# Patient Record
Sex: FEMALE | Race: BLACK OR AFRICAN AMERICAN | NOT HISPANIC OR LATINO | URBAN - METROPOLITAN AREA
[De-identification: names, ages, dates, MRNs, and addresses within clinical notes are randomized per-mention and may not be internally consistent; named-entity substitution may affect disease eponyms.]

---

## 2017-02-19 ENCOUNTER — INPATIENT (INPATIENT)
Facility: HOSPITAL | Age: 77
LOS: 2 days | Discharge: HOME | End: 2017-02-22
Admitting: INTERNAL MEDICINE
Payer: MEDICARE

## 2017-02-19 VITALS
RESPIRATION RATE: 16 BRPM | TEMPERATURE: 99 F | HEART RATE: 62 BPM | SYSTOLIC BLOOD PRESSURE: 133 MMHG | DIASTOLIC BLOOD PRESSURE: 106 MMHG | OXYGEN SATURATION: 98 %

## 2017-02-19 DIAGNOSIS — I15.9 SECONDARY HYPERTENSION, UNSPECIFIED: ICD-10-CM

## 2017-02-19 DIAGNOSIS — I16.9 HYPERTENSIVE CRISIS, UNSPECIFIED: ICD-10-CM

## 2017-02-19 DIAGNOSIS — J18.9 PNEUMONIA, UNSPECIFIED ORGANISM: ICD-10-CM

## 2017-02-19 DIAGNOSIS — Z98.89 OTHER SPECIFIED POSTPROCEDURAL STATES: Chronic | ICD-10-CM

## 2017-02-19 LAB
ALBUMIN SERPL ELPH-MCNC: 3.5 G/DL — SIGNIFICANT CHANGE UP (ref 3.3–5)
ALP SERPL-CCNC: 99 U/L — SIGNIFICANT CHANGE UP (ref 40–120)
ALT FLD-CCNC: 17 U/L — SIGNIFICANT CHANGE UP (ref 12–78)
ANION GAP SERPL CALC-SCNC: 5 MMOL/L — SIGNIFICANT CHANGE UP (ref 5–17)
APPEARANCE UR: CLEAR — SIGNIFICANT CHANGE UP
APTT BLD: 29.8 SEC — SIGNIFICANT CHANGE UP (ref 27.5–37.4)
AST SERPL-CCNC: 28 U/L — SIGNIFICANT CHANGE UP (ref 15–37)
BACTERIA # UR AUTO: (no result)
BASOPHILS # BLD AUTO: 0.1 K/UL — SIGNIFICANT CHANGE UP (ref 0–0.2)
BASOPHILS NFR BLD AUTO: 1.2 % — SIGNIFICANT CHANGE UP (ref 0–2)
BILIRUB SERPL-MCNC: 0.6 MG/DL — SIGNIFICANT CHANGE UP (ref 0.2–1.2)
BILIRUB UR-MCNC: NEGATIVE — SIGNIFICANT CHANGE UP
BUN SERPL-MCNC: 12 MG/DL — SIGNIFICANT CHANGE UP (ref 7–23)
CALCIUM SERPL-MCNC: 8.7 MG/DL — SIGNIFICANT CHANGE UP (ref 8.5–10.1)
CHLORIDE SERPL-SCNC: 108 MMOL/L — SIGNIFICANT CHANGE UP (ref 96–108)
CO2 SERPL-SCNC: 29 MMOL/L — SIGNIFICANT CHANGE UP (ref 22–31)
COLOR SPEC: YELLOW — SIGNIFICANT CHANGE UP
CREAT SERPL-MCNC: 1 MG/DL — SIGNIFICANT CHANGE UP (ref 0.5–1.3)
D DIMER BLD IA.RAPID-MCNC: 458 NG/ML DDU — HIGH
DIFF PNL FLD: (no result)
EOSINOPHIL # BLD AUTO: 0.1 K/UL — SIGNIFICANT CHANGE UP (ref 0–0.5)
EOSINOPHIL NFR BLD AUTO: 1.1 % — SIGNIFICANT CHANGE UP (ref 0–6)
EPI CELLS # UR: SIGNIFICANT CHANGE UP
GLUCOSE SERPL-MCNC: 94 MG/DL — SIGNIFICANT CHANGE UP (ref 70–99)
GLUCOSE UR QL: NEGATIVE MG/DL — SIGNIFICANT CHANGE UP
HCT VFR BLD CALC: 39.4 % — SIGNIFICANT CHANGE UP (ref 34.5–45)
HGB BLD-MCNC: 13.4 G/DL — SIGNIFICANT CHANGE UP (ref 11.5–15.5)
INR BLD: 1.02 RATIO — SIGNIFICANT CHANGE UP (ref 0.88–1.16)
KETONES UR-MCNC: NEGATIVE — SIGNIFICANT CHANGE UP
LACTATE SERPL-SCNC: 0.9 MMOL/L — SIGNIFICANT CHANGE UP (ref 0.7–2)
LEUKOCYTE ESTERASE UR-ACNC: NEGATIVE — SIGNIFICANT CHANGE UP
LYMPHOCYTES # BLD AUTO: 2 K/UL — SIGNIFICANT CHANGE UP (ref 1–3.3)
LYMPHOCYTES # BLD AUTO: 28.4 % — SIGNIFICANT CHANGE UP (ref 13–44)
MCHC RBC-ENTMCNC: 31.6 PG — SIGNIFICANT CHANGE UP (ref 27–34)
MCHC RBC-ENTMCNC: 34.1 GM/DL — SIGNIFICANT CHANGE UP (ref 32–36)
MCV RBC AUTO: 92.7 FL — SIGNIFICANT CHANGE UP (ref 80–100)
MONOCYTES # BLD AUTO: 0.5 K/UL — SIGNIFICANT CHANGE UP (ref 0–0.9)
MONOCYTES NFR BLD AUTO: 6.7 % — SIGNIFICANT CHANGE UP (ref 2–14)
NEUTROPHILS # BLD AUTO: 4.4 K/UL — SIGNIFICANT CHANGE UP (ref 1.8–7.4)
NEUTROPHILS NFR BLD AUTO: 62.5 % — SIGNIFICANT CHANGE UP (ref 43–77)
NITRITE UR-MCNC: NEGATIVE — SIGNIFICANT CHANGE UP
NT-PROBNP SERPL-SCNC: 338 PG/ML — SIGNIFICANT CHANGE UP (ref 0–450)
NT-PROBNP SERPL-SCNC: 360 PG/ML — SIGNIFICANT CHANGE UP (ref 0–450)
PH UR: 7 — SIGNIFICANT CHANGE UP (ref 4.8–8)
PLATELET # BLD AUTO: 207 K/UL — SIGNIFICANT CHANGE UP (ref 150–400)
POTASSIUM SERPL-MCNC: 5.4 MMOL/L — HIGH (ref 3.5–5.3)
POTASSIUM SERPL-SCNC: 5.4 MMOL/L — HIGH (ref 3.5–5.3)
PROT SERPL-MCNC: 7.7 GM/DL — SIGNIFICANT CHANGE UP (ref 6–8.3)
PROT UR-MCNC: 30 MG/DL
PROTHROM AB SERPL-ACNC: 11.2 SEC — SIGNIFICANT CHANGE UP (ref 10–13.1)
RAPID RVP RESULT: SIGNIFICANT CHANGE UP
RBC # BLD: 4.25 M/UL — SIGNIFICANT CHANGE UP (ref 3.8–5.2)
RBC # FLD: 12.2 % — SIGNIFICANT CHANGE UP (ref 10.3–14.5)
RBC CASTS # UR COMP ASSIST: SIGNIFICANT CHANGE UP /HPF (ref 0–4)
SODIUM SERPL-SCNC: 142 MMOL/L — SIGNIFICANT CHANGE UP (ref 135–145)
SP GR SPEC: 1 — LOW (ref 1.01–1.02)
TROPONIN I SERPL-MCNC: 0.01 NG/ML — SIGNIFICANT CHANGE UP (ref 0.01–0.04)
TROPONIN I SERPL-MCNC: <0.015 NG/ML — SIGNIFICANT CHANGE UP (ref 0.01–0.04)
TROPONIN I SERPL-MCNC: <0.015 NG/ML — SIGNIFICANT CHANGE UP (ref 0.01–0.04)
UROBILINOGEN FLD QL: NEGATIVE MG/DL — SIGNIFICANT CHANGE UP
WBC # BLD: 7.1 K/UL — SIGNIFICANT CHANGE UP (ref 3.8–10.5)
WBC # FLD AUTO: 7.1 K/UL — SIGNIFICANT CHANGE UP (ref 3.8–10.5)
WBC UR QL: SIGNIFICANT CHANGE UP

## 2017-02-19 PROCEDURE — 71010: CPT | Mod: 26

## 2017-02-19 PROCEDURE — 93010 ELECTROCARDIOGRAM REPORT: CPT

## 2017-02-19 PROCEDURE — 99285 EMERGENCY DEPT VISIT HI MDM: CPT

## 2017-02-19 PROCEDURE — 71275 CT ANGIOGRAPHY CHEST: CPT | Mod: 26

## 2017-02-19 RX ORDER — FUROSEMIDE 40 MG
20 TABLET ORAL ONCE
Qty: 0 | Refills: 0 | Status: COMPLETED | OUTPATIENT
Start: 2017-02-19 | End: 2017-02-19

## 2017-02-19 RX ORDER — ACETAMINOPHEN 500 MG
650 TABLET ORAL EVERY 6 HOURS
Qty: 0 | Refills: 0 | Status: DISCONTINUED | OUTPATIENT
Start: 2017-02-19 | End: 2017-02-22

## 2017-02-19 RX ORDER — ASPIRIN/CALCIUM CARB/MAGNESIUM 324 MG
325 TABLET ORAL ONCE
Qty: 0 | Refills: 0 | Status: COMPLETED | OUTPATIENT
Start: 2017-02-19 | End: 2017-02-19

## 2017-02-19 RX ORDER — NITROGLYCERIN 6.5 MG
1 CAPSULE, EXTENDED RELEASE ORAL ONCE
Qty: 0 | Refills: 0 | Status: COMPLETED | OUTPATIENT
Start: 2017-02-19 | End: 2017-02-19

## 2017-02-19 RX ORDER — ONDANSETRON 8 MG/1
4 TABLET, FILM COATED ORAL EVERY 6 HOURS
Qty: 0 | Refills: 0 | Status: DISCONTINUED | OUTPATIENT
Start: 2017-02-19 | End: 2017-02-22

## 2017-02-19 RX ORDER — SODIUM CHLORIDE 9 MG/ML
3 INJECTION INTRAMUSCULAR; INTRAVENOUS; SUBCUTANEOUS ONCE
Qty: 0 | Refills: 0 | Status: COMPLETED | OUTPATIENT
Start: 2017-02-19 | End: 2017-02-19

## 2017-02-19 RX ORDER — ENOXAPARIN SODIUM 100 MG/ML
40 INJECTION SUBCUTANEOUS EVERY 24 HOURS
Qty: 0 | Refills: 0 | Status: DISCONTINUED | OUTPATIENT
Start: 2017-02-19 | End: 2017-02-22

## 2017-02-19 RX ADMIN — Medication 325 MILLIGRAM(S): at 16:34

## 2017-02-19 RX ADMIN — Medication 20 MILLIGRAM(S): at 16:34

## 2017-02-19 RX ADMIN — SODIUM CHLORIDE 3 MILLILITER(S): 9 INJECTION INTRAMUSCULAR; INTRAVENOUS; SUBCUTANEOUS at 16:35

## 2017-02-19 RX ADMIN — Medication 1.25 MILLIGRAM(S): at 23:52

## 2017-02-19 RX ADMIN — Medication 1.25 MILLIGRAM(S): at 21:40

## 2017-02-19 RX ADMIN — Medication 0.1 MILLIGRAM(S): at 21:40

## 2017-02-19 RX ADMIN — Medication 1.25 MILLIGRAM(S): at 19:15

## 2017-02-19 RX ADMIN — Medication 1 INCH(S): at 16:35

## 2017-02-19 RX ADMIN — ENOXAPARIN SODIUM 40 MILLIGRAM(S): 100 INJECTION SUBCUTANEOUS at 23:52

## 2017-02-19 NOTE — H&P ADULT - PROBLEM SELECTOR PLAN 1
Admit to medical surgery unit  No evidence for sepsis  Blood cultures x 2 already collected in ED  Send urine legionella antigen and sputum GS/culture   Continue with levaquin  Follow clinically Admit to medical surgery unit  No evidence for sepsis  RVP negative  Blood cultures x 2 already collected in ED  Send urine legionella antigen and sputum GS/culture   Continue with levaquin  Follow clinically

## 2017-02-19 NOTE — ED PROVIDER NOTE - MEDICAL DECISION MAKING DETAILS
sob new, with cough and htn- r/o pe, possible infectious vs chf vs cad. labs, cxr, meds for htn . observe

## 2017-02-19 NOTE — ED PROVIDER NOTE - OBJECTIVE STATEMENT
77 y/o F with hx of HTN presents to ED for evaluation of shortness of breath, coughing and wheezing which began earlier today. Reports mild chest heaviness. Denies fevers or chills. No tobacco or alcohol use. No past surgical history. PMD- Co.

## 2017-02-19 NOTE — H&P ADULT - HISTORY OF PRESENT ILLNESS
76 year old female with a PMHx notable for HTN, who presents to ED for evaluation of shortness , cough, and wheezing.  Her symptoms included cough with wheezing which began earlier today.  Reports mild chest heavinees.  Otherwise denies or fevers or chills.  No alcohol or tobacco use.   No past surgical history.     ED course: CT chest angio with no evidence for PE   Scattered groundglass opacities in the bilateral LL which may reflect infiltrates or infectious or inflammatory etiology.    PT was given levaquin IV.      I 76 year old female with a PMHx notable for HTN, who presents to ED for evaluation of shortness , cough, and wheezing.  Her symptoms included cough with wheezing which began earlier today.  Reports mild chest heavinees.  Otherwise denies or fevers or chills.  No alcohol or tobacco use.   No past surgical history.     ED course: BP = 220/100 clonidine 0.1mg x 2 given followed by enalaprilat 1.25mg  --> BP improved to 144/63  CT chest angio with no evidence for PE   Scattered groundglass opacities in the bilateral LL which may reflect infiltrates or infectious or inflammatory etiology.  Patient was given levaquin IV.

## 2017-02-19 NOTE — H&P ADULT - NSHPPHYSICALEXAM_GEN_ALL_CORE
Physical Exam:   GENERAL APPEARANCE:  NAD, hemodynamically stable  T(C): 37, Max: 37 (02-19 @ 13:04)  HR: 54 (54 - 62)  BP: 222/97 (133/106 - 222/97)  RR: 16 (16 - 16)  SpO2: 98% (98% - 98%)  Wt(kg): --  HEENT:  Head is normocephalic    Skin:  Warm and dry without any rash   NECK:  Supple without lymphadenopathy.   HEART:  Regular rate and rhythm. normal S1 and S2, No M/R/G  LUNGS:  Good ins/exp effort, no W/R/R/C  ABDOMEN:  Soft, nontender, nondistended with good bowel sounds heard  EXTREMITIES:  Without cyanosis, clubbing or edema.   NEUROLOGICAL:  Gross nonfocal

## 2017-02-19 NOTE — H&P ADULT - NSHPLABSRESULTS_GEN_ALL_CORE
LABS:                        13.4   7.1   )-----------( 207      ( 2017 14:26 )             39.4     2017 14:26    142    |  108    |  12     ----------------------------<  94     5.4     |  29     |  1.00     Ca    8.7        2017 14:26    TPro  7.7    /  Alb  3.5    /  TBili  0.6    /  DBili  x      /  AST  28     /  ALT  17     /  AlkPhos  99     2017 14:26    PT/INR - ( 2017 14:26 )   PT: 11.2 sec;   INR: 1.02 ratio         PTT - ( 2017 14:26 )  PTT:29.8 sec  Urinalysis Basic - ( 2017 14:26 )    Color: Yellow / Appearance: Clear / S.005 / pH: x  Gluc: x / Ketone: Negative  / Bili: Negative / Urobili: Negative mg/dL   Blood: x / Protein: 30 mg/dL / Nitrite: Negative   Leuk Esterase: Negative / RBC: 0-2 /HPF / WBC 0-2   Sq Epi: x / Non Sq Epi: Occasional / Bacteria: Occasional        CARDIAC MARKERS ( 2017 17:36 )  <0.015 ng/mL / x     / x     / x     / x      CARDIAC MARKERS ( 2017 14:52 )  0.015 ng/mL / x     / x     / x     / x      CARDIAC MARKERS ( 2017 14:26 )  <0.015 ng/mL / x     / x     / x     / x

## 2017-02-19 NOTE — ED PROVIDER NOTE - NS ED MD SCRIBE ATTENDING SCRIBE SECTIONS
PAST MEDICAL/SURGICAL/SOCIAL HISTORY/PHYSICAL EXAM/DISPOSITION/REVIEW OF SYSTEMS/HISTORY OF PRESENT ILLNESS

## 2017-02-19 NOTE — ED ADULT NURSE REASSESSMENT NOTE - NS ED NURSE REASSESS COMMENT FT1
Received pt at 19:30 - resting in bed, BP elevated - MD Arciniega aware. No complaints at this time - no apparent signs of distress, safety maintained, will continue to monitor.

## 2017-02-19 NOTE — ED PROVIDER NOTE - PROGRESS NOTE DETAILS
meds: clonidine 0.1, 3x a day, simvastatin 10 once a day, losartan 50 once a day, metoprolol bid unknown dose

## 2017-02-19 NOTE — H&P ADULT - ASSESSMENT
76 year old female with a PMHx notable for HTN, who presents to ED for evaluation of shortness , cough, and wheezing.  Her symptoms included cough with wheezing which began earlier today.  Reports mild chest heaviness.  Otherwise denies or fevers or chills.  No alcohol or tobacco use.   No past surgical history.     ED course:  BP = 220/100 clonidine 0.1mg x 2 given followed by enalaprilat 1.25mg  --> BP improved to 144/63  CT chest angio with no evidence for PE   Scattered groundglass opacities in the bilateral LL which may reflect infiltrates or infectious or inflammatory etiology.    Patient was given levaquin IV.

## 2017-02-20 DIAGNOSIS — E78.5 HYPERLIPIDEMIA, UNSPECIFIED: ICD-10-CM

## 2017-02-20 DIAGNOSIS — I10 ESSENTIAL (PRIMARY) HYPERTENSION: ICD-10-CM

## 2017-02-20 LAB
CULTURE RESULTS: SIGNIFICANT CHANGE UP
SPECIMEN SOURCE: SIGNIFICANT CHANGE UP

## 2017-02-20 RX ORDER — CEFTRIAXONE 500 MG/1
1 INJECTION, POWDER, FOR SOLUTION INTRAMUSCULAR; INTRAVENOUS EVERY 24 HOURS
Qty: 0 | Refills: 0 | Status: DISCONTINUED | OUTPATIENT
Start: 2017-02-21 | End: 2017-02-22

## 2017-02-20 RX ORDER — AZITHROMYCIN 500 MG/1
500 TABLET, FILM COATED ORAL DAILY
Qty: 0 | Refills: 0 | Status: COMPLETED | OUTPATIENT
Start: 2017-02-20 | End: 2017-02-22

## 2017-02-20 RX ORDER — CEFTRIAXONE 500 MG/1
1 INJECTION, POWDER, FOR SOLUTION INTRAMUSCULAR; INTRAVENOUS ONCE
Qty: 0 | Refills: 0 | Status: COMPLETED | OUTPATIENT
Start: 2017-02-20 | End: 2017-02-20

## 2017-02-20 RX ORDER — CEFTRIAXONE 500 MG/1
INJECTION, POWDER, FOR SOLUTION INTRAMUSCULAR; INTRAVENOUS
Qty: 0 | Refills: 0 | Status: DISCONTINUED | OUTPATIENT
Start: 2017-02-20 | End: 2017-02-22

## 2017-02-20 RX ORDER — AMLODIPINE BESYLATE 2.5 MG/1
10 TABLET ORAL ONCE
Qty: 0 | Refills: 0 | Status: COMPLETED | OUTPATIENT
Start: 2017-02-20 | End: 2017-02-20

## 2017-02-20 RX ADMIN — AZITHROMYCIN 500 MILLIGRAM(S): 500 TABLET, FILM COATED ORAL at 12:39

## 2017-02-20 RX ADMIN — ENOXAPARIN SODIUM 40 MILLIGRAM(S): 100 INJECTION SUBCUTANEOUS at 23:13

## 2017-02-20 RX ADMIN — Medication 1.25 MILLIGRAM(S): at 05:46

## 2017-02-20 RX ADMIN — Medication 1.25 MILLIGRAM(S): at 23:13

## 2017-02-20 RX ADMIN — CEFTRIAXONE 100 GRAM(S): 500 INJECTION, POWDER, FOR SOLUTION INTRAMUSCULAR; INTRAVENOUS at 12:24

## 2017-02-20 RX ADMIN — Medication 1 INCH(S): at 05:49

## 2017-02-20 RX ADMIN — Medication 1.25 MILLIGRAM(S): at 16:30

## 2017-02-20 RX ADMIN — Medication 650 MILLIGRAM(S): at 05:50

## 2017-02-20 RX ADMIN — Medication 1.25 MILLIGRAM(S): at 11:18

## 2017-02-20 NOTE — PATIENT PROFILE ADULT. - PMH
Arthritis    Cataract  bilateral  DJD (degenerative joint disease)    HTN (hypertension)    Legally blind

## 2017-02-20 NOTE — CONSULT NOTE ADULT - SUBJECTIVE AND OBJECTIVE BOX
Patient is a 76y old  Female who presents with a chief complaint of Shortness of breath, cough, wheezing (2017 02:56)      HPI:  76 year old female with a PMHx notable for HTN, arthritis, admitted  with shortness of breath, cough, and wheezing which began day of admission, no recent travel, no sick contacts, no tobacco use, no nasal congestion or sore throat, here afebrile, no wbc ct, BP was noted to be high around 220/100, CTA was performed revealing no PE but scattered ground glass opacities in b/l lower lobes concerning for PNA, was given IV levaquin for PNA coverage.    Last hospitalization was several years ago.      PMH: as above    PSH: as above    Meds: per reconciliation sheet, noted below    MEDICATIONS  (STANDING):  enalaprilat Injectable 1.25milliGRAM(s) IV Push every 6 hours  enoxaparin Injectable 40milliGRAM(s) SubCutaneous every 24 hours  cefTRIAXone   IVPB  IV Intermittent   azithromycin   Tablet 500milliGRAM(s) Oral daily  cefTRIAXone   IVPB 1Gram(s) IV Intermittent once      Allergies    Aggrenox (Unknown)    Intolerances        Social: no smoking, no alcohol, no illegal drugs; no recent travel, no exposure to TB    Family history:  No pertinent family history in first degree relatives      ROS: the patient denies fever, no chills, no HA, no dizziness, no sore throat, no blurry vision, no CP, no palpitations, no abdominal pain, no diarrhea, no N/V, no dysuria, no leg pain, no claudication, no rash, no joint aches, no rectal pain or bleeding, no night sweats    Vital Signs Last 24 Hrs  T(C): 36.8, Max: 37 (- @ 13:04)  T(F): 98.2, Max: 98.6 ( @ 13:04)  HR: 51 (45 - 62)  BP: 150/56 (133/106 - 222/97)  BP(mean): --  RR: 18 (16 - 21)  SpO2: 100% (96% - 100%)      PE:  Constitutional: laying in bed, NAD  HEENT: NC/AT, EOMI, PERRLA  Neck: supple  Back: no tenderness  Respiratory: decreased breath sounds, no rhonchi  Cardiovascular: S1S2 regular, no murmurs  Abdomen: soft, not tender, not distended, positive BS  Genitourinary: deferred  Rectal: deferred  Musculoskeletal: no muscle tenderness, no joint swelling or tenderness  Extremities: no pedal edema  Neurological: AxOx3, moving all extremities, no focal deficits  Skin: no rashes    Labs:                        13.4   7.1   )-----------( 207      ( 2017 14:26 )             39.4     2017 14:26    142    |  108    |  12     ----------------------------<  94     5.4     |  29     |  1.00     Ca    8.7        2017 14:26    TPro  7.7    /  Alb  3.5    /  TBili  0.6    /  DBili  x      /  AST  28     /  ALT  17     /  AlkPhos  99     2017 14:26     LIVER FUNCTIONS - ( 2017 14:26 )  Alb: 3.5 g/dL / Pro: 7.7 gm/dL / ALK PHOS: 99 U/L / ALT: 17 U/L / AST: 28 U/L / GGT: x           Urinalysis Basic - ( 2017 14:26 )    Color: Yellow / Appearance: Clear / S.005 / pH: x  Gluc: x / Ketone: Negative  / Bili: Negative / Urobili: Negative mg/dL   Blood: x / Protein: 30 mg/dL / Nitrite: Negative   Leuk Esterase: Negative / RBC: 0-2 /HPF / WBC 0-2   Sq Epi: x / Non Sq Epi: Occasional / Bacteria: Occasional            Radiology:  EXAM:  CT ANGIO CHEST PE PROTOCOL IC                            PROCEDURE DATE:  2017        INTERPRETATION:      CT Angiography of the chest for the evaluation of pulmonary emboli            CLINICAL INFORMATION:  Cough Pulmonary emboli, short of breath.    TECHNIQUE: Contiguous axial .5 mm sections were obtained through the   chest using single helical acquisition acquired during the rapid bolus   administration of 95 cc of Omnipaque 350/ 5 cc discarded.   This data set   was reconstructed as 3 mm and 1 mm axial sections through the chest and   as 3 mm sagittal  and coronal sections.  Post processing angiographic   reconstruction of images was performed.   This scan was performed using   automatic exposure control (radiation dose reduction software) to obtain   a diagnostic image quality scan with patient dose as low as reasonably   achievable.        FINDINGS:   No previous examinations are available for review.    No convincing pulmonary emboli are identified.  This evaluationincludes   the main pulmonary artery, its right and left branches, and their   segmental branches. Subsegmental branches are incompletely evaluated but   where seen appear patent.    The thyroid gland appears intact.    The lungs are significant for scattered faint groundglass opacities in   the BILATERAL lower lobes which may reflect infiltrates of infectious or   inflammatory etiology.  The trachea and major bronchi appear patent.    No enlarged axillary, hilar or mediastinal lymph nodes are found.   The   heart size is normal.   The mediastinum, chest wall and thoracic spine   appear otherwise unremarkable.    Limited evaluation of the upper abdomen is unremarkable.      IMPRESSION:        1.   No evidence of pulmonary emboli        2.   scattered faint groundglass opacities in the BILATERAL lower   lobes which may reflect infiltrates of infectious or inflammatory   etiology        Advanced directives addressed: full resuscitation

## 2017-02-20 NOTE — PROGRESS NOTE ADULT - PROBLEM SELECTOR PLAN 1
No evidence for sepsis  RVP negative  Appreciate ID eval  Optimize abx --> to ceftriaxone/azithromycin  Follow-up urine legionella antigen and sputum GS/culture   Follow clinically

## 2017-02-20 NOTE — CONSULT NOTE ADULT - ASSESSMENT
76 year old female with a PMHx notable for HTN, arthritis, admitted 2/19 with shortness of breath, cough, and wheezing which began day of admission, no recent travel, no sick contacts, no tobacco use, no nasal congestion or sore throat, here afebrile, no wbc ct, BP was noted to be high around 220/100, CTA was performed revealing no PE but scattered ground glass opacities in b/l lower lobes concerning for PNA, was given IV levaquin for PNA coverage.    1. CAP/b/l lower lobe pneumonia/malignant hypertension    - given 1 dose of levaquin    - hold further levaquin and start CAP coverage with IV rocephin [id number] plus azithromycin 500mg daily    - if improves in next 24-48 hours, switch to oral ceftin 500mg q12h for total 7 days and aztihromycin 500mg X 3 days    - f/u CBC    - monitor temps    - -tolerating abx well so far; no side effects noted    -reason for abx use and side effects reviewed with patient    - supportive care    - BP improved

## 2017-02-21 LAB
ANION GAP SERPL CALC-SCNC: 10 MMOL/L — SIGNIFICANT CHANGE UP (ref 5–17)
BUN SERPL-MCNC: 11 MG/DL — SIGNIFICANT CHANGE UP (ref 7–23)
CALCIUM SERPL-MCNC: 8.8 MG/DL — SIGNIFICANT CHANGE UP (ref 8.5–10.1)
CHLORIDE SERPL-SCNC: 109 MMOL/L — HIGH (ref 96–108)
CO2 SERPL-SCNC: 26 MMOL/L — SIGNIFICANT CHANGE UP (ref 22–31)
CREAT SERPL-MCNC: 0.76 MG/DL — SIGNIFICANT CHANGE UP (ref 0.5–1.3)
GLUCOSE SERPL-MCNC: 100 MG/DL — HIGH (ref 70–99)
HCT VFR BLD CALC: 38.6 % — SIGNIFICANT CHANGE UP (ref 34.5–45)
HGB BLD-MCNC: 13.1 G/DL — SIGNIFICANT CHANGE UP (ref 11.5–15.5)
LEGIONELLA AG UR QL: NEGATIVE — SIGNIFICANT CHANGE UP
MCHC RBC-ENTMCNC: 31.5 PG — SIGNIFICANT CHANGE UP (ref 27–34)
MCHC RBC-ENTMCNC: 34 GM/DL — SIGNIFICANT CHANGE UP (ref 32–36)
MCV RBC AUTO: 92.7 FL — SIGNIFICANT CHANGE UP (ref 80–100)
PLATELET # BLD AUTO: 186 K/UL — SIGNIFICANT CHANGE UP (ref 150–400)
POTASSIUM SERPL-MCNC: 3.7 MMOL/L — SIGNIFICANT CHANGE UP (ref 3.5–5.3)
POTASSIUM SERPL-SCNC: 3.7 MMOL/L — SIGNIFICANT CHANGE UP (ref 3.5–5.3)
RBC # BLD: 4.16 M/UL — SIGNIFICANT CHANGE UP (ref 3.8–5.2)
RBC # FLD: 12.4 % — SIGNIFICANT CHANGE UP (ref 10.3–14.5)
SODIUM SERPL-SCNC: 145 MMOL/L — SIGNIFICANT CHANGE UP (ref 135–145)
WBC # BLD: 7 K/UL — SIGNIFICANT CHANGE UP (ref 3.8–10.5)
WBC # FLD AUTO: 7 K/UL — SIGNIFICANT CHANGE UP (ref 3.8–10.5)

## 2017-02-21 RX ORDER — METOPROLOL TARTRATE 50 MG
25 TABLET ORAL
Qty: 0 | Refills: 0 | Status: DISCONTINUED | OUTPATIENT
Start: 2017-02-21 | End: 2017-02-21

## 2017-02-21 RX ORDER — PANTOPRAZOLE SODIUM 20 MG/1
40 TABLET, DELAYED RELEASE ORAL
Qty: 0 | Refills: 0 | Status: DISCONTINUED | OUTPATIENT
Start: 2017-02-21 | End: 2017-02-22

## 2017-02-21 RX ORDER — LOSARTAN POTASSIUM 100 MG/1
25 TABLET, FILM COATED ORAL DAILY
Qty: 0 | Refills: 0 | Status: DISCONTINUED | OUTPATIENT
Start: 2017-02-21 | End: 2017-02-21

## 2017-02-21 RX ORDER — LOSARTAN POTASSIUM 100 MG/1
25 TABLET, FILM COATED ORAL DAILY
Qty: 0 | Refills: 0 | Status: DISCONTINUED | OUTPATIENT
Start: 2017-02-22 | End: 2017-02-22

## 2017-02-21 RX ORDER — CEFOXITIN 1 G/1
1 INJECTION, POWDER, FOR SOLUTION INTRAVENOUS
Qty: 10 | Refills: 0 | OUTPATIENT
Start: 2017-02-21 | End: 2017-02-26

## 2017-02-21 RX ORDER — METOPROLOL TARTRATE 50 MG
50 TABLET ORAL
Qty: 0 | Refills: 0 | Status: DISCONTINUED | OUTPATIENT
Start: 2017-02-22 | End: 2017-02-22

## 2017-02-21 RX ORDER — METOPROLOL TARTRATE 50 MG
25 TABLET ORAL ONCE
Qty: 0 | Refills: 0 | Status: COMPLETED | OUTPATIENT
Start: 2017-02-21 | End: 2017-02-21

## 2017-02-21 RX ORDER — ATORVASTATIN CALCIUM 80 MG/1
10 TABLET, FILM COATED ORAL AT BEDTIME
Qty: 0 | Refills: 0 | Status: DISCONTINUED | OUTPATIENT
Start: 2017-02-21 | End: 2017-02-22

## 2017-02-21 RX ADMIN — Medication 1.25 MILLIGRAM(S): at 17:07

## 2017-02-21 RX ADMIN — Medication 1.25 MILLIGRAM(S): at 10:43

## 2017-02-21 RX ADMIN — Medication 0.1 MILLIGRAM(S): at 16:12

## 2017-02-21 RX ADMIN — Medication 30 MILLILITER(S): at 03:23

## 2017-02-21 RX ADMIN — LOSARTAN POTASSIUM 25 MILLIGRAM(S): 100 TABLET, FILM COATED ORAL at 12:28

## 2017-02-21 RX ADMIN — CEFTRIAXONE 100 GRAM(S): 500 INJECTION, POWDER, FOR SOLUTION INTRAMUSCULAR; INTRAVENOUS at 10:43

## 2017-02-21 RX ADMIN — Medication 25 MILLIGRAM(S): at 16:59

## 2017-02-21 RX ADMIN — PANTOPRAZOLE SODIUM 40 MILLIGRAM(S): 20 TABLET, DELAYED RELEASE ORAL at 10:44

## 2017-02-21 RX ADMIN — AMLODIPINE BESYLATE 10 MILLIGRAM(S): 2.5 TABLET ORAL at 00:31

## 2017-02-21 RX ADMIN — Medication 1.25 MILLIGRAM(S): at 05:02

## 2017-02-21 RX ADMIN — ENOXAPARIN SODIUM 40 MILLIGRAM(S): 100 INJECTION SUBCUTANEOUS at 23:22

## 2017-02-21 RX ADMIN — Medication 25 MILLIGRAM(S): at 11:37

## 2017-02-21 RX ADMIN — ATORVASTATIN CALCIUM 10 MILLIGRAM(S): 80 TABLET, FILM COATED ORAL at 22:21

## 2017-02-21 RX ADMIN — Medication 0.1 MILLIGRAM(S): at 22:21

## 2017-02-21 RX ADMIN — AZITHROMYCIN 500 MILLIGRAM(S): 500 TABLET, FILM COATED ORAL at 12:28

## 2017-02-21 NOTE — DISCHARGE NOTE ADULT - CARE PROVIDER_API CALL
Co, August NESS), Internal Medicine; Pulmonary Disease  [address] [place] [address]  Phone: [phone]  Fax: [phone]

## 2017-02-21 NOTE — PROGRESS NOTE ADULT - ASSESSMENT
76 year old female with a PMHx notable for HTN, who presents to ED for evaluation of shortness , cough, and wheezing.  Her symptoms included cough with wheezing which began earlier today.  Reports mild chest heaviness.  Otherwise denies or fevers or chills.  No alcohol or tobacco use.   No past surgical history.     ED course:  BP = 220/100 clonidine 0.1mg x 2 given followed by enalaprilat 1.25mg  --> BP improved to 144/63  CT chest angio with no evidence for PE   Scattered groundglass opacities in the bilateral LL which may reflect infiltrates or infectious or inflammatory etiology.    Patient was given levaquin IV.
76 year old female with a PMHx notable for HTN, arthritis, admitted 2/19 with shortness of breath, cough, and wheezing which began day of admission, no recent travel, no sick contacts, no tobacco use, no nasal congestion or sore throat, here afebrile, no wbc ct, BP was noted to be high around 220/100, CTA was performed revealing no PE but scattered ground glass opacities in b/l lower lobes concerning for PNA, was given IV levaquin for PNA coverage.    1. CAP/b/l lower lobe pneumonia/malignant hypertension    - improving    - given 1 dose of levaquin    - continue with CAP coverage with IV rocephin [id number] plus azithromycin 500mg daily    -switch to oral ceftin 500mg q12h for total 7 days and aztihromycin 500mg X 3 days total    - f/u CBC    - monitor temps    - -tolerating abx well so far; no side effects noted    -reason for abx use and side effects reviewed with patient    - supportive care    - BP improved
76 year old female with a PMHx notable for HTN, who presents to ED for evaluation of shortness , cough, and wheezing.  Her symptoms included cough with wheezing which began earlier today.  Reports mild chest heaviness.  Otherwise denies or fevers or chills.  No alcohol or tobacco use.   No past surgical history.     ED course:  BP = 220/100 clonidine 0.1mg x 2 given followed by enalaprilat 1.25mg  --> BP improved to 144/63  CT chest angio with no evidence for PE   Scattered groundglass opacities in the bilateral LL which may reflect infiltrates or infectious or inflammatory etiology.    Patient was given levaquin IV.

## 2017-02-21 NOTE — DISCHARGE NOTE ADULT - PLAN OF CARE
abx f/u pcp in 1-2 weeks med management as above. please follow up closely with your pcp for ongoing care and very close blood pressure control.

## 2017-02-21 NOTE — DISCHARGE NOTE ADULT - MEDICATION SUMMARY - MEDICATIONS TO CHANGE
I will SWITCH the dose or number of times a day I take the medications listed below when I get home from the hospital:  None I will SWITCH the dose or number of times a day I take the medications listed below when I get home from the hospital:    metoprolol tartrate 25 mg oral tablet  -- 1 tab(s) by mouth 2 times a day

## 2017-02-21 NOTE — PROGRESS NOTE ADULT - PROBLEM SELECTOR PLAN 1
No evidence for sepsis  RVP negative  D/W dR newberry, R/Z day #3-> can switch to ceftin 500 BID for 5 more days upon d/c, presumably today if BP controlled

## 2017-02-21 NOTE — DISCHARGE NOTE ADULT - MEDICATION SUMMARY - MEDICATIONS TO STOP TAKING
I will STOP taking the medications listed below when I get home from the hospital:    BP med, pt unsure of name  --

## 2017-02-21 NOTE — PROGRESS NOTE ADULT - PROBLEM SELECTOR PLAN 2
Need to clarify BP med taken at home  Start norvasc while in the hospital
start lopressor 25bid  losartan 25 qd  recheck BP this afternoon, if uncontrolled add clonindine .1 po TID and monitor for 24 hours

## 2017-02-21 NOTE — PHYSICAL THERAPY INITIAL EVALUATION ADULT - LIVES WITH, PROFILE
resides [facility]; rosalie Horton resides  [address]st floor [address] [place]; dtr Sol lives nearby and stops in frequently/alone

## 2017-02-21 NOTE — CHART NOTE - NSCHARTNOTEFT_GEN_A_CORE
d/c postponed, BP elevated. Will need further w/u to include pheo rule out. Ordered Urine VMA and urine metanephrines.   Plan:  Increase BB  Add HCTZ  Continue clonidine/ARB

## 2017-02-21 NOTE — DISCHARGE NOTE ADULT - PATIENT PORTAL LINK FT
“You can access the [facility] Patient Portal, offered by [facility], by registering with the following website: [email or web address]”

## 2017-02-21 NOTE — DISCHARGE NOTE ADULT - MEDICATION SUMMARY - MEDICATIONS TO TAKE
I will START or STAY ON the medications listed below when I get home from the hospital:    losartan 25 mg oral tablet  -- 1 tab(s) by mouth once a day  -- Indication: For Htn    cloNIDine 0.1 mg oral tablet  -- 1 milligram(s) by mouth 3 times a day  -- Indication: For Htn    simvastatin 10 mg oral tablet  -- 1 tab(s) by mouth once a day (at bedtime)  -- Indication: For dld    metoprolol tartrate 25 mg oral tablet  -- 1 tab(s) by mouth 2 times a day  -- Indication: For Htn    Ceftin 500 mg oral tablet  -- 1 tab(s) by mouth 2 times a day  -- Finish all this medication unless otherwise directed by prescriber.  Medication should be taken with plenty of water.  Take with food or milk.    -- Indication: For Abx I will START or STAY ON the medications listed below when I get home from the hospital:    losartan 25 mg oral tablet  -- 1 tab(s) by mouth once a day  -- Indication: For Htn    cloNIDine 0.1 mg oral tablet  -- 1 milligram(s) by mouth 3 times a day  -- Indication: For Htn    simvastatin 10 mg oral tablet  -- 1 tab(s) by mouth once a day (at bedtime)  -- Indication: For dld    metoprolol tartrate 50 mg oral tablet  -- 1 tab(s) by mouth 2 times a day  -- Indication: For HYPERTENSION    Ceftin 500 mg oral tablet  -- 1 tab(s) by mouth 2 times a day  -- Finish all this medication unless otherwise directed by prescriber.  Medication should be taken with plenty of water.  Take with food or milk.    -- Indication: For Abx

## 2017-02-21 NOTE — DISCHARGE NOTE ADULT - CARE PLAN
Principal Discharge DX:	CAP (community acquired pneumonia)  Goal:	abx  Instructions for follow-up, activity and diet:	f/u pcp in 1-2 weeks  Secondary Diagnosis:	Essential hypertension Principal Discharge DX:	CAP (community acquired pneumonia)  Goal:	abx  Instructions for follow-up, activity and diet:	f/u pcp in 1-2 weeks  Secondary Diagnosis:	Essential hypertension  Goal:	med management as above.  Instructions for follow-up, activity and diet:	please follow up closely with your pcp for ongoing care and very close blood pressure control.

## 2017-02-21 NOTE — DISCHARGE NOTE ADULT - HOSPITAL COURSE
pt admitted for CAP and malignant HTN, s/p 3 days Rocephin and zithro to complate full oral ceftin course for 5 more days then f/u with PCP. Low Na diet. pt admitted for CAP and malignant HTN, s/p 3 days Rocephin and zithro to complate full oral ceftin course for 5 more days then f/u with PCP. Low Na diet.  Pt needs very close follow up for her blood pressure control as outpatient.

## 2017-02-22 VITALS
DIASTOLIC BLOOD PRESSURE: 73 MMHG | SYSTOLIC BLOOD PRESSURE: 144 MMHG | HEART RATE: 60 BPM | RESPIRATION RATE: 17 BRPM | TEMPERATURE: 98 F

## 2017-02-22 RX ORDER — METOPROLOL TARTRATE 50 MG
1 TABLET ORAL
Qty: 0 | Refills: 0 | DISCHARGE
Start: 2017-02-22

## 2017-02-22 RX ORDER — METOPROLOL TARTRATE 50 MG
1 TABLET ORAL
Qty: 0 | Refills: 0 | COMMUNITY

## 2017-02-22 RX ADMIN — Medication 0.1 MILLIGRAM(S): at 14:20

## 2017-02-22 RX ADMIN — PANTOPRAZOLE SODIUM 40 MILLIGRAM(S): 20 TABLET, DELAYED RELEASE ORAL at 08:33

## 2017-02-22 RX ADMIN — Medication 50 MILLIGRAM(S): at 05:04

## 2017-02-22 RX ADMIN — CEFTRIAXONE 100 GRAM(S): 500 INJECTION, POWDER, FOR SOLUTION INTRAMUSCULAR; INTRAVENOUS at 11:33

## 2017-02-22 RX ADMIN — LOSARTAN POTASSIUM 25 MILLIGRAM(S): 100 TABLET, FILM COATED ORAL at 05:04

## 2017-02-22 RX ADMIN — AZITHROMYCIN 500 MILLIGRAM(S): 500 TABLET, FILM COATED ORAL at 11:34

## 2017-02-22 RX ADMIN — Medication 0.1 MILLIGRAM(S): at 05:06

## 2017-02-22 NOTE — PROGRESS NOTE ADULT - SUBJECTIVE AND OBJECTIVE BOX
76 year old female with a PMHx notable for HTN, arthritis, admitted 2/19 with shortness of breath, cough, and wheezing which began day of admission, no recent travel, no sick contacts, no tobacco use, no nasal congestion or sore throat, here afebrile, no wbc ct, BP was noted to be high around 220/100, CTA was performed revealing no PE but scattered ground glass opacities in b/l lower lobes concerning for PNA, was given IV levaquin for PNA coverage.    feels better  has some reflux  no fevers    MEDICATIONS  (STANDING):  enalaprilat Injectable 1.25milliGRAM(s) IV Push every 6 hours  enoxaparin Injectable 40milliGRAM(s) SubCutaneous every 24 hours  cefTRIAXone   IVPB  IV Intermittent   azithromycin   Tablet 500milliGRAM(s) Oral daily  cefTRIAXone   IVPB 1Gram(s) IV Intermittent every 24 hours  metoprolol 25milliGRAM(s) Oral two times a day  losartan 25milliGRAM(s) Oral daily  atorvastatin 10milliGRAM(s) Oral at bedtime  pantoprazole    Tablet 40milliGRAM(s) Oral before breakfast      Vital Signs Last 24 Hrs  T(C): 36.8, Max: 36.8 (02-21 @ 09:58)  T(F): 98.2, Max: 98.2 (02-21 @ 09:58)  HR: 79 (65 - 79)  BP: 157/62 (155/62 - 198/70)  BP(mean): --  RR: 16 (16 - 16)  SpO2: 99% (96% - 100%)        PE:  Constitutional: laying in bed, NAD  HEENT: NC/AT, EOMI, PERRLA  Neck: supple  Back: no tenderness  Respiratory: decreased breath sounds, no rhonchi  Cardiovascular: S1S2 regular, no murmurs  Abdomen: soft, not tender, not distended, positive BS  Genitourinary: deferred  Rectal: deferred  Musculoskeletal: no muscle tenderness, no joint swelling or tenderness  Extremities: no pedal edema  Neurological: AxOx3, moving all extremities, no focal deficits  Skin: no rashes    Labs:                        13.1   7.0   )-----------( 186      ( 21 Feb 2017 07:28 )             38.6     21 Feb 2017 07:28    145    |  109    |  11     ----------------------------<  100    3.7     |  26     |  0.76     Ca    8.8        21 Feb 2017 07:28    TPro  7.7    /  Alb  3.5    /  TBili  0.6    /  DBili  x      /  AST  28     /  ALT  17     /  AlkPhos  99     19 Feb 2017 14:26           Cultures: urine cx, blood cx no growth        Radiology:  EXAM:  CT ANGIO CHEST PE PROTOCOL IC                            PROCEDURE DATE:  02/19/2017        INTERPRETATION:      CT Angiography of the chest for the evaluation of pulmonary emboli            CLINICAL INFORMATION:  Cough Pulmonary emboli, short of breath.    TECHNIQUE: Contiguous axial .5 mm sections were obtained through the   chest using single helical acquisition acquired during the rapid bolus   administration of 95 cc of Omnipaque 350/ 5 cc discarded.   This data set   was reconstructed as 3 mm and 1 mm axial sections through the chest and   as 3 mm sagittal  and coronal sections.  Post processing angiographic   reconstruction of images was performed.   This scan was performed using   automatic exposure control (radiation dose reduction software) to obtain   a diagnostic image quality scan with patient dose as low as reasonably   achievable.        FINDINGS:   No previous examinations are available for review.    No convincing pulmonary emboli are identified.  This evaluationincludes   the main pulmonary artery, its right and left branches, and their   segmental branches. Subsegmental branches are incompletely evaluated but   where seen appear patent.    The thyroid gland appears intact.    The lungs are significant for scattered faint groundglass opacities in   the BILATERAL lower lobes which may reflect infiltrates of infectious or   inflammatory etiology.  The trachea and major bronchi appear patent.    No enlarged axillary, hilar or mediastinal lymph nodes are found.   The   heart size is normal.   The mediastinum, chest wall and thoracic spine   appear otherwise unremarkable.    Limited evaluation of the upper abdomen is unremarkable.      IMPRESSION:        1.   No evidence of pulmonary emboli        2.   scattered faint groundglass opacities in the BILATERAL lower   lobes which may reflect infiltrates of infectious or inflammatory   etiology        Advanced directives addressed: full resuscitation
76 year old female with a PMHx notable for HTN, who presents to ED for evaluation of shortness , cough, and wheezing.  Her symptoms included cough with wheezing which began earlier today.  Reports mild chest heavinees.  Otherwise denies or fevers or chills.  No alcohol or tobacco use.   No past surgical history.     ED course: BP = 220/100 clonidine 0.1mg x 2 given followed by enalaprilat 1.25mg  --> BP improved to 144/63  CT chest angio with no evidence for PE   Scattered groundglass opacities in the bilateral LL which may reflect infiltrates or infectious or inflammatory etiology.  Patient was given levaquin IV.      2/21: afebrile. BP remains uncontrolled. 155/61 after dose of vascotec IV.  Home BP meds have yet to be resumed. Denies cp/sob, dizziness or diplopia.     2/22: Pt had episode of hypertension yesterday with systolic 200.  Asymptomatic however.  Currently BP greatly improved but can have elevations/fluctations.  Spoke to her about this and she will need very close outpatient monitoring of her BP.  Goal is not too dramatically reduce her bp but to slowly bring it down over time.  She may need further work up for secondary causes as outpatient.    Vital Signs Last 24 Hrs  T(C): 36.6, Max: 36.6 (02-21 @ 22:00)  T(F): 97.9, Max: 97.9 (02-21 @ 22:00)  HR: 63 (62 - 76)  BP: 150/60 (142/70 - 200/75)  BP(mean): --  RR: 16 (16 - 17)  SpO2: 100% (96% - 100%)      GENERAL: NAD, well-groomed, well-developed  HEAD:  Atraumatic, Normocephalic  EYES: EOMI, PERRLA, conjunctiva and sclera clear  ENMT: Moist mucous membranes  NECK: Supple, No JVD  NERVOUS SYSTEM:  Alert & Oriented X3, Motor Strength 5/5 B/L upper and lower extremities; DTRs 2+ intact and symmetric  CHEST/LUNG: Clear to auscultation bilaterally; No rales, rhonchi, wheezing, or rubs  HEART: Regular rate and rhythm; No murmurs, rubs, or gallops  ABDOMEN: Soft, Nontender, Nondistended; Bowel sounds present  EXTREMITIES:  2+ Peripheral Pulses, No clubbing, cyanosis, or edema    MEDICATIONS  (STANDING):  enoxaparin Injectable 40milliGRAM(s) SubCutaneous every 24 hours  cefTRIAXone   IVPB  IV Intermittent   cefTRIAXone   IVPB 1Gram(s) IV Intermittent every 24 hours  atorvastatin 10milliGRAM(s) Oral at bedtime  pantoprazole    Tablet 40milliGRAM(s) Oral before breakfast  cloNIDine 0.1milliGRAM(s) Oral three times a day  metoprolol 50milliGRAM(s) Oral two times a day  losartan 25milliGRAM(s) Oral daily    MEDICATIONS  (PRN):  acetaminophen   Tablet 650milliGRAM(s) Oral every 6 hours PRN For Temp greater than 38 C (100.4 F)  ondansetron Injectable 4milliGRAM(s) IV Push every 6 hours PRN Nausea                              13.1   7.0   )-----------( 186      ( 21 Feb 2017 07:28 )             38.6     21 Feb 2017 07:28    145    |  109    |  11     ----------------------------<  100    3.7     |  26     |  0.76     Ca    8.8        21 Feb 2017 07:28      CAPILLARY BLOOD GLUCOSE        Assessment and Plan:   · Assessment		  76 year old female with a PMHx notable for HTN, who presents to ED for evaluation of shortness , cough, and wheezing secondary to pneumonia      Problem/Plan - 1:  ·  Problem: CAP (community acquired pneumonia).  Plan: No evidence for sepsis  RVP negative  D/W dR newberry, R/Z day #3-> can switch to ceftin 500 BID for 5 more days upon d/c.    Problem/Plan - 2:  ·  Problem: Malignant hypertension.    lopressor 50mg BID  losartan 25 qd  clonindine .1 po TID   close outpatiet follow up    Problem/Plan - 3:  ·  Problem: Hyperlipidemia, unspecified hyperlipidemia type.  Plan: Low fat diet  lipitor 10 qhs.       dispo:  Today with outpatient follow up for her blood pressure.    Attending Attestation:   55 minutes spent on total encounter; more than 50% of the visit was spent counseling and/or coordinating care by the attending physician.     Plan discussed with Patient and nursing.
76 year old female with a PMHx notable for HTN, who presents to ED for evaluation of shortness , cough, and wheezing.  Her symptoms included cough with wheezing which began earlier today.  Reports mild chest heavinees.  Otherwise denies or fevers or chills.  No alcohol or tobacco use.   No past surgical history.     ED course: BP = 220/100 clonidine 0.1mg x 2 given followed by enalaprilat 1.25mg  --> BP improved to 144/63  CT chest angio with no evidence for PE   Scattered groundglass opacities in the bilateral LL which may reflect infiltrates or infectious or inflammatory etiology.  Patient was given levaquin IV.      : afebrile. BP remains uncontrolled. 155/61 after dose of vascotec IV.  Home BP meds have yet to be resumed. Denies cp/sob, dizziness or diplopia.           ICU Vital Signs Last 24 Hrs  T(C): 36.7, Max: 36.7 (-20 @ 16:05)  T(F): 98.1, Max: 98.1 (- @ 16:05)  HR: 71 (65 - 71)  BP: 155/62 (155/62 - 198/70)  BP(mean): --  ABP: --  ABP(mean): --  RR: 16 (16 - 16)  SpO2: 96% (96% - 100%)        GENERAL: NAD, well-groomed, well-developed  HEAD:  Atraumatic, Normocephalic  EYES: EOMI, PERRLA, conjunctiva and sclera clear  ENMT: Moist mucous membranes  NECK: Supple, No JVD  NERVOUS SYSTEM:  Alert & Oriented X3, Motor Strength 5/5 B/L upper and lower extremities; DTRs 2+ intact and symmetric  CHEST/LUNG: Clear to auscultation bilaterally; No rales, rhonchi, wheezing, or rubs  HEART: Regular rate and rhythm; No murmurs, rubs, or gallops  ABDOMEN: Soft, Nontender, Nondistended; Bowel sounds present  EXTREMITIES:  2+ Peripheral Pulses, No clubbing, cyanosis, or edema        MEDICATIONS  (STANDING):  enalaprilat Injectable 1.25milliGRAM(s) IV Push every 6 hours  enoxaparin Injectable 40milliGRAM(s) SubCutaneous every 24 hours  cefTRIAXone   IVPB  IV Intermittent   azithromycin   Tablet 500milliGRAM(s) Oral daily    MEDICATIONS  (PRN):  acetaminophen   Tablet 650milliGRAM(s) Oral every 6 hours PRN For Temp greater than 38 C (100.4 F)  ondansetron Injectable 4milliGRAM(s) IV Push every 6 hours PRN Nausea      LABS:                        13.4   7.1   )-----------( 207      ( 2017 14:26 )             39.4     2017 14:26    142    |  108    |  12     ----------------------------<  94     5.4     |  29     |  1.00     Ca    8.7        2017 14:26    TPro  7.7    /  Alb  3.5    /  TBili  0.6    /  DBili  x      /  AST  28     /  ALT  17     /  AlkPhos  99     2017 14:26    PT/INR - ( 2017 14:26 )   PT: 11.2 sec;   INR: 1.02 ratio         PTT - ( 2017 14:26 )  PTT:29.8 sec  Urinalysis Basic - ( 2017 14:26 )    Color: Yellow / Appearance: Clear / S.005 / pH: x  Gluc: x / Ketone: Negative  / Bili: Negative / Urobili: Negative mg/dL   Blood: x / Protein: 30 mg/dL / Nitrite: Negative   Leuk Esterase: Negative / RBC: 0-2 /HPF / WBC 0-2   Sq Epi: x / Non Sq Epi: Occasional / Bacteria: Occasional
76 year old female with a PMHx notable for HTN, who presents to ED for evaluation of shortness , cough, and wheezing.  Her symptoms included cough with wheezing which began earlier today.  Reports mild chest heavinees.  Otherwise denies or fevers or chills.  No alcohol or tobacco use.   No past surgical history.     ED course: BP = 220/100 clonidine 0.1mg x 2 given followed by enalaprilat 1.25mg  --> BP improved to 144/63  CT chest angio with no evidence for PE   Scattered groundglass opacities in the bilateral LL which may reflect infiltrates or infectious or inflammatory etiology.  Patient was given levaquin IV. (2017 21:48)      2017:   Pt seen and examined at bedside earlier today.  Feels improved today.  Afebrile.  Denies CP/dyspnea.     PHYSICAL EXAM:  T(C): 36.6, Max: 36.8 (- @ 05:51)  HR: 71 (51 - 71)  BP: 198/70 (150/56 - 198/70)  RR: 16 (16 - 18)  SpO2: 100% (100% - 100%)  Wt(kg): --   GENERAL: NAD, well-groomed, well-developed  HEAD:  Atraumatic, Normocephalic  EYES: EOMI, PERRLA, conjunctiva and sclera clear  ENMT: Moist mucous membranes  NECK: Supple, No JVD  NERVOUS SYSTEM:  Alert & Oriented X3, Motor Strength 5/5 B/L upper and lower extremities; DTRs 2+ intact and symmetric  CHEST/LUNG: Clear to auscultation bilaterally; No rales, rhonchi, wheezing, or rubs  HEART: Regular rate and rhythm; No murmurs, rubs, or gallops  ABDOMEN: Soft, Nontender, Nondistended; Bowel sounds present  EXTREMITIES:  2+ Peripheral Pulses, No clubbing, cyanosis, or edema        MEDICATIONS  (STANDING):  enalaprilat Injectable 1.25milliGRAM(s) IV Push every 6 hours  enoxaparin Injectable 40milliGRAM(s) SubCutaneous every 24 hours  cefTRIAXone   IVPB  IV Intermittent   azithromycin   Tablet 500milliGRAM(s) Oral daily    MEDICATIONS  (PRN):  acetaminophen   Tablet 650milliGRAM(s) Oral every 6 hours PRN For Temp greater than 38 C (100.4 F)  ondansetron Injectable 4milliGRAM(s) IV Push every 6 hours PRN Nausea      LABS:                        13.4   7.1   )-----------( 207      ( 2017 14:26 )             39.4     2017 14:26    142    |  108    |  12     ----------------------------<  94     5.4     |  29     |  1.00     Ca    8.7        2017 14:26    TPro  7.7    /  Alb  3.5    /  TBili  0.6    /  DBili  x      /  AST  28     /  ALT  17     /  AlkPhos  99     2017 14:26    PT/INR - ( 2017 14:26 )   PT: 11.2 sec;   INR: 1.02 ratio         PTT - ( 2017 14:26 )  PTT:29.8 sec  Urinalysis Basic - ( 2017 14:26 )    Color: Yellow / Appearance: Clear / S.005 / pH: x  Gluc: x / Ketone: Negative  / Bili: Negative / Urobili: Negative mg/dL   Blood: x / Protein: 30 mg/dL / Nitrite: Negative   Leuk Esterase: Negative / RBC: 0-2 /HPF / WBC 0-2   Sq Epi: x / Non Sq Epi: Occasional / Bacteria: Occasional

## 2017-02-24 DIAGNOSIS — Z88.8 ALLERGY STATUS TO OTHER DRUGS, MEDICAMENTS AND BIOLOGICAL SUBSTANCES STATUS: ICD-10-CM

## 2017-02-24 DIAGNOSIS — R06.02 SHORTNESS OF BREATH: ICD-10-CM

## 2017-02-24 DIAGNOSIS — J18.9 PNEUMONIA, UNSPECIFIED ORGANISM: ICD-10-CM

## 2017-02-24 DIAGNOSIS — H54.8 LEGAL BLINDNESS, AS DEFINED IN USA: ICD-10-CM

## 2017-02-24 DIAGNOSIS — I10 ESSENTIAL (PRIMARY) HYPERTENSION: ICD-10-CM

## 2017-02-24 DIAGNOSIS — M19.90 UNSPECIFIED OSTEOARTHRITIS, UNSPECIFIED SITE: ICD-10-CM

## 2017-02-24 DIAGNOSIS — E78.5 HYPERLIPIDEMIA, UNSPECIFIED: ICD-10-CM

## 2017-02-25 LAB
CULTURE RESULTS: SIGNIFICANT CHANGE UP
CULTURE RESULTS: SIGNIFICANT CHANGE UP
SPECIMEN SOURCE: SIGNIFICANT CHANGE UP
SPECIMEN SOURCE: SIGNIFICANT CHANGE UP

## 2017-02-27 LAB
METANEPH 24H UR-MRATE: SIGNIFICANT CHANGE UP
METANEPH UR-MCNC: SIGNIFICANT CHANGE UP
VMA SERPL-MCNC: 2 MG/24 H — SIGNIFICANT CHANGE UP
VMA SERPL-MCNC: 24 H — SIGNIFICANT CHANGE UP
VMA UR-MCNC: 300 ML — SIGNIFICANT CHANGE UP

## 2017-03-05 ENCOUNTER — EMERGENCY (EMERGENCY)
Facility: HOSPITAL | Age: 77
LOS: 0 days | Discharge: HOME | End: 2017-03-06
Attending: EMERGENCY MEDICINE | Admitting: EMERGENCY MEDICINE
Payer: MEDICARE

## 2017-03-05 VITALS
RESPIRATION RATE: 18 BRPM | SYSTOLIC BLOOD PRESSURE: 257 MMHG | TEMPERATURE: 99 F | HEART RATE: 62 BPM | DIASTOLIC BLOOD PRESSURE: 99 MMHG | OXYGEN SATURATION: 99 %

## 2017-03-05 DIAGNOSIS — M19.90 UNSPECIFIED OSTEOARTHRITIS, UNSPECIFIED SITE: ICD-10-CM

## 2017-03-05 DIAGNOSIS — I10 ESSENTIAL (PRIMARY) HYPERTENSION: ICD-10-CM

## 2017-03-05 DIAGNOSIS — Z98.89 OTHER SPECIFIED POSTPROCEDURAL STATES: Chronic | ICD-10-CM

## 2017-03-05 DIAGNOSIS — H54.8 LEGAL BLINDNESS, AS DEFINED IN USA: ICD-10-CM

## 2017-03-05 PROCEDURE — 99284 EMERGENCY DEPT VISIT MOD MDM: CPT | Mod: 25

## 2017-03-05 NOTE — ED ADULT NURSE NOTE - NS ED NURSE DC INFO COMPLEXITY
Verbalized Understanding/Returned Demonstration/Straightforward: Basic instructions, no meds, no home treatment/Patient asked questions

## 2017-03-05 NOTE — ED ADULT NURSE NOTE - CHPI ED SYMPTOMS NEG
no syncope/no dizziness/no diaphoresis/no vomiting/no shortness of breath/no nausea/no fever/no back pain/no cough/no chills

## 2017-03-05 NOTE — ED ADULT NURSE NOTE - OBJECTIVE STATEMENT
Pt. to the ED BIB Family C/O High blood pressure- Pt. states that she took Clonidine(Pt. don't remember amount) and Metoprolol 50mg at around  7pm tonight- Pt. states BP continued to be high and came to the ED- Denies SOB, CP and any neurological deficits- Hx. of HTN and states she has been on same meds for years, but are not working lately- IV, Labs and cardiac monitor in place- Family at the bedside, Will cont to monitor pt. closely- Safety maintained

## 2017-03-06 VITALS
RESPIRATION RATE: 16 BRPM | TEMPERATURE: 98 F | OXYGEN SATURATION: 100 % | DIASTOLIC BLOOD PRESSURE: 81 MMHG | HEART RATE: 55 BPM | SYSTOLIC BLOOD PRESSURE: 198 MMHG

## 2017-03-06 LAB
ALBUMIN SERPL ELPH-MCNC: 3.3 G/DL — SIGNIFICANT CHANGE UP (ref 3.3–5)
ALP SERPL-CCNC: 92 U/L — SIGNIFICANT CHANGE UP (ref 40–120)
ALT FLD-CCNC: 34 U/L — SIGNIFICANT CHANGE UP (ref 12–78)
ANION GAP SERPL CALC-SCNC: 7 MMOL/L — SIGNIFICANT CHANGE UP (ref 5–17)
APPEARANCE UR: CLEAR — SIGNIFICANT CHANGE UP
AST SERPL-CCNC: 39 U/L — HIGH (ref 15–37)
BACTERIA # UR AUTO: (no result)
BASOPHILS # BLD AUTO: 0.1 K/UL — SIGNIFICANT CHANGE UP (ref 0–0.2)
BASOPHILS NFR BLD AUTO: 1.4 % — SIGNIFICANT CHANGE UP (ref 0–2)
BILIRUB SERPL-MCNC: 0.4 MG/DL — SIGNIFICANT CHANGE UP (ref 0.2–1.2)
BILIRUB UR-MCNC: NEGATIVE — SIGNIFICANT CHANGE UP
BUN SERPL-MCNC: 13 MG/DL — SIGNIFICANT CHANGE UP (ref 7–23)
CALCIUM SERPL-MCNC: 8.8 MG/DL — SIGNIFICANT CHANGE UP (ref 8.5–10.1)
CHLORIDE SERPL-SCNC: 112 MMOL/L — HIGH (ref 96–108)
CO2 SERPL-SCNC: 27 MMOL/L — SIGNIFICANT CHANGE UP (ref 22–31)
COLOR SPEC: YELLOW — SIGNIFICANT CHANGE UP
CREAT SERPL-MCNC: 0.88 MG/DL — SIGNIFICANT CHANGE UP (ref 0.5–1.3)
DIFF PNL FLD: (no result)
EOSINOPHIL # BLD AUTO: 0.1 K/UL — SIGNIFICANT CHANGE UP (ref 0–0.5)
EOSINOPHIL NFR BLD AUTO: 0.8 % — SIGNIFICANT CHANGE UP (ref 0–6)
EPI CELLS # UR: SIGNIFICANT CHANGE UP
GLUCOSE SERPL-MCNC: 99 MG/DL — SIGNIFICANT CHANGE UP (ref 70–99)
GLUCOSE UR QL: NEGATIVE MG/DL — SIGNIFICANT CHANGE UP
HCT VFR BLD CALC: 36.2 % — SIGNIFICANT CHANGE UP (ref 34.5–45)
HGB BLD-MCNC: 12 G/DL — SIGNIFICANT CHANGE UP (ref 11.5–15.5)
KETONES UR-MCNC: NEGATIVE — SIGNIFICANT CHANGE UP
LEUKOCYTE ESTERASE UR-ACNC: NEGATIVE — SIGNIFICANT CHANGE UP
LYMPHOCYTES # BLD AUTO: 3.1 K/UL — SIGNIFICANT CHANGE UP (ref 1–3.3)
LYMPHOCYTES # BLD AUTO: 40.1 % — SIGNIFICANT CHANGE UP (ref 13–44)
MCHC RBC-ENTMCNC: 31 PG — SIGNIFICANT CHANGE UP (ref 27–34)
MCHC RBC-ENTMCNC: 33.2 GM/DL — SIGNIFICANT CHANGE UP (ref 32–36)
MCV RBC AUTO: 93.2 FL — SIGNIFICANT CHANGE UP (ref 80–100)
MONOCYTES # BLD AUTO: 0.5 K/UL — SIGNIFICANT CHANGE UP (ref 0–0.9)
MONOCYTES NFR BLD AUTO: 6.5 % — SIGNIFICANT CHANGE UP (ref 2–14)
NEUTROPHILS # BLD AUTO: 4 K/UL — SIGNIFICANT CHANGE UP (ref 1.8–7.4)
NEUTROPHILS NFR BLD AUTO: 51.2 % — SIGNIFICANT CHANGE UP (ref 43–77)
NITRITE UR-MCNC: NEGATIVE — SIGNIFICANT CHANGE UP
PH UR: 7 — SIGNIFICANT CHANGE UP (ref 4.8–8)
PLATELET # BLD AUTO: 218 K/UL — SIGNIFICANT CHANGE UP (ref 150–400)
POTASSIUM SERPL-MCNC: 3.9 MMOL/L — SIGNIFICANT CHANGE UP (ref 3.5–5.3)
POTASSIUM SERPL-SCNC: 3.9 MMOL/L — SIGNIFICANT CHANGE UP (ref 3.5–5.3)
PROT SERPL-MCNC: 6.9 GM/DL — SIGNIFICANT CHANGE UP (ref 6–8.3)
PROT UR-MCNC: NEGATIVE MG/DL — SIGNIFICANT CHANGE UP
RBC # BLD: 3.88 M/UL — SIGNIFICANT CHANGE UP (ref 3.8–5.2)
RBC # FLD: 12.4 % — SIGNIFICANT CHANGE UP (ref 10.3–14.5)
RBC CASTS # UR COMP ASSIST: SIGNIFICANT CHANGE UP /HPF (ref 0–4)
SODIUM SERPL-SCNC: 146 MMOL/L — HIGH (ref 135–145)
SP GR SPEC: 1 — LOW (ref 1.01–1.02)
UROBILINOGEN FLD QL: NEGATIVE MG/DL — SIGNIFICANT CHANGE UP
WBC # BLD: 7.8 K/UL — SIGNIFICANT CHANGE UP (ref 3.8–10.5)
WBC # FLD AUTO: 7.8 K/UL — SIGNIFICANT CHANGE UP (ref 3.8–10.5)
WBC UR QL: NEGATIVE — SIGNIFICANT CHANGE UP

## 2017-03-06 PROCEDURE — 93010 ELECTROCARDIOGRAM REPORT: CPT | Mod: 59

## 2017-03-06 PROCEDURE — 70450 CT HEAD/BRAIN W/O DYE: CPT | Mod: 26

## 2017-03-06 PROCEDURE — 71020: CPT | Mod: 26

## 2017-03-06 RX ADMIN — Medication 0.1 MILLIGRAM(S): at 03:03

## 2017-03-06 NOTE — ED PROVIDER NOTE - DETAILS:
Luba Ruvalcaba MD - The scribe's documentation has been prepared under my direction and personally reviewed by me in its entirety. I confirm that the note above accurately reflects all work, treatment, procedures, and medical decision making performed by me.

## 2017-03-06 NOTE — ED PROVIDER NOTE - OBJECTIVE STATEMENT
75 yo female with poorly controlled HTN complains of high blood pressure at home tonight.  Patient states many months of difficult to control BP, with multiple medication changes by PMD without good control.  Pt takes her BP "all day long" at home.  Tonight it was 260 systolic which prompted her to come to ED.  Mild headache at home, now resolved and without complaints.  Has an appointment to see the cardiologist in the morning regarding the BP issue.

## 2017-03-06 NOTE — ED ADULT NURSE REASSESSMENT NOTE - NS ED NURSE REASSESS COMMENT FT1
Pt. remains as previously assessed- Pt. denies CP and SOB- Pending CT results- Will cont to monitor closely- Family at the bedside

## 2017-03-06 NOTE — ED ADULT NURSE REASSESSMENT NOTE - NS ED NURSE REASSESS COMMENT FT1
Pt. D/C as ordered. Pt. continues to be hypertensive- denies HA and any neurological deficits- Medicated as ordered and Ok to be D/C as per Dr. Ruvalcaba- Pt. states she has appt with Cardiologist at 3pm today and will F/U- Pt. and daughter verbalizes understanding of D/C, and F/U including worsening of symptoms and to return to the ED- Safety maintained

## 2017-03-06 NOTE — ED PROVIDER NOTE - NS ED MD SCRIBE ATTENDING SCRIBE SECTIONS
CONSULTATIONS/SHIFT CHANGE/PAST MEDICAL/SURGICAL/SOCIAL HISTORY/OBSERVATION MONITORING PLAN/DISPOSITION/PHYSICAL EXAM/HIV/VITAL SIGNS( Pullset)/SCALES/INTAKE ASSESSMENT/SCREENINGS/RESULTS/MANDATORY DOCUMENTATION/PROVIDER CARE INITIATION/PROGRESS NOTE/REVIEW OF SYSTEMS/HISTORY OF PRESENT ILLNESS

## 2017-03-06 NOTE — ED PROVIDER NOTE - MEDICAL DECISION MAKING DETAILS
77 yo female with poorly controlled HTN, presented to ED hypertensive, gradually came down in /80 upon discharge, extra dose clonidine given.  Workup unremarkable.  pt to f/u cards in AM

## 2017-03-18 ENCOUNTER — INPATIENT (INPATIENT)
Facility: HOSPITAL | Age: 77
LOS: 1 days | Discharge: HOME | End: 2017-03-20
Attending: HOSPITALIST | Admitting: HOSPITALIST
Payer: MEDICARE

## 2017-03-18 VITALS
TEMPERATURE: 98 F | HEIGHT: 64 IN | WEIGHT: 179.9 LBS | OXYGEN SATURATION: 100 % | RESPIRATION RATE: 32 BRPM | HEART RATE: 88 BPM

## 2017-03-18 DIAGNOSIS — Z98.89 OTHER SPECIFIED POSTPROCEDURAL STATES: Chronic | ICD-10-CM

## 2017-03-18 LAB
ALBUMIN SERPL ELPH-MCNC: 3.4 G/DL — SIGNIFICANT CHANGE UP (ref 3.3–5)
ALP SERPL-CCNC: 98 U/L — SIGNIFICANT CHANGE UP (ref 40–120)
ALT FLD-CCNC: 15 U/L — SIGNIFICANT CHANGE UP (ref 12–78)
ANION GAP SERPL CALC-SCNC: 8 MMOL/L — SIGNIFICANT CHANGE UP (ref 5–17)
APTT BLD: 29.6 SEC — SIGNIFICANT CHANGE UP (ref 27.5–37.4)
AST SERPL-CCNC: 21 U/L — SIGNIFICANT CHANGE UP (ref 15–37)
BILIRUB SERPL-MCNC: 0.3 MG/DL — SIGNIFICANT CHANGE UP (ref 0.2–1.2)
BUN SERPL-MCNC: 17 MG/DL — SIGNIFICANT CHANGE UP (ref 7–23)
CALCIUM SERPL-MCNC: 9 MG/DL — SIGNIFICANT CHANGE UP (ref 8.5–10.1)
CHLORIDE SERPL-SCNC: 108 MMOL/L — SIGNIFICANT CHANGE UP (ref 96–108)
CO2 SERPL-SCNC: 28 MMOL/L — SIGNIFICANT CHANGE UP (ref 22–31)
CREAT SERPL-MCNC: 1 MG/DL — SIGNIFICANT CHANGE UP (ref 0.5–1.3)
GLUCOSE SERPL-MCNC: 102 MG/DL — HIGH (ref 70–99)
HCT VFR BLD CALC: 36.9 % — SIGNIFICANT CHANGE UP (ref 34.5–45)
HGB BLD-MCNC: 12.5 G/DL — SIGNIFICANT CHANGE UP (ref 11.5–15.5)
INR BLD: 1.08 RATIO — SIGNIFICANT CHANGE UP (ref 0.88–1.16)
LYMPHOCYTES # BLD AUTO: 41 % — SIGNIFICANT CHANGE UP (ref 13–44)
MANUAL DIF COMMENT BLD-IMP: SIGNIFICANT CHANGE UP
MCHC RBC-ENTMCNC: 31.3 PG — SIGNIFICANT CHANGE UP (ref 27–34)
MCHC RBC-ENTMCNC: 34 GM/DL — SIGNIFICANT CHANGE UP (ref 32–36)
MCV RBC AUTO: 92.2 FL — SIGNIFICANT CHANGE UP (ref 80–100)
MONOCYTES NFR BLD AUTO: 8 % — SIGNIFICANT CHANGE UP (ref 2–14)
NEUTROPHILS NFR BLD AUTO: 51 % — SIGNIFICANT CHANGE UP (ref 43–77)
NT-PROBNP SERPL-SCNC: 116 PG/ML — SIGNIFICANT CHANGE UP (ref 0–450)
PLAT MORPH BLD: NORMAL — SIGNIFICANT CHANGE UP
PLATELET # BLD AUTO: 209 K/UL — SIGNIFICANT CHANGE UP (ref 150–400)
POTASSIUM SERPL-MCNC: 3.3 MMOL/L — LOW (ref 3.5–5.3)
POTASSIUM SERPL-SCNC: 3.3 MMOL/L — LOW (ref 3.5–5.3)
PROT SERPL-MCNC: 7.3 GM/DL — SIGNIFICANT CHANGE UP (ref 6–8.3)
PROTHROM AB SERPL-ACNC: 11.9 SEC — SIGNIFICANT CHANGE UP (ref 10–13.1)
RBC # BLD: 4 M/UL — SIGNIFICANT CHANGE UP (ref 3.8–5.2)
RBC # FLD: 11.9 % — SIGNIFICANT CHANGE UP (ref 10.3–14.5)
RBC BLD AUTO: NORMAL — SIGNIFICANT CHANGE UP
SODIUM SERPL-SCNC: 144 MMOL/L — SIGNIFICANT CHANGE UP (ref 135–145)
TROPONIN I SERPL-MCNC: 0.05 NG/ML — HIGH (ref 0.01–0.04)
WBC # BLD: 7 K/UL — SIGNIFICANT CHANGE UP (ref 3.8–10.5)
WBC # FLD AUTO: 7 K/UL — SIGNIFICANT CHANGE UP (ref 3.8–10.5)

## 2017-03-18 PROCEDURE — 93010 ELECTROCARDIOGRAM REPORT: CPT

## 2017-03-18 PROCEDURE — 71010: CPT | Mod: 26

## 2017-03-18 PROCEDURE — 99285 EMERGENCY DEPT VISIT HI MDM: CPT

## 2017-03-18 RX ORDER — SODIUM CHLORIDE 9 MG/ML
3 INJECTION INTRAMUSCULAR; INTRAVENOUS; SUBCUTANEOUS ONCE
Qty: 0 | Refills: 0 | Status: COMPLETED | OUTPATIENT
Start: 2017-03-18 | End: 2017-03-18

## 2017-03-18 RX ORDER — FUROSEMIDE 40 MG
40 TABLET ORAL ONCE
Qty: 0 | Refills: 0 | Status: COMPLETED | OUTPATIENT
Start: 2017-03-18 | End: 2017-03-18

## 2017-03-18 RX ADMIN — SODIUM CHLORIDE 3 MILLILITER(S): 9 INJECTION INTRAMUSCULAR; INTRAVENOUS; SUBCUTANEOUS at 22:30

## 2017-03-18 NOTE — ED PROVIDER NOTE - OBJECTIVE STATEMENT
77 y/o female with h/o recently diagnosed CHF p/w sudden onset dyspnea and orthopnea tonight 1-2 hours PTA.  Pt afebrile. No CP. 75 y/o female with h/o recently diagnosed CHF p/w sudden onset dyspnea and orthopnea tonight 1-2 hours PTA.  Pt afebrile. No CP.  Pt recently d/c from hospital.

## 2017-03-18 NOTE — ED ADULT NURSE NOTE - OBJECTIVE STATEMENT
Pt presents to the ED with complaints of SOB and upper abdominal discomfort and distention that began this morning. Pt states She has had slight nausea as well that began this AM. Pt denies having fever, chills, vomiting, or diarrhea.

## 2017-03-19 DIAGNOSIS — I10 ESSENTIAL (PRIMARY) HYPERTENSION: ICD-10-CM

## 2017-03-19 DIAGNOSIS — E78.2 MIXED HYPERLIPIDEMIA: ICD-10-CM

## 2017-03-19 DIAGNOSIS — R07.9 CHEST PAIN, UNSPECIFIED: ICD-10-CM

## 2017-03-19 DIAGNOSIS — G45.8 OTHER TRANSIENT CEREBRAL ISCHEMIC ATTACKS AND RELATED SYNDROMES: ICD-10-CM

## 2017-03-19 LAB
ANION GAP SERPL CALC-SCNC: 7 MMOL/L — SIGNIFICANT CHANGE UP (ref 5–17)
APTT BLD: 199.8 SEC — CRITICAL HIGH (ref 27.5–37.4)
APTT BLD: >200 SEC — CRITICAL HIGH (ref 27.5–37.4)
BUN SERPL-MCNC: 16 MG/DL — SIGNIFICANT CHANGE UP (ref 7–23)
CALCIUM SERPL-MCNC: 8.9 MG/DL — SIGNIFICANT CHANGE UP (ref 8.5–10.1)
CHLORIDE SERPL-SCNC: 107 MMOL/L — SIGNIFICANT CHANGE UP (ref 96–108)
CHOLEST SERPL-MCNC: 114 MG/DL — SIGNIFICANT CHANGE UP (ref 10–199)
CK SERPL-CCNC: 56 U/L — SIGNIFICANT CHANGE UP (ref 26–192)
CO2 SERPL-SCNC: 28 MMOL/L — SIGNIFICANT CHANGE UP (ref 22–31)
CREAT SERPL-MCNC: 0.84 MG/DL — SIGNIFICANT CHANGE UP (ref 0.5–1.3)
GLUCOSE SERPL-MCNC: 107 MG/DL — HIGH (ref 70–99)
HCT VFR BLD CALC: 35.8 % — SIGNIFICANT CHANGE UP (ref 34.5–45)
HCT VFR BLD CALC: 38.5 % — SIGNIFICANT CHANGE UP (ref 34.5–45)
HDLC SERPL-MCNC: 49 MG/DL — SIGNIFICANT CHANGE UP (ref 40–125)
HGB BLD-MCNC: 11.8 G/DL — SIGNIFICANT CHANGE UP (ref 11.5–15.5)
HGB BLD-MCNC: 12.7 G/DL — SIGNIFICANT CHANGE UP (ref 11.5–15.5)
LIPID PNL WITH DIRECT LDL SERPL: 56 MG/DL — SIGNIFICANT CHANGE UP
MAGNESIUM SERPL-MCNC: 2.1 MG/DL — SIGNIFICANT CHANGE UP (ref 1.8–2.4)
MCHC RBC-ENTMCNC: 30.6 PG — SIGNIFICANT CHANGE UP (ref 27–34)
MCHC RBC-ENTMCNC: 30.8 PG — SIGNIFICANT CHANGE UP (ref 27–34)
MCHC RBC-ENTMCNC: 32.9 GM/DL — SIGNIFICANT CHANGE UP (ref 32–36)
MCHC RBC-ENTMCNC: 32.9 GM/DL — SIGNIFICANT CHANGE UP (ref 32–36)
MCV RBC AUTO: 93 FL — SIGNIFICANT CHANGE UP (ref 80–100)
MCV RBC AUTO: 93.6 FL — SIGNIFICANT CHANGE UP (ref 80–100)
PHOSPHATE SERPL-MCNC: 2.7 MG/DL — SIGNIFICANT CHANGE UP (ref 2.5–4.5)
PLATELET # BLD AUTO: 211 K/UL — SIGNIFICANT CHANGE UP (ref 150–400)
PLATELET # BLD AUTO: 218 K/UL — SIGNIFICANT CHANGE UP (ref 150–400)
POTASSIUM SERPL-MCNC: 3.9 MMOL/L — SIGNIFICANT CHANGE UP (ref 3.5–5.3)
POTASSIUM SERPL-SCNC: 3.9 MMOL/L — SIGNIFICANT CHANGE UP (ref 3.5–5.3)
RBC # BLD: 3.85 M/UL — SIGNIFICANT CHANGE UP (ref 3.8–5.2)
RBC # BLD: 4.11 M/UL — SIGNIFICANT CHANGE UP (ref 3.8–5.2)
RBC # FLD: 12.3 % — SIGNIFICANT CHANGE UP (ref 10.3–14.5)
RBC # FLD: 12.4 % — SIGNIFICANT CHANGE UP (ref 10.3–14.5)
SODIUM SERPL-SCNC: 142 MMOL/L — SIGNIFICANT CHANGE UP (ref 135–145)
TOTAL CHOLESTEROL/HDL RATIO MEASUREMENT: 2.3 RATIO — LOW (ref 3.3–7.1)
TRIGL SERPL-MCNC: 47 MG/DL — SIGNIFICANT CHANGE UP (ref 10–149)
TROPONIN I SERPL-MCNC: 0.15 NG/ML — HIGH (ref 0.01–0.04)
TROPONIN I SERPL-MCNC: 0.31 NG/ML — HIGH (ref 0.01–0.04)
WBC # BLD: 7.6 K/UL — SIGNIFICANT CHANGE UP (ref 3.8–10.5)
WBC # BLD: 7.7 K/UL — SIGNIFICANT CHANGE UP (ref 3.8–10.5)
WBC # FLD AUTO: 7.6 K/UL — SIGNIFICANT CHANGE UP (ref 3.8–10.5)
WBC # FLD AUTO: 7.7 K/UL — SIGNIFICANT CHANGE UP (ref 3.8–10.5)

## 2017-03-19 RX ORDER — LOSARTAN POTASSIUM 100 MG/1
25 TABLET, FILM COATED ORAL DAILY
Qty: 0 | Refills: 0 | Status: DISCONTINUED | OUTPATIENT
Start: 2017-03-19 | End: 2017-03-20

## 2017-03-19 RX ORDER — POTASSIUM CHLORIDE 20 MEQ
40 PACKET (EA) ORAL ONCE
Qty: 0 | Refills: 0 | Status: COMPLETED | OUTPATIENT
Start: 2017-03-19 | End: 2017-03-19

## 2017-03-19 RX ORDER — SIMVASTATIN 20 MG/1
10 TABLET, FILM COATED ORAL AT BEDTIME
Qty: 0 | Refills: 0 | Status: DISCONTINUED | OUTPATIENT
Start: 2017-03-19 | End: 2017-03-20

## 2017-03-19 RX ORDER — NITROGLYCERIN 6.5 MG
1 CAPSULE, EXTENDED RELEASE ORAL ONCE
Qty: 0 | Refills: 0 | Status: COMPLETED | OUTPATIENT
Start: 2017-03-19 | End: 2017-03-19

## 2017-03-19 RX ORDER — HEPARIN SODIUM 5000 [USP'U]/ML
INJECTION INTRAVENOUS; SUBCUTANEOUS
Qty: 25000 | Refills: 0 | Status: DISCONTINUED | OUTPATIENT
Start: 2017-03-19 | End: 2017-03-20

## 2017-03-19 RX ORDER — ONDANSETRON 8 MG/1
4 TABLET, FILM COATED ORAL EVERY 6 HOURS
Qty: 0 | Refills: 0 | Status: DISCONTINUED | OUTPATIENT
Start: 2017-03-19 | End: 2017-03-20

## 2017-03-19 RX ORDER — ACETAMINOPHEN 500 MG
650 TABLET ORAL EVERY 6 HOURS
Qty: 0 | Refills: 0 | Status: DISCONTINUED | OUTPATIENT
Start: 2017-03-19 | End: 2017-03-20

## 2017-03-19 RX ORDER — HEPARIN SODIUM 5000 [USP'U]/ML
5000 INJECTION INTRAVENOUS; SUBCUTANEOUS EVERY 6 HOURS
Qty: 0 | Refills: 0 | Status: DISCONTINUED | OUTPATIENT
Start: 2017-03-19 | End: 2017-03-20

## 2017-03-19 RX ORDER — HEPARIN SODIUM 5000 [USP'U]/ML
5000 INJECTION INTRAVENOUS; SUBCUTANEOUS ONCE
Qty: 0 | Refills: 0 | Status: COMPLETED | OUTPATIENT
Start: 2017-03-19 | End: 2017-03-19

## 2017-03-19 RX ORDER — HEPARIN SODIUM 5000 [USP'U]/ML
INJECTION INTRAVENOUS; SUBCUTANEOUS
Qty: 25000 | Refills: 0 | Status: DISCONTINUED | OUTPATIENT
Start: 2017-03-19 | End: 2017-03-19

## 2017-03-19 RX ORDER — METOPROLOL TARTRATE 50 MG
50 TABLET ORAL
Qty: 0 | Refills: 0 | Status: DISCONTINUED | OUTPATIENT
Start: 2017-03-19 | End: 2017-03-20

## 2017-03-19 RX ORDER — HEPARIN SODIUM 5000 [USP'U]/ML
5000 INJECTION INTRAVENOUS; SUBCUTANEOUS EVERY 6 HOURS
Qty: 0 | Refills: 0 | Status: DISCONTINUED | OUTPATIENT
Start: 2017-03-19 | End: 2017-03-19

## 2017-03-19 RX ORDER — ASPIRIN/CALCIUM CARB/MAGNESIUM 324 MG
81 TABLET ORAL DAILY
Qty: 0 | Refills: 0 | Status: DISCONTINUED | OUTPATIENT
Start: 2017-03-19 | End: 2017-03-20

## 2017-03-19 RX ADMIN — Medication 0.1 MILLIGRAM(S): at 13:25

## 2017-03-19 RX ADMIN — Medication 0.1 MILLIGRAM(S): at 06:35

## 2017-03-19 RX ADMIN — Medication 1 INCH(S): at 01:43

## 2017-03-19 RX ADMIN — HEPARIN SODIUM 0 UNIT(S)/HR: 5000 INJECTION INTRAVENOUS; SUBCUTANEOUS at 16:53

## 2017-03-19 RX ADMIN — HEPARIN SODIUM 1000 UNIT(S)/HR: 5000 INJECTION INTRAVENOUS; SUBCUTANEOUS at 01:42

## 2017-03-19 RX ADMIN — HEPARIN SODIUM 1000 UNIT(S)/HR: 5000 INJECTION INTRAVENOUS; SUBCUTANEOUS at 10:04

## 2017-03-19 RX ADMIN — Medication 0.1 MILLIGRAM(S): at 21:31

## 2017-03-19 RX ADMIN — Medication 81 MILLIGRAM(S): at 12:39

## 2017-03-19 RX ADMIN — LOSARTAN POTASSIUM 25 MILLIGRAM(S): 100 TABLET, FILM COATED ORAL at 10:17

## 2017-03-19 RX ADMIN — HEPARIN SODIUM 5000 UNIT(S): 5000 INJECTION INTRAVENOUS; SUBCUTANEOUS at 01:42

## 2017-03-19 RX ADMIN — Medication 40 MILLIEQUIVALENT(S): at 06:35

## 2017-03-19 RX ADMIN — Medication 50 MILLIGRAM(S): at 18:05

## 2017-03-19 RX ADMIN — SIMVASTATIN 10 MILLIGRAM(S): 20 TABLET, FILM COATED ORAL at 21:31

## 2017-03-19 RX ADMIN — Medication 1 INCH(S): at 13:24

## 2017-03-19 RX ADMIN — HEPARIN SODIUM 750 UNIT(S)/HR: 5000 INJECTION INTRAVENOUS; SUBCUTANEOUS at 17:56

## 2017-03-19 NOTE — ED ADULT NURSE REASSESSMENT NOTE - NS ED NURSE REASSESS COMMENT FT1
Pt and daughter updated as to laboratory results and current plan of care. Pt and family verbalize understanding of the plan of care. Will continue to monitor.

## 2017-03-19 NOTE — CONSULT NOTE ADULT - ASSESSMENT
76 y.o. female with PMH of HTN, dyslipidemia, TIA, moderate MR, h/o pre-DM, presents with chest pressure and shortness of breath.

## 2017-03-19 NOTE — ED ADULT NURSE REASSESSMENT NOTE - NS ED NURSE REASSESS COMMENT FT1
Pt and family updated as to plan of care and awaiting to go upstairs. Pt and family verbalize understanding of plan of care and have no questions at this time. Will continue to monitor.

## 2017-03-19 NOTE — H&P ADULT - NSHPPHYSICALEXAM_GEN_ALL_CORE
Vital Signs Last 24 Hrs  T(C): 36.4, Max: 36.4 (03-18 @ 22:04)  T(F): 97.5, Max: 97.5 (03-18 @ 22:04)  HR: 58 (58 - 88)  BP: 149/63 (149/63 - 167/77)  BP(mean): --  RR: 17 (17 - 32)  SpO2: 100% (99% - 100%)    GEN: appears comfortable  Neuro: AAOx3, nonfocal  HEENT: NC/AT, EOMI  Neck: no thyroidmegaly, no JVD  Cardiovascular: S1S2 present, regular rhythm, no murmur, mild tenderness on palpation  Respiratory: breath sounds normal bilaterally, no wheezing, no rales, no rhonchi  Gastrointestinal: bowel sounds normal, soft, no abdominal tenderness  Musculoskeletal: no muscle tenderness  Extremities: No edema  Skin: No rash

## 2017-03-19 NOTE — CONSULT NOTE ADULT - PROBLEM SELECTOR RECOMMENDATION 9
- recent cardiac PET with no ischemia, minimal TnI elevation- may be demand ischemia  - outpatient holter  - TTE to rule out LV dysfunction

## 2017-03-19 NOTE — H&P ADULT - HISTORY OF PRESENT ILLNESS
76 y.o. female with PMH of HTN, dyslipidemia, TIA, moderate MR, h/o pre-DM, presents with chest pressure and shortness of breath. Pt states was recently diagnosed with CHF. Pt states sleeping and woke up with palpitations and chest pain which lead to shortness of breath. Pt denies any fever, chills, n/v, abd pain, dysuria, diarrhea. Denies burning sensation of the chest. States the chest heaviness is still present and is mild.    In ED, pt received Lasix 40mg IV x1, nitro paste, heparin drip    PMHx: See HPI  PSHx:   Family History: Mother-HTN  Social History: denies x3  ROS: per HPI. All other ROS negative 76 y.o. female with PMH of HTN, dyslipidemia, TIA, moderate MR, h/o pre-DM, presents with chest pressure and shortness of breath. Pt states was recently diagnosed with CHF. Pt states sleeping and woke up with palpitations and chest pain which lead to shortness of breath. Pt denies any fever, chills, n/v, abd pain, dysuria, diarrhea. Denies burning sensation of the chest. States the chest heaviness is still present and is mild.    ED spoke with cardio, Dr Mckinnon, with recommendation of heparin drip.  In ED, pt received Lasix 40mg IV x1, nitro paste, heparin drip    PMHx: See HPI  PSHx:   Family History: Mother-HTN  Social History: denies x3  ROS: per HPI. All other ROS negative

## 2017-03-19 NOTE — ED ADULT NURSE REASSESSMENT NOTE - NS ED NURSE REASSESS COMMENT FT1
Pt and family updated as to current plan of care and need for heparin and admission. MD Mcguire at pt bedside. Pt being evaluated by MD, pt and family verbalize understanding of plan of care. Pt remains on Bedside monitor. Will continue to monitor.

## 2017-03-19 NOTE — ED ADULT NURSE REASSESSMENT NOTE - NS ED NURSE REASSESS COMMENT FT1
Report given to Olimpia on 3E, pt and daughter informed as to plan of care. Pt remains on bedside monitor while awaiting transport. Will continue to monitor.

## 2017-03-19 NOTE — ED ADULT NURSE REASSESSMENT NOTE - NS ED NURSE REASSESS COMMENT FT1
Pt seen by Cardiologist, MD Rabago informed pt on plan of care and plan to give heparin. Pt and family informed as to information on heparin and need for accurate weight. Pt states she has slight chest discomfort but less than when she presented to the ED. Pt remains on bedside monitor. Will continue to monitor.

## 2017-03-19 NOTE — H&P ADULT - ASSESSMENT
76 y.o. female with PMH of HTN, dyslipidemia, TIA, moderate MR, h/o pre-DM, presents with chest pressure and shortness of breath.    #chest pressure/SOB  #elevated troponin  #?CHF  -EKG: sinus bradycardia, HR 59, LVH, no ST-T changes  -admit to tele  -on heparin drip  -asa, statin  -trend cardiac enzymes  -echo  -cardio consult, Dr Piper    #hypokalemia  -replete and monitor    #HTN  -cont home meds  -hold HCTZ    #HLD  -statin    #hx TIA  -asa, statin

## 2017-03-19 NOTE — H&P ADULT - NSHPLABSRESULTS_GEN_ALL_CORE
12.5   7.0   )-----------( 209      ( 18 Mar 2017 22:48 )             36.9     18 Mar 2017 22:48    144    |  108    |  17     ----------------------------<  102    3.3     |  28     |  1.00     Ca    9.0        18 Mar 2017 22:48    TPro  7.3    /  Alb  3.4    /  TBili  0.3    /  DBili  x      /  AST  21     /  ALT  15     /  AlkPhos  98     18 Mar 2017 22:48    CARDIAC MARKERS ( 18 Mar 2017 22:48 )  0.049 ng/mL / x     / x     / x     / x

## 2017-03-19 NOTE — CONSULT NOTE ADULT - SUBJECTIVE AND OBJECTIVE BOX
CARDIOLOGY AND ELECTROPHYSIOLOGY ASSESSMENT    HPI:  76 y.o. female with PMH of HTN, dyslipidemia, TIA, moderate MR, h/o pre-DM, presents with chest pressure and shortness of breath. Pt states was recently diagnosed with CHF. Pt states sleeping and woke up with palpitations and chest pain which lead to shortness of breath. Pt denies any fever, chills, n/v, abd pain, dysuria, diarrhea. Denies burning sensation of the chest. States the chest heaviness is still present and is mild.    ED spoke with cardio, Dr Mckinnon, with recommendation of heparin drip.  In ED, pt received Lasix 40mg IV x1, nitro paste, heparin drip    PMHx: See HPI  PSHx:   Family History: Mother-HTN  Social History: denies x3  ROS: per HPI. All other ROS negative (19 Mar 2017 02:05)      PAST MEDICAL & SURGICAL HISTORY:  Arthritis  DJD (degenerative joint disease)  Legally blind  Cataract: bilateral  HTN (hypertension)  History of       MEDICATIONS  (STANDING):  cloNIDine 0.1milliGRAM(s) Oral three times a day  losartan 25milliGRAM(s) Oral daily  simvastatin 10milliGRAM(s) Oral at bedtime  metoprolol 50milliGRAM(s) Oral two times a day  aspirin  chewable 81milliGRAM(s) Oral daily  heparin  Infusion. Unit(s)/Hr IV Continuous <Continuous>    MEDICATIONS  (PRN):  acetaminophen   Tablet 650milliGRAM(s) Oral every 6 hours PRN For Temp greater than 38 C (100.4 F)  ondansetron Injectable 4milliGRAM(s) IV Push every 6 hours PRN Nausea  heparin  Injectable 5000Unit(s) IV Push every 6 hours PRN For aPTT less than 40      Allergies    Aggrenox (Unknown)    Intolerances        SOCIAL HISTORY: Denies tobacco, etoh abuse or illicit drug use    FAMILY HISTORY:  No pertinent family history in first degree relatives      Vital Signs Last 24 Hrs  T(C): 36.4, Max: 36.6 ( @ 06:09)  T(F): 97.5, Max: 97.8 ( @ 06:09)  HR: 56 (54 - 88)  BP: 116/50 (116/50 - 167/77)  BP(mean): 66 (66 - 66)  RR: 19 (15 - 32)  SpO2: 99% (99% - 100%)    REVIEW OF SYSTEMS:    CONSTITUTIONAL:  As per HPI.  HEENT:  Eyes:  No diplopia or blurred vision. ENT:  No earache, sore throat or runny nose.  CARDIOVASCULAR:  No pressure, squeezing, strangling, tightness, heaviness or aching about the chest, neck, axilla or epigastrium.  RESPIRATORY:  No cough, shortness of breath, PND or orthopnea.  GASTROINTESTINAL:  No nausea, vomiting or diarrhea.  GENITOURINARY:  No dysuria, frequency or urgency.  MUSCULOSKELETAL:  As per HPI.  SKIN:  No change in skin, hair or nails.  NEUROLOGIC:  No paresthesias, fasciculations, seizures or weakness.  PSYCHIATRIC:  No disorder of thought or mood.  ENDOCRINE:  No heat or cold intolerance, polyuria or polydipsia.  HEMATOLOGICAL:  No easy bruising or bleedings:  .     PHYSICAL EXAMINATION:    GENERAL APPEARANCE:  Pt. is not currently dyspneic, in no distress. Pt. is alert, oriented, and pleasant.  HEENT:  Pupils are normal and react normally. No icterus. Mucous membranes well colored.  NECK:  Supple. No lymphadenopathy. Jugular venous pressure not elevated. Carotids equal.   HEART:   The cardiac impulse has a normal quality. There are no murmurs, rubs or gallops noted  CHEST:  Chest is clear to auscultation. Normal respiratory effort.  ABDOMEN:  Soft and nontender.   EXTREMITIES:  There is no edema.   SKIN:  No rash or significant lesions are noted.    I&O's Summary      LABS:                        12.7   7.7   )-----------( 218      ( 19 Mar 2017 07:24 )             38.5   19 Mar 2017 07:24    142    |  107    |  16     ----------------------------<  107    3.9     |  28     |  0.84     Ca    8.9        19 Mar 2017 07:24  Phos  2.7       19 Mar 2017 07:24  Mg     2.1       19 Mar 2017 07:24    TPro  7.3    /  Alb  3.4    /  TBili  0.3    /  DBili  x      /  AST  21     /  ALT  15     /  AlkPhos  98     18 Mar 2017 22:48  LIVER FUNCTIONS - ( 18 Mar 2017 22:48 )  Alb: 3.4 g/dL / Pro: 7.3 gm/dL / ALK PHOS: 98 U/L / ALT: 15 U/L / AST: 21 U/L / GGT: x           PT/INR - ( 18 Mar 2017 22:48 )   PT: 11.9 sec;   INR: 1.08 ratio         PTT - ( 19 Mar 2017 07:30 )  PTT:>200.0 secCARDIAC MARKERS ( 19 Mar 2017 07:24 )  0.154 ng/mL / x     / 56 U/L / x     / x      CARDIAC MARKERS ( 19 Mar 2017 02:55 )  0.313 ng/mL / x     / x     / x     / x      CARDIAC MARKERS ( 18 Mar 2017 22:48 )  0.049 ng/mL / x     / x     / x     / x                EKG:    Ventricular Rate 59 BPM  Atrial Rate 59 BPM  P-R Interval 192 ms  QRS Duration 102 ms  Q-T Interval 436 ms  QTC Calculation(Bezet) 431 ms  P Axis 65 degrees  R Axis -11 degrees  T Axis 32 degrees  Diagnosis Line Sinus bradycardia  Voltage criteria for left ventricular hypertrophy  Early repolarization- exclude injury, pericarditis, vs. normal variant Anterior leads      When compared with ECG of 06-MAR-2017 01:32, Similar to prior ekg        Confirmed by YOGESH PARSONS MD ([id number]) on 3/19/2017 11:52:18 AM    TELEMETRY:    Normal sinus rhythm with no tachy or minh events      CARDIAC TESTS:    Outpatient PET on 3/13/17:  Conclusion: 1. Normal heart rate and blood pressure response during regadenoson infusion. 2. Normal EKG response to regadenoson. 3. This is a normal PET myocardial perfusion imaging stress test with no evidence of stress-induced ischemia or large areas of infarction. 4. Normal wall motion and contractility at rest as well as normal augmentation with regadenoson. 5. Rest ejection fraction of 73% and the stress ejection fraction augmented to 79%. 6. Normal coronary flow analysis     RADIOLOGY & ADDITIONAL STUDIES:    Impression:    No acute disease    No significant interval change as compared to  3/6/2017              KIRAN KAMARA M.D., ATTENDING RADIOLOGIST  This document has been electronically signed. Mar 19 2017  8:24AM

## 2017-03-19 NOTE — PROGRESS NOTE ADULT - SUBJECTIVE AND OBJECTIVE BOX
Patient is a 76y old  Female who presents with a chief complaint of CP/SOB (19 Mar 2017 02:05)      SUBJECTIVE:   HPI:  76 y.o. female with PMH of HTN, dyslipidemia, TIA, moderate MR, h/o pre-DM, presents with chest pressure that woke her from sleep at 10pm last night and shortness of breath. Recently had PET with Dr Piper office last week. Has experienced exertional SOB and CP for 1-2 weeks. No hx of premature CAD in 1st degree relative. PResently has mild retrosternal CP now      PSHx:   Family History: Mother-HTN  Social History: denies x3  ROS: per HPI. All other ROS negative (19 Mar 2017 02:05)        REVIEW OF SYSTEMS:    CONSTITUTIONAL: No weakness, fevers or chills  EYES/ENT: No visual changes;  No vertigo or throat pain   NECK: No pain or stiffness  RESPIRATORY: No cough, wheezing, hemoptysis; No shortness of breath  CARDIOVASCULAR: No chest pain or palpitations  GASTROINTESTINAL: No abdominal or epigastric pain. No nausea, vomiting, or hematemesis; No diarrhea or constipation. No melena or hematochezia.  GENITOURINARY: No dysuria, frequency or hematuria  NEUROLOGICAL: No numbness or weakness  SKIN: No itching, burning, rashes, or lesions   All other review of systems is negative unless indicated above    Height (cm): 162 ( @ 06:09)  Weight (kg): 81.5 ( @ 06:09)  BMI (kg/m2): 31.1 ( @ 06:09)  BSA (m2): 1.86 ( @ 06:09)    Vital Signs Last 24 Hrs  T(C): 36.6, Max: 36.6 ( @ 06:09)  T(F): 97.8, Max: 97.8 ( @ 06:09)  HR: 56 (54 - 88)  BP: 151/56 (141/65 - 167/77)  BP(mean): --  RR: 15 (15 - 32)  SpO2: 100% (99% - 100%)    I&O's Summary      CAPILLARY BLOOD GLUCOSE      PHYSICAL EXAM:    Constitutional: NAD, awake and alert, well-developed  HEENT: PERR, EOMI, Normal Hearing, MMM  Neck: Soft and supple, No LAD, No JVD  Respiratory: Breath sounds are clear bilaterally, No wheezing, rales or rhonchi  Cardiovascular: S1 and S2, regular rate and rhythm, no Murmurs, gallops or rubs  Gastrointestinal: Bowel Sounds present, soft, nontender, nondistended, no guarding, no rebound  Extremities: No peripheral edema  Vascular: 2+ peripheral pulses  Neurological: A/O x 3, no focal deficits  Musculoskeletal: 5/5 strength b/l upper and lower extremities  Skin: No rashes    MEDICATIONS:  MEDICATIONS  (STANDING):  cloNIDine 0.1milliGRAM(s) Oral three times a day  losartan 25milliGRAM(s) Oral daily  simvastatin 10milliGRAM(s) Oral at bedtime  metoprolol 50milliGRAM(s) Oral two times a day  aspirin  chewable 81milliGRAM(s) Oral daily  heparin  Infusion. Unit(s)/Hr IV Continuous <Continuous>      LABS: All Labs Reviewed:                        12.7   7.7   )-----------( 218      ( 19 Mar 2017 07:24 )             38.5     19 Mar 2017 07:24    142    |  107    |  16     ----------------------------<  107    3.9     |  28     |  0.84     Ca    8.9        19 Mar 2017 07:24  Phos  2.7       19 Mar 2017 07:24  Mg     2.1       19 Mar 2017 07:24    TPro  7.3    /  Alb  3.4    /  TBili  0.3    /  DBili  x      /  AST  21     /  ALT  15     /  AlkPhos  98     18 Mar 2017 22:48    PT/INR - ( 18 Mar 2017 22:48 )   PT: 11.9 sec;   INR: 1.08 ratio         PTT - ( 19 Mar 2017 07:30 )  PTT:>200.0 sec  CARDIAC MARKERS ( 19 Mar 2017 07:24 )  0.154 ng/mL / x     / 56 U/L / x     / x      CARDIAC MARKERS ( 19 Mar 2017 02:55 )  0.313 ng/mL / x     / x     / x     / x      CARDIAC MARKERS ( 18 Mar 2017 22:48 )  0.049 ng/mL / x     / x     / x     / x              Blood Culture:     RADIOLOGY/EKG:

## 2017-03-20 VITALS
HEART RATE: 51 BPM | TEMPERATURE: 98 F | OXYGEN SATURATION: 100 % | SYSTOLIC BLOOD PRESSURE: 114 MMHG | DIASTOLIC BLOOD PRESSURE: 60 MMHG

## 2017-03-20 LAB
ANION GAP SERPL CALC-SCNC: 9 MMOL/L — SIGNIFICANT CHANGE UP (ref 5–17)
APTT BLD: 117.5 SEC — HIGH (ref 27.5–37.4)
APTT BLD: 70.2 SEC — HIGH (ref 27.5–37.4)
BUN SERPL-MCNC: 15 MG/DL — SIGNIFICANT CHANGE UP (ref 7–23)
CALCIUM SERPL-MCNC: 9 MG/DL — SIGNIFICANT CHANGE UP (ref 8.5–10.1)
CHLORIDE SERPL-SCNC: 108 MMOL/L — SIGNIFICANT CHANGE UP (ref 96–108)
CO2 SERPL-SCNC: 28 MMOL/L — SIGNIFICANT CHANGE UP (ref 22–31)
CREAT SERPL-MCNC: 0.78 MG/DL — SIGNIFICANT CHANGE UP (ref 0.5–1.3)
GLUCOSE SERPL-MCNC: 96 MG/DL — SIGNIFICANT CHANGE UP (ref 70–99)
HCT VFR BLD CALC: 35.4 % — SIGNIFICANT CHANGE UP (ref 34.5–45)
HGB BLD-MCNC: 11.5 G/DL — SIGNIFICANT CHANGE UP (ref 11.5–15.5)
MCHC RBC-ENTMCNC: 30.5 PG — SIGNIFICANT CHANGE UP (ref 27–34)
MCHC RBC-ENTMCNC: 32.4 GM/DL — SIGNIFICANT CHANGE UP (ref 32–36)
MCV RBC AUTO: 94.2 FL — SIGNIFICANT CHANGE UP (ref 80–100)
PLATELET # BLD AUTO: 198 K/UL — SIGNIFICANT CHANGE UP (ref 150–400)
POTASSIUM SERPL-MCNC: 4.2 MMOL/L — SIGNIFICANT CHANGE UP (ref 3.5–5.3)
POTASSIUM SERPL-SCNC: 4.2 MMOL/L — SIGNIFICANT CHANGE UP (ref 3.5–5.3)
RBC # BLD: 3.76 M/UL — LOW (ref 3.8–5.2)
RBC # FLD: 12.6 % — SIGNIFICANT CHANGE UP (ref 10.3–14.5)
SODIUM SERPL-SCNC: 145 MMOL/L — SIGNIFICANT CHANGE UP (ref 135–145)
WBC # BLD: 5.7 K/UL — SIGNIFICANT CHANGE UP (ref 3.8–10.5)
WBC # FLD AUTO: 5.7 K/UL — SIGNIFICANT CHANGE UP (ref 3.8–10.5)

## 2017-03-20 PROCEDURE — 93010 ELECTROCARDIOGRAM REPORT: CPT

## 2017-03-20 PROCEDURE — 93306 TTE W/DOPPLER COMPLETE: CPT | Mod: 26

## 2017-03-20 RX ADMIN — Medication 0.1 MILLIGRAM(S): at 05:41

## 2017-03-20 RX ADMIN — Medication 0.1 MILLIGRAM(S): at 14:36

## 2017-03-20 RX ADMIN — Medication 81 MILLIGRAM(S): at 14:35

## 2017-03-20 RX ADMIN — LOSARTAN POTASSIUM 25 MILLIGRAM(S): 100 TABLET, FILM COATED ORAL at 09:35

## 2017-03-20 RX ADMIN — HEPARIN SODIUM 500 UNIT(S)/HR: 5000 INJECTION INTRAVENOUS; SUBCUTANEOUS at 03:01

## 2017-03-20 RX ADMIN — HEPARIN SODIUM 0 UNIT(S)/HR: 5000 INJECTION INTRAVENOUS; SUBCUTANEOUS at 02:02

## 2017-03-20 NOTE — PROGRESS NOTE ADULT - ASSESSMENT
Patient now s/p angiogram that revealed nonobstructive disease.  - medical management  - f/u with Dr Mckinnon in 1-2 weeks

## 2017-03-20 NOTE — PROGRESS NOTE ADULT - SUBJECTIVE AND OBJECTIVE BOX
Patient is a 76y old  Female who presents with a chief complaint of CP/SOB (19 Mar 2017 02:05)      SUBJECTIVE:   HPI:  76 y.o. female with PMH of HTN, dyslipidemia, TIA, moderate MR, h/o pre-DM, presents with chest pressure that woke her from sleep at 10pm last night and shortness of breath. Recently had PET with Dr Piper office last week. Has experienced exertional SOB and CP for 1-2 weeks. No hx of premature CAD in 1st degree relative. PResently has mild retrosternal CP now    3/20: has mild retrosternal cp. plan for [facility] today      PSHx:   Family History: Mother-HTN  Social History: denies x3  ROS: per HPI. All other ROS negative (19 Mar 2017 02:05)        REVIEW OF SYSTEMS:    CONSTITUTIONAL: No weakness, fevers or chills  EYES/ENT: No visual changes;  No vertigo or throat pain   NECK: No pain or stiffness  RESPIRATORY: No cough, wheezing, hemoptysis; No shortness of breath  CARDIOVASCULAR: No chest pain or palpitations  GASTROINTESTINAL: No abdominal or epigastric pain. No nausea, vomiting, or hematemesis; No diarrhea or constipation. No melena or hematochezia.  GENITOURINARY: No dysuria, frequency or hematuria  NEUROLOGICAL: No numbness or weakness  SKIN: No itching, burning, rashes, or lesions   All other review of systems is negative unless indicated above    Height (cm): 162 ( @ 06:09)  Weight (kg): 81.5 ( @ 06:09)  BMI (kg/m2): 31.1 ( @ 06:09)  BSA (m2): 1.86 ( @ 06:09)    Vital Signs Last 24 Hrs  T(C): 36.6, Max: 36.6 ( @ 06:09)  T(F): 97.8, Max: 97.8 ( @ 06:09)  HR: 56 (54 - 88)  BP: 151/56 (141/65 - 167/77)  BP(mean): --  RR: 15 (15 - 32)  SpO2: 100% (99% - 100%)    I&O's Summary      CAPILLARY BLOOD GLUCOSE      PHYSICAL EXAM:    Constitutional: NAD, awake and alert, well-developed  HEENT: PERR, EOMI, Normal Hearing, MMM  Neck: Soft and supple, No LAD, No JVD  Respiratory: Breath sounds are clear bilaterally, No wheezing, rales or rhonchi  Cardiovascular: S1 and S2, regular rate and rhythm, no Murmurs, gallops or rubs  Gastrointestinal: Bowel Sounds present, soft, nontender, nondistended, no guarding, no rebound  Extremities: No peripheral edema  Vascular: 2+ peripheral pulses  Neurological: A/O x 3, no focal deficits  Musculoskeletal: 5/5 strength b/l upper and lower extremities  Skin: No rashes    MEDICATIONS:  MEDICATIONS  (STANDING):  cloNIDine 0.1milliGRAM(s) Oral three times a day  losartan 25milliGRAM(s) Oral daily  simvastatin 10milliGRAM(s) Oral at bedtime  metoprolol 50milliGRAM(s) Oral two times a day  aspirin  chewable 81milliGRAM(s) Oral daily  heparin  Infusion. Unit(s)/Hr IV Continuous <Continuous>      LABS: All Labs Reviewed:                        12.7   7.7   )-----------( 218      ( 19 Mar 2017 07:24 )             38.5     19 Mar 2017 07:24    142    |  107    |  16     ----------------------------<  107    3.9     |  28     |  0.84     Ca    8.9        19 Mar 2017 07:24  Phos  2.7       19 Mar 2017 07:24  Mg     2.1       19 Mar 2017 07:24    TPro  7.3    /  Alb  3.4    /  TBili  0.3    /  DBili  x      /  AST  21     /  ALT  15     /  AlkPhos  98     18 Mar 2017 22:48    PT/INR - ( 18 Mar 2017 22:48 )   PT: 11.9 sec;   INR: 1.08 ratio         PTT - ( 19 Mar 2017 07:30 )  PTT:>200.0 sec  CARDIAC MARKERS ( 19 Mar 2017 07:24 )  0.154 ng/mL / x     / 56 U/L / x     / x      CARDIAC MARKERS ( 19 Mar 2017 02:55 )  0.313 ng/mL / x     / x     / x     / x      CARDIAC MARKERS ( 18 Mar 2017 22:48 )  0.049 ng/mL / x     / x     / x     / x              Blood Culture:     RADIOLOGY/EKG:

## 2017-03-20 NOTE — DISCHARGE NOTE ADULT - MEDICATION SUMMARY - MEDICATIONS TO TAKE
I will START or STAY ON the medications listed below when I get home from the hospital:    aspirin 81 mg oral tablet, chewable  -- 1 tab(s) by mouth once a day  -- Indication: For CAD prevention    losartan 25 mg oral tablet  -- 1 tab(s) by mouth once a day  -- Indication: For HTN (hypertension)    cloNIDine 0.1 mg oral tablet  -- 1 milligram(s) by mouth 3 times a day  -- Indication: For HTN (hypertension)    cloNIDine 0.1 mg oral tablet  -- 1 tab(s) by mouth 3 times a day  -- It is very important that you take or use this exactly as directed.  Do not skip doses or discontinue unless directed by your doctor.  May cause drowsiness.  Alcohol may intensify this effect.  Use care when operating dangerous machinery.  Some non-prescription drugs may aggravate your condition.  Read all labels carefully.  If a warning appears, check with your doctor before taking.    -- Indication: For HTN (hypertension)    simvastatin 10 mg oral tablet  -- 1 tab(s) by mouth once a day (at bedtime)  -- Indication: For dyslipidemia    metoprolol tartrate 50 mg oral tablet  -- 1 tab(s) by mouth 2 times a day  -- Indication: For HTN (hypertension)    hydroCHLOROthiazide  --  by mouth   -- Indication: For HTN (hypertension)

## 2017-03-20 NOTE — DISCHARGE NOTE ADULT - CARE PROVIDER_API CALL
Marvel Piper (), Cardiology; Internal Medicine  [address] [place] [address]  Phone: [phone]  Fax: [phone]    Co, August NESS), Internal Medicine; Pulmonary Disease  [address] [place] [address]  Phone: [phone]  Fax: [phone]

## 2017-03-20 NOTE — PROGRESS NOTE ADULT - SUBJECTIVE AND OBJECTIVE BOX
Patient is a 76y old  Female who presents with a chief complaint of CP/SOB .      HPI:  76 y.o. female with PMH of HTN, dyslipidemia, TIA, moderate MR, h/o pre-DM, presents with chest pressure and shortness of breath. Pt states was recently diagnosed with CHF. Pt states sleeping and woke up with palpitations and chest pain which lead to shortness of breath.   Pt states the pain is in the lower substernal area without any radiation. She had intermittent episodes before but as intense as this time and pt presented to the ER and her CP resolved after nitro paste and subsequently heparin drip was started.  ED spoke with cardio, Dr Mckinnon, with recommendation of heparin drip.  In ED, pt received Lasix 40mg IV x1, nitro paste, heparin drip    This am she denies any chest pain. She is a poor historian.      PMHx: See HPI  PSHx:   Family History: Mother-HTN  Social History: denies x3  ROS: per HPI. All other ROS negative (19 Mar 2017 02:05)      PAST MEDICAL & SURGICAL HISTORY:  Arthritis  DJD (degenerative joint disease)  Legally blind  Cataract: bilateral  HTN (hypertension)  History of       MEDICATIONS  (STANDING):  cloNIDine 0.1milliGRAM(s) Oral three times a day  losartan 25milliGRAM(s) Oral daily  simvastatin 10milliGRAM(s) Oral at bedtime  metoprolol 50milliGRAM(s) Oral two times a day  aspirin  chewable 81milliGRAM(s) Oral daily  heparin  Infusion. Unit(s)/Hr IV Continuous <Continuous>    MEDICATIONS  (PRN):  acetaminophen   Tablet 650milliGRAM(s) Oral every 6 hours PRN For Temp greater than 38 C (100.4 F)  ondansetron Injectable 4milliGRAM(s) IV Push every 6 hours PRN Nausea  heparin  Injectable 5000Unit(s) IV Push every 6 hours PRN For aPTT less than 40      FAMILY HISTORY:  No pertinent family history in first degree relatives      SOCIAL HISTORY:    REVIEW OF SYSTEMS:  CONSTITUTIONAL:  No night sweats.  No fatigue, malaise, lethargy.  No fever or chills.  HEENT:  Eyes:  No visual changes.  No eye pain.      ENT:  No runny nose.  No epistaxis.  No sinus pain.  No sore throat.  No odynophagia.  No ear pain.  No congestion.  RESPIRATORY:  No cough.  No wheeze.  No hemoptysis.  No shortness of breath.  CARDIOVASCULAR:  No chest pains.  No palpitations. No shortness of breath, No orthopnea or PND.  GASTROINTESTINAL:  No abdominal pain.  No nausea or vomiting.  No diarrhea or constipation.  No hematemesis.  No hematochezia.  No melena.  GENITOURINARY:  No urgency.  No frequency.  No dysuria.  No hematuria.  No obstructive symptoms.  No discharge.  No pain.  No significant abnormal bleeding.  MUSCULOSKELETAL:  No musculoskeletal pain.  No joint swelling.  No arthritis.  NEUROLOGICAL:  No tingling or numbness or weakness.  PSYCHIATRIC:  No confusion  SKIN:  No rashes.  No lesions.  No wounds.  ENDOCRINE:  No unexplained weight loss.  No polydipsia.  No polyuria.  No polyphagia.  HEMATOLOGIC:  No anemia.  No purpura.  No petechiae.  No prolonged or excessive bleeding.   ALLERGIC AND IMMUNOLOGIC:  No pruritus.  No swelling.         Vital Signs Last 24 Hrs  T(C): 36.2, Max: 36.4 (-19 @ 10:56)  T(F): 97.2, Max: 97.6 (03-19 @ 16:42)  HR: 49 (49 - 56)  BP: 135/63 (116/50 - 135/63)  BP(mean): 82 (66 - 82)  RR: 18 (18 - 20)  SpO2: 99% (99% - 100%)    PHYSICAL EXAM-    Constitutional: The patient appears to be normal, well developed, well nourished and alert and oriented to time, place and person. The patient does not appear acutely ill.     Head: Head is normocephalic and atraumatic.      Neck: The patient's neck is supple without enlargement, has no palpable thyromegaly nor thyroid nodules and has no jugular venous distention. No audible carotid bruits. There are strong carotid pulses bilaterally. No JVD.     Cardiovascular: Regular rate and rhythm without S3, S4. No murmurs or rubs are appreciated.      Respiratory: Breath sounds are normal. No rales. No wheezing.    Abdomen: Soft, nontender, nondistended with positive bowel sounds.      Extremity: No tenderness. No  pitting edema No skin discoloration No clubbing No cyanosis.     Neurologic: The patient is alert and oriented.      Skin: No rash, no obvious lesions noted.      Psychiatric: The patient appears to be emotionally stable.      INTERPRETATION OF TELEMETRY: sinus bradycardia 40-50s/min    ECG: Sinus rythm, normal axis, no ST T changes.    I&O's Detail    I & Os for current day (as of 20 Mar 2017 09:40)  =============================================  IN:    heparin  Infusion.: 69.1 ml    Total IN: 69.1 ml  ---------------------------------------------  OUT:    Total OUT: 0 ml  ---------------------------------------------  Total NET: 69.1 ml      LABS:                        11.5   5.7   )-----------( 198      ( 20 Mar 2017 05:15 )             35.4     20 Mar 2017 05:15    145    |  108    |  15     ----------------------------<  96     4.2     |  28     |  0.78     Ca    9.0        20 Mar 2017 05:15  Phos  2.7       19 Mar 2017 07:24  Mg     2.1       19 Mar 2017 07:24    TPro  7.3    /  Alb  3.4    /  TBili  0.3    /  DBili  x      /  AST  21     /  ALT  15     /  AlkPhos  98     18 Mar 2017 22:48    CARDIAC MARKERS ( 19 Mar 2017 07:24 )  0.154 ng/mL / x     / 56 U/L / x     / x      CARDIAC MARKERS ( 19 Mar 2017 02:55 )  0.313 ng/mL / x     / x     / x     / x      CARDIAC MARKERS ( 18 Mar 2017 22:48 )  0.049 ng/mL / x     / x     / x     / x          PT/INR - ( 18 Mar 2017 22:48 )   PT: 11.9 sec;   INR: 1.08 ratio         PTT - ( 20 Mar 2017 00:24 )  PTT:117.5 sec    I&O's Summary    I & Os for current day (as of 20 Mar 2017 09:40)  =============================================  IN: 69.1 ml / OUT: 0 ml / NET: 69.1 ml    BNP  RADIOLOGY & ADDITIONAL STUDIES:

## 2017-03-20 NOTE — PROGRESS NOTE ADULT - PROBLEM SELECTOR PLAN 1
unstable angina  hep gtt/ASA  Echo  C today  BB/Statin/Nitro  Cardio consult for poss cath in am  Had recent PET scan with DR Piper within last 1 week
unstable angina  hep gtt/ASA  Echo  BB/Statin/Nitro  Cardio consult for poss cath in am  Had recent PET scan with DR Piper within last 1 week

## 2017-03-20 NOTE — DISCHARGE NOTE ADULT - PATIENT PORTAL LINK FT
“You can access the [employer] Patient Portal, offered by [facility], by registering with the following website: [email or web address]”

## 2017-03-20 NOTE — PROGRESS NOTE ADULT - ASSESSMENT
Chest pain- NSTEMI- recurrent chest pain, with risk factors for CAD in a postmenopausal female, HTN, hyperlipidemia, pre diabetes and TIA  Will evaluate her further with left heart cath today.  check 2 D echo with her history of heart failure.  Keep pt NPO  Continue  beta blockers, aspirin and statin for now.    HTN_ continue current medical regimen  Optimally controlled on current medical regimen.    Hyperlipidemia- cotinue statin.    Pre diabetes- check Hgb A1C.    Thank you for allowing me to participate in the care of this patient. Please feel free to contact me with any questions.

## 2017-03-20 NOTE — PROGRESS NOTE ADULT - SUBJECTIVE AND OBJECTIVE BOX
Cardiology NP     Patient is a 76y old  Female who presents with a chief complaint of CP/SOB (19 Mar 2017 02:05)      HPI:    This is a 75 y/o. female with PMH of HTN, dyslipidemia, TIA, moderate MR, h/o pre-DM, presents with chest pressure and shortness of breath. Pt states was recently diagnosed with CHF. Pt states sleeping and woke up with palpitations and chest pain which lead to shortness of breath.       PAST MEDICAL & SURGICAL HISTORY:  Arthritis  DJD (degenerative joint disease)  Legally blind  Cataract: bilateral  HTN (hypertension)  History of       MEDICATIONS  (STANDING):  cloNIDine 0.1milliGRAM(s) Oral three times a day  losartan 25milliGRAM(s) Oral daily  simvastatin 10milliGRAM(s) Oral at bedtime  metoprolol 50milliGRAM(s) Oral two times a day  aspirin  chewable 81milliGRAM(s) Oral daily  heparin  Infusion. Unit(s)/Hr IV Continuous <Continuous>    MEDICATIONS  (PRN):  acetaminophen   Tablet 650milliGRAM(s) Oral every 6 hours PRN For Temp greater than 38 C (100.4 F)  ondansetron Injectable 4milliGRAM(s) IV Push every 6 hours PRN Nausea  heparin  Injectable 5000Unit(s) IV Push every 6 hours PRN For aPTT less than 40      Allergies    Aggrenox (Unknown)      REVIEW OF SYSTEMS: As mentioned in HPI all others Negative     Vital Signs Last 24 Hrs  T(C): 36.1, Max: 36.4 (-19 @ 16:42)  T(F): 97, Max: 97.6 (-19 @ 16:42)  HR: 55 (49 - 55)  BP: 168/70 (119/59 - 168/70)  BP(mean): 82 (82 - 82)  RR: 16 (16 - 20)  SpO2: 99% (99% - 100%)    PHYSICAL EXAM:  NERVOUS SYSTEM:  Alert & Oriented X3, Good concentration  CHEST/LUNG: Clear to auscultation bilaterally; No rales, rhonchi, wheezing, or rubs  HEART: Regular rate and rhythm; No murmurs, rubs, or gallops  ABDOMEN: Soft, Nontender, Nondistended; Bowel sounds present  EXTREMITIES:  2+ Peripheral Pulses, No clubbing, cyanosis, or edema  SKIN: right femoral cath site without bleeding or hematoma,+PP    LABS:                        11.5   5.7   )-----------( 198      ( 20 Mar 2017 05:15 )             35.4     20 Mar 2017 05:15    145    |  108    |  15     ----------------------------<  96     4.2     |  28     |  0.78     Ca    9.0        20 Mar 2017 05:15  Phos  2.7       19 Mar 2017 07:24  Mg     2.1       19 Mar 2017 07:24    TPro  7.3    /  Alb  3.4    /  TBili  0.3    /  DBili  x      /  AST  21     /  ALT  15     /  AlkPhos  98     18 Mar 2017 22:48    PT/INR - ( 18 Mar 2017 22:48 )   PT: 11.9 sec;   INR: 1.08 ratio         PTT - ( 20 Mar 2017 09:27 )  PTT:70.2 sec

## 2017-03-20 NOTE — DISCHARGE NOTE ADULT - HOSPITAL COURSE
76 y.o. female with PMH of HTN, dyslipidemia, TIA, moderate MR, h/o pre-DM, presents with chest pressure and shortness of breath. Pt states was recently diagnosed with CHF. Pt states sleeping and woke up with palpitations and chest pain which lead to shortness of breath. Pt denies any fever, chills, n/v, abd pain, dysuria, diarrhea. Denies burning sensation of the chest. States the chest heaviness is still present and is mild.    Hospital course: Heparin protocol initiated for troponemia and chest pain. EKG did not show evidence of ischemia. PT has some mild to mod risk scores therefore underwent cardiac cath which showed no evidence of active CAD. Non obstructive CAD will be managed with ASA, statin and BP control. To follow up with dr machado as outpt     PMHx: See HPI  PSHx:   Family History: Mother-HTN  Social History: denies x3  ROS: per HPI. All other ROS negative (19 Mar 2017 02:05)              Weight (kg): 81.5 ( @ 11:00)    Vital Signs Last 24 Hrs  T(C): 36.1, Max: 36.4 ( @ 16:42)  T(F): 97, Max: 97.6 ( @ 16:42)  HR: 50 (49 - 55)  BP: 160/60 (119/59 - 168/70)  BP(mean): 82 (82 - 82)  RR: 16 (16 - 20)  SpO2: 99% (99% - 100%)    I&O's Summary    I & Os for current day (as of 20 Mar 2017 16:21)  =============================================        PHYSICAL EXAM:    Constitutional: NAD, awake and alert, well-developed  HEENT: PERR, EOMI, Normal Hearing, MMM  Neck: Soft and supple, No LAD, No JVD  Respiratory: Breath sounds are clear bilaterally, No wheezing, rales or rhonchi  Cardiovascular: S1 and S2, regular rate and rhythm, no Murmurs, gallops or rubs  Gastrointestinal: Bowel Sounds present, soft, nontender, nondistended, no guarding, no rebound  Extremities: No peripheral edema  Vascular: 2+ peripheral pulses  Neurological: A/O x 3, no focal deficits  Musculoskeletal: 5/5 strength b/l upper and lower extremities  Skin: No rashes    MEDICATIONS:  MEDICATIONS  (STANDING):  cloNIDine 0.1milliGRAM(s) Oral three times a day  losartan 25milliGRAM(s) Oral daily  simvastatin 10milliGRAM(s) Oral at bedtime  metoprolol 50milliGRAM(s) Oral two times a day  aspirin  chewable 81milliGRAM(s) Oral daily      LABS: All Labs Reviewed:                        11.5   5.7   )-----------( 198      ( 20 Mar 2017 05:15 )             35.4     20 Mar 2017 05:15    145    |  108    |  15     ----------------------------<  96     4.2     |  28     |  0.78     Ca    9.0        20 Mar 2017 05:15  Phos  2.7       19 Mar 2017 07:24  Mg     2.1       19 Mar 2017 07:24    TPro  7.3    /  Alb  3.4    /  TBili  0.3    /  DBili  x      /  AST  21     /  ALT  15     /  AlkPhos  98     18 Mar 2017 22:48    PT/INR - ( 18 Mar 2017 22:48 )   PT: 11.9 sec;   INR: 1.08 ratio         PTT - ( 20 Mar 2017 09:27 )  PTT:70.2 sec  CARDIAC MARKERS ( 19 Mar 2017 07:24 )  0.154 ng/mL / x     / 56 U/L / x     / x      CARDIAC MARKERS ( 19 Mar 2017 02:55 )  0.313 ng/mL / x     / x     / x     / x      CARDIAC MARKERS ( 18 Mar 2017 22:48 )  0.049 ng/mL / x     / x     / x     / x              Blood Culture:     RADIOLOGY/EKG:  CXR:  No acute disease

## 2017-03-20 NOTE — DISCHARGE NOTE ADULT - MEDICATION SUMMARY - MEDICATIONS TO STOP TAKING
I will STOP taking the medications listed below when I get home from the hospital:    Ceftin 500 mg oral tablet  -- 1 tab(s) by mouth 2 times a day  -- Finish all this medication unless otherwise directed by prescriber.  Medication should be taken with plenty of water.  Take with food or milk.

## 2017-03-27 DIAGNOSIS — E87.6 HYPOKALEMIA: ICD-10-CM

## 2017-03-27 DIAGNOSIS — M19.90 UNSPECIFIED OSTEOARTHRITIS, UNSPECIFIED SITE: ICD-10-CM

## 2017-03-27 DIAGNOSIS — Z86.73 PERSONAL HISTORY OF TRANSIENT ISCHEMIC ATTACK (TIA), AND CEREBRAL INFARCTION WITHOUT RESIDUAL DEFICITS: ICD-10-CM

## 2017-03-27 DIAGNOSIS — I10 ESSENTIAL (PRIMARY) HYPERTENSION: ICD-10-CM

## 2017-03-27 DIAGNOSIS — I25.110 ATHEROSCLEROTIC HEART DISEASE OF NATIVE CORONARY ARTERY WITH UNSTABLE ANGINA PECTORIS: ICD-10-CM

## 2017-03-27 DIAGNOSIS — E78.5 HYPERLIPIDEMIA, UNSPECIFIED: ICD-10-CM

## 2017-03-27 DIAGNOSIS — I24.8 OTHER FORMS OF ACUTE ISCHEMIC HEART DISEASE: ICD-10-CM

## 2017-03-27 DIAGNOSIS — E78.00 PURE HYPERCHOLESTEROLEMIA, UNSPECIFIED: ICD-10-CM

## 2017-03-27 DIAGNOSIS — R73.03 PREDIABETES: ICD-10-CM

## 2017-03-27 DIAGNOSIS — I34.0 NONRHEUMATIC MITRAL (VALVE) INSUFFICIENCY: ICD-10-CM

## 2017-03-27 DIAGNOSIS — E78.2 MIXED HYPERLIPIDEMIA: ICD-10-CM

## 2017-03-27 DIAGNOSIS — R07.9 CHEST PAIN, UNSPECIFIED: ICD-10-CM

## 2017-03-27 DIAGNOSIS — H54.8 LEGAL BLINDNESS, AS DEFINED IN USA: ICD-10-CM

## 2017-04-21 NOTE — H&P ADULT - NSHPSOCIALHISTORY_GEN_ALL_CORE
Received fax from [employer] requesting 1/2 side rails and trapeze to assist with bed mobility and positioning.   Per Libby Monreal the request was ok'd. Fax sent back to [employer].   The patient lives at home with family.  Patient is a non-smoker and non-drinker

## 2017-09-17 ENCOUNTER — EMERGENCY (EMERGENCY)
Facility: HOSPITAL | Age: 77
LOS: 0 days | Discharge: HOME | End: 2017-09-17
Attending: EMERGENCY MEDICINE | Admitting: EMERGENCY MEDICINE
Payer: MEDICARE

## 2017-09-17 VITALS — HEIGHT: 64 IN | WEIGHT: 188.94 LBS

## 2017-09-17 VITALS
HEART RATE: 60 BPM | OXYGEN SATURATION: 100 % | RESPIRATION RATE: 16 BRPM | DIASTOLIC BLOOD PRESSURE: 71 MMHG | TEMPERATURE: 98 F | SYSTOLIC BLOOD PRESSURE: 186 MMHG

## 2017-09-17 DIAGNOSIS — H54.8 LEGAL BLINDNESS, AS DEFINED IN USA: ICD-10-CM

## 2017-09-17 DIAGNOSIS — G89.29 OTHER CHRONIC PAIN: ICD-10-CM

## 2017-09-17 DIAGNOSIS — M17.11 UNILATERAL PRIMARY OSTEOARTHRITIS, RIGHT KNEE: ICD-10-CM

## 2017-09-17 DIAGNOSIS — M25.561 PAIN IN RIGHT KNEE: ICD-10-CM

## 2017-09-17 DIAGNOSIS — I10 ESSENTIAL (PRIMARY) HYPERTENSION: ICD-10-CM

## 2017-09-17 DIAGNOSIS — Z98.89 OTHER SPECIFIED POSTPROCEDURAL STATES: Chronic | ICD-10-CM

## 2017-09-17 PROCEDURE — 99285 EMERGENCY DEPT VISIT HI MDM: CPT

## 2017-09-17 PROCEDURE — 73502 X-RAY EXAM HIP UNI 2-3 VIEWS: CPT | Mod: 26

## 2017-09-17 PROCEDURE — 73552 X-RAY EXAM OF FEMUR 2/>: CPT | Mod: 26

## 2017-09-17 PROCEDURE — 73562 X-RAY EXAM OF KNEE 3: CPT | Mod: 26,RT

## 2017-09-17 RX ORDER — IBUPROFEN 200 MG
400 TABLET ORAL ONCE
Qty: 0 | Refills: 0 | Status: COMPLETED | OUTPATIENT
Start: 2017-09-17 | End: 2017-09-17

## 2017-09-17 RX ORDER — IBUPROFEN 200 MG
600 TABLET ORAL ONCE
Qty: 0 | Refills: 0 | Status: DISCONTINUED | OUTPATIENT
Start: 2017-09-17 | End: 2017-09-17

## 2017-09-17 RX ADMIN — Medication 400 MILLIGRAM(S): at 18:36

## 2017-09-17 NOTE — ED STATDOCS - ATTENDING CONTRIBUTION TO CARE
Dr. Bullard: I have personally performed a face to face bedside history and physical examination of this patient. I have discussed the history, examination, review of systems, assessment and plan of management with the resident. I have reviewed the electronic medical record and amended it to reflect my history, review of systems, physical exam, assessment and plan.

## 2017-09-17 NOTE — ED STATDOCS - MUSCULOSKELETAL, MLM
Right knee no gross deformity, mild swelling/ degenerative changes noted. No obvious effusion on her exam. No tactile warmth. No discoloration. Decreased active ROM due to pain. Right thigh non tender. Right hip +mild tender. RLE distal normal neurovascular exam.

## 2017-09-17 NOTE — ED STATDOCS - OBJECTIVE STATEMENT
78 y/o female with PMHx of HTN presents to the ED c/o right knee pain x 3 days. Pain radiates down the leg and up into the thigh. Gradual onset. Pain is described as severe. Pt poorly able to describe pain quality, "feels like bone on bone". Took Tylenol without relief. Pain is mild at rest and moderate to severe with movement, standing. No fever. +chills today. No N/V. No numbness or tingling to the foot. No known hx of injury. No hx of DM, arthritis. Takes baby ASA when needed. PMD Dr. Pascual at [facility].

## 2017-09-17 NOTE — ED STATDOCS - CONSTITUTIONAL, MLM
normal... Elderly black female in no apparent distress. Alert, no respiratory discomfort, non toxic.

## 2017-09-17 NOTE — ED STATDOCS - ENMT, MLM
Nasal mucosa clear.  Mouth with mildly dry mucosa Oropharynx clear.  Throat has no vesicles, no oropharyngeal exudates and uvula is midline. Neck non tender, supple without pain.

## 2017-09-17 NOTE — ED STATDOCS - MEDICAL DECISION MAKING DETAILS
elderly woman with right knee pain radiating into right hip. No known injury. pain exacerbated by any movement, weight bearing. PE no effusion, no warmth.  Plan: XR, [place], reassess.

## 2017-11-07 NOTE — DISCHARGE NOTE ADULT - NS AS DC FU INST LIST INST
Dear Frandy Workman    Thank you for choosing [facility] ([facility]) for your infusion.  The following information is a summary of our appointment as well as important reminders.        We look forward in seeing you on your next appointment here at [facility].  Please don t hesitate to call us at [phone] to reschedule any of your appointments or to speak with one of the [facility] registered nurses.  It was a pleasure taking care of you today.    Sincerely,    [facility]  [address] [place] [address]  [id number]  Phone:  [phone]     no

## 2019-02-14 NOTE — CONSULT NOTE ADULT - PROBLEM SELECTOR PROBLEM 1
Patient requesting refill of medication.   Medication has been loaded for review.   Please Fax to local pharmacy. Patient will check with pharmacy in 24 to 48 hours   Comments: none     Chest pain, unspecified type

## 2019-08-11 ENCOUNTER — EMERGENCY (EMERGENCY)
Facility: HOSPITAL | Age: 79
LOS: 0 days | Discharge: HOME | End: 2019-08-11
Attending: EMERGENCY MEDICINE
Payer: COMMERCIAL

## 2019-08-11 VITALS
SYSTOLIC BLOOD PRESSURE: 155 MMHG | DIASTOLIC BLOOD PRESSURE: 63 MMHG | RESPIRATION RATE: 18 BRPM | TEMPERATURE: 98 F | OXYGEN SATURATION: 99 % | HEART RATE: 72 BPM

## 2019-08-11 VITALS — WEIGHT: 188.05 LBS

## 2019-08-11 DIAGNOSIS — I10 ESSENTIAL (PRIMARY) HYPERTENSION: ICD-10-CM

## 2019-08-11 DIAGNOSIS — N84.0 POLYP OF CORPUS UTERI: ICD-10-CM

## 2019-08-11 DIAGNOSIS — R31.9 HEMATURIA, UNSPECIFIED: ICD-10-CM

## 2019-08-11 DIAGNOSIS — Z98.89 OTHER SPECIFIED POSTPROCEDURAL STATES: Chronic | ICD-10-CM

## 2019-08-11 DIAGNOSIS — Z79.82 LONG TERM (CURRENT) USE OF ASPIRIN: ICD-10-CM

## 2019-08-11 LAB
ALBUMIN SERPL ELPH-MCNC: 3.2 G/DL — LOW (ref 3.3–5)
ALP SERPL-CCNC: 83 U/L — SIGNIFICANT CHANGE UP (ref 40–120)
ALT FLD-CCNC: 43 U/L — SIGNIFICANT CHANGE UP (ref 12–78)
ANION GAP SERPL CALC-SCNC: 4 MMOL/L — LOW (ref 5–17)
APPEARANCE UR: CLEAR — SIGNIFICANT CHANGE UP
AST SERPL-CCNC: 30 U/L — SIGNIFICANT CHANGE UP (ref 15–37)
BASOPHILS # BLD AUTO: 0 K/UL — SIGNIFICANT CHANGE UP (ref 0–0.2)
BASOPHILS NFR BLD AUTO: 0 % — SIGNIFICANT CHANGE UP (ref 0–2)
BILIRUB SERPL-MCNC: 0.7 MG/DL — SIGNIFICANT CHANGE UP (ref 0.2–1.2)
BILIRUB UR-MCNC: NEGATIVE — SIGNIFICANT CHANGE UP
BUN SERPL-MCNC: 23 MG/DL — SIGNIFICANT CHANGE UP (ref 7–23)
CALCIUM SERPL-MCNC: 9.6 MG/DL — SIGNIFICANT CHANGE UP (ref 8.5–10.1)
CHLORIDE SERPL-SCNC: 106 MMOL/L — SIGNIFICANT CHANGE UP (ref 96–108)
CO2 SERPL-SCNC: 29 MMOL/L — SIGNIFICANT CHANGE UP (ref 22–31)
COLOR SPEC: YELLOW — SIGNIFICANT CHANGE UP
CREAT SERPL-MCNC: 1.07 MG/DL — SIGNIFICANT CHANGE UP (ref 0.5–1.3)
DIFF PNL FLD: ABNORMAL
EOSINOPHIL # BLD AUTO: 0.16 K/UL — SIGNIFICANT CHANGE UP (ref 0–0.5)
EOSINOPHIL NFR BLD AUTO: 2 % — SIGNIFICANT CHANGE UP (ref 0–6)
GLUCOSE SERPL-MCNC: 96 MG/DL — SIGNIFICANT CHANGE UP (ref 70–99)
GLUCOSE UR QL: NEGATIVE MG/DL — SIGNIFICANT CHANGE UP
HCT VFR BLD CALC: 39.9 % — SIGNIFICANT CHANGE UP (ref 34.5–45)
HGB BLD-MCNC: 12.9 G/DL — SIGNIFICANT CHANGE UP (ref 11.5–15.5)
KETONES UR-MCNC: NEGATIVE — SIGNIFICANT CHANGE UP
LEUKOCYTE ESTERASE UR-ACNC: NEGATIVE — SIGNIFICANT CHANGE UP
LIDOCAIN IGE QN: 122 U/L — SIGNIFICANT CHANGE UP (ref 73–393)
LYMPHOCYTES # BLD AUTO: 1.78 K/UL — SIGNIFICANT CHANGE UP (ref 1–3.3)
LYMPHOCYTES # BLD AUTO: 23 % — SIGNIFICANT CHANGE UP (ref 13–44)
MCHC RBC-ENTMCNC: 32.3 GM/DL — SIGNIFICANT CHANGE UP (ref 32–36)
MCHC RBC-ENTMCNC: 32.5 PG — SIGNIFICANT CHANGE UP (ref 27–34)
MCV RBC AUTO: 100.5 FL — HIGH (ref 80–100)
MONOCYTES # BLD AUTO: 0.54 K/UL — SIGNIFICANT CHANGE UP (ref 0–0.9)
MONOCYTES NFR BLD AUTO: 7 % — SIGNIFICANT CHANGE UP (ref 2–14)
NEUTROPHILS # BLD AUTO: 5.28 K/UL — SIGNIFICANT CHANGE UP (ref 1.8–7.4)
NEUTROPHILS NFR BLD AUTO: 68 % — SIGNIFICANT CHANGE UP (ref 43–77)
NITRITE UR-MCNC: NEGATIVE — SIGNIFICANT CHANGE UP
NRBC # BLD: SIGNIFICANT CHANGE UP /100 WBCS (ref 0–0)
PH UR: 7 — SIGNIFICANT CHANGE UP (ref 5–8)
PLATELET # BLD AUTO: 203 K/UL — SIGNIFICANT CHANGE UP (ref 150–400)
POTASSIUM SERPL-MCNC: 4.1 MMOL/L — SIGNIFICANT CHANGE UP (ref 3.5–5.3)
POTASSIUM SERPL-SCNC: 4.1 MMOL/L — SIGNIFICANT CHANGE UP (ref 3.5–5.3)
PROT SERPL-MCNC: 7 GM/DL — SIGNIFICANT CHANGE UP (ref 6–8.3)
PROT UR-MCNC: NEGATIVE MG/DL — SIGNIFICANT CHANGE UP
RBC # BLD: 3.97 M/UL — SIGNIFICANT CHANGE UP (ref 3.8–5.2)
RBC # FLD: 13.7 % — SIGNIFICANT CHANGE UP (ref 10.3–14.5)
SODIUM SERPL-SCNC: 139 MMOL/L — SIGNIFICANT CHANGE UP (ref 135–145)
SP GR SPEC: 1 — LOW (ref 1.01–1.02)
UROBILINOGEN FLD QL: NEGATIVE MG/DL — SIGNIFICANT CHANGE UP
WBC # BLD: 7.76 K/UL — SIGNIFICANT CHANGE UP (ref 3.8–10.5)
WBC # FLD AUTO: 7.76 K/UL — SIGNIFICANT CHANGE UP (ref 3.8–10.5)

## 2019-08-11 PROCEDURE — 80053 COMPREHEN METABOLIC PANEL: CPT

## 2019-08-11 PROCEDURE — 96360 HYDRATION IV INFUSION INIT: CPT | Mod: XU

## 2019-08-11 PROCEDURE — 76856 US EXAM PELVIC COMPLETE: CPT | Mod: 26

## 2019-08-11 PROCEDURE — 85025 COMPLETE CBC W/AUTO DIFF WBC: CPT

## 2019-08-11 PROCEDURE — 74177 CT ABD & PELVIS W/CONTRAST: CPT

## 2019-08-11 PROCEDURE — 99284 EMERGENCY DEPT VISIT MOD MDM: CPT

## 2019-08-11 PROCEDURE — 87086 URINE CULTURE/COLONY COUNT: CPT

## 2019-08-11 PROCEDURE — 81001 URINALYSIS AUTO W/SCOPE: CPT

## 2019-08-11 PROCEDURE — 74177 CT ABD & PELVIS W/CONTRAST: CPT | Mod: 26

## 2019-08-11 PROCEDURE — 99284 EMERGENCY DEPT VISIT MOD MDM: CPT | Mod: 25

## 2019-08-11 PROCEDURE — 76856 US EXAM PELVIC COMPLETE: CPT

## 2019-08-11 PROCEDURE — 83690 ASSAY OF LIPASE: CPT

## 2019-08-11 PROCEDURE — 36415 COLL VENOUS BLD VENIPUNCTURE: CPT

## 2019-08-11 PROCEDURE — 99053 MED SERV 10PM-8AM 24 HR FAC: CPT

## 2019-08-11 RX ORDER — SODIUM CHLORIDE 9 MG/ML
1000 INJECTION INTRAMUSCULAR; INTRAVENOUS; SUBCUTANEOUS ONCE
Refills: 0 | Status: COMPLETED | OUTPATIENT
Start: 2019-08-11 | End: 2019-08-11

## 2019-08-11 RX ORDER — ACETAMINOPHEN 500 MG
650 TABLET ORAL ONCE
Refills: 0 | Status: COMPLETED | OUTPATIENT
Start: 2019-08-11 | End: 2019-08-11

## 2019-08-11 RX ADMIN — Medication 650 MILLIGRAM(S): at 07:12

## 2019-08-11 RX ADMIN — SODIUM CHLORIDE 1000 MILLILITER(S): 9 INJECTION INTRAMUSCULAR; INTRAVENOUS; SUBCUTANEOUS at 08:11

## 2019-08-11 RX ADMIN — SODIUM CHLORIDE 1000 MILLILITER(S): 9 INJECTION INTRAMUSCULAR; INTRAVENOUS; SUBCUTANEOUS at 07:11

## 2019-08-11 NOTE — ED ADULT TRIAGE NOTE - CHIEF COMPLAINT QUOTE
pt c/o abdominal pain, h/a. denies n/v/d, fevers/chills. pt c/o abdominal pain, h/a, states her eyes are bothering her. denies n/v/d, fevers/chills.

## 2019-08-11 NOTE — ED ADULT NURSE NOTE - NSIMPLEMENTINTERV_GEN_ALL_ED
Implemented All Fall Risk Interventions:  [place] to call system. Call bell, personal items and telephone within reach. Instruct patient to call for assistance. Room bathroom lighting operational. Non-slip footwear when patient is off stretcher. Physically safe environment: no spills, clutter or unnecessary equipment. Stretcher in lowest position, wheels locked, appropriate side rails in place. Provide visual cue, wrist band, yellow gown, etc. Monitor gait and stability. Monitor for mental status changes and reorient to person, place, and time. Review medications for side effects contributing to fall risk. Reinforce activity limits and safety measures with patient and family.

## 2019-08-11 NOTE — ED ADULT NURSE NOTE - OBJECTIVE STATEMENT
Pt presented to ED c/o abd pain, headache, dysuria x 2 days. Does not have a menstrual period but endorses vaginal bleeding. Not passing clots. Denies N/V/D, fever, chills. NPO. Pending urine and radiological exams. Safety/fall precautions.

## 2019-08-11 NOTE — ED PROVIDER NOTE - OBJECTIVE STATEMENT
80 y/o female in ED c/o blood in urine and possibly from vaginal region x 3 days.   no meds taken for symptoms.    pt denies any fever, HA, cp, sob, n/v/d.   tolerating PO.   denies any trauma

## 2019-08-11 NOTE — ED PROVIDER NOTE - PROGRESS NOTE DETAILS
Bleeding stopped. US with uterine polyp -- will require Gyn follow-up.  She is followed at [facility] and will follow-up there. Will provide copies of imaging, labs

## 2019-08-11 NOTE — ED PROVIDER NOTE - NSFOLLOWUPINSTRUCTIONS_ED_ALL_ED_FT
Follow-up with your gynecologist at the [facility] and show them the copy of your ultrasound report  Return to the ED with any recurrent/heavy bleeding, dizziness

## 2019-08-11 NOTE — ED PROVIDER NOTE - NSFOLLOWUPCLINICS_GEN_ALL_ED_FT
[facility]  Family Medicine  [address] [place] [address]  Phone: [phone]  Fax:   Follow Up Time:

## 2019-08-12 LAB
CULTURE RESULTS: SIGNIFICANT CHANGE UP
SPECIMEN SOURCE: SIGNIFICANT CHANGE UP

## 2019-09-25 ENCOUNTER — INPATIENT (INPATIENT)
Facility: HOSPITAL | Age: 79
LOS: 2 days | Discharge: HOME | DRG: 313 | End: 2019-09-28
Attending: INTERNAL MEDICINE | Admitting: INTERNAL MEDICINE
Payer: COMMERCIAL

## 2019-09-25 VITALS — HEIGHT: 64 IN | WEIGHT: 182.98 LBS

## 2019-09-25 DIAGNOSIS — Z98.89 OTHER SPECIFIED POSTPROCEDURAL STATES: Chronic | ICD-10-CM

## 2019-09-25 LAB
ALBUMIN SERPL ELPH-MCNC: 3.1 G/DL — LOW (ref 3.3–5)
ALP SERPL-CCNC: 103 U/L — SIGNIFICANT CHANGE UP (ref 40–120)
ALT FLD-CCNC: 26 U/L — SIGNIFICANT CHANGE UP (ref 12–78)
ANION GAP SERPL CALC-SCNC: 8 MMOL/L — SIGNIFICANT CHANGE UP (ref 5–17)
AST SERPL-CCNC: 27 U/L — SIGNIFICANT CHANGE UP (ref 15–37)
BASOPHILS # BLD AUTO: 0.02 K/UL — SIGNIFICANT CHANGE UP (ref 0–0.2)
BASOPHILS NFR BLD AUTO: 0.2 % — SIGNIFICANT CHANGE UP (ref 0–2)
BILIRUB SERPL-MCNC: 0.4 MG/DL — SIGNIFICANT CHANGE UP (ref 0.2–1.2)
BUN SERPL-MCNC: 21 MG/DL — SIGNIFICANT CHANGE UP (ref 7–23)
CALCIUM SERPL-MCNC: 8.8 MG/DL — SIGNIFICANT CHANGE UP (ref 8.5–10.1)
CHLORIDE SERPL-SCNC: 109 MMOL/L — HIGH (ref 96–108)
CO2 SERPL-SCNC: 24 MMOL/L — SIGNIFICANT CHANGE UP (ref 22–31)
CREAT SERPL-MCNC: 1.16 MG/DL — SIGNIFICANT CHANGE UP (ref 0.5–1.3)
EOSINOPHIL # BLD AUTO: 0.07 K/UL — SIGNIFICANT CHANGE UP (ref 0–0.5)
EOSINOPHIL NFR BLD AUTO: 0.7 % — SIGNIFICANT CHANGE UP (ref 0–6)
GLUCOSE SERPL-MCNC: 99 MG/DL — SIGNIFICANT CHANGE UP (ref 70–99)
HCT VFR BLD CALC: 35.7 % — SIGNIFICANT CHANGE UP (ref 34.5–45)
HGB BLD-MCNC: 11.7 G/DL — SIGNIFICANT CHANGE UP (ref 11.5–15.5)
IMM GRANULOCYTES NFR BLD AUTO: 0.5 % — SIGNIFICANT CHANGE UP (ref 0–1.5)
LYMPHOCYTES # BLD AUTO: 2.13 K/UL — SIGNIFICANT CHANGE UP (ref 1–3.3)
LYMPHOCYTES # BLD AUTO: 22.5 % — SIGNIFICANT CHANGE UP (ref 13–44)
MCHC RBC-ENTMCNC: 32.8 GM/DL — SIGNIFICANT CHANGE UP (ref 32–36)
MCHC RBC-ENTMCNC: 32.9 PG — SIGNIFICANT CHANGE UP (ref 27–34)
MCV RBC AUTO: 100.3 FL — HIGH (ref 80–100)
MONOCYTES # BLD AUTO: 0.58 K/UL — SIGNIFICANT CHANGE UP (ref 0–0.9)
MONOCYTES NFR BLD AUTO: 6.1 % — SIGNIFICANT CHANGE UP (ref 2–14)
NEUTROPHILS # BLD AUTO: 6.63 K/UL — SIGNIFICANT CHANGE UP (ref 1.8–7.4)
NEUTROPHILS NFR BLD AUTO: 70 % — SIGNIFICANT CHANGE UP (ref 43–77)
PLATELET # BLD AUTO: 223 K/UL — SIGNIFICANT CHANGE UP (ref 150–400)
POTASSIUM SERPL-MCNC: 3.4 MMOL/L — LOW (ref 3.5–5.3)
POTASSIUM SERPL-SCNC: 3.4 MMOL/L — LOW (ref 3.5–5.3)
PROT SERPL-MCNC: 6.8 GM/DL — SIGNIFICANT CHANGE UP (ref 6–8.3)
RBC # BLD: 3.56 M/UL — LOW (ref 3.8–5.2)
RBC # FLD: 13.3 % — SIGNIFICANT CHANGE UP (ref 10.3–14.5)
SODIUM SERPL-SCNC: 141 MMOL/L — SIGNIFICANT CHANGE UP (ref 135–145)
WBC # BLD: 9.48 K/UL — SIGNIFICANT CHANGE UP (ref 3.8–10.5)
WBC # FLD AUTO: 9.48 K/UL — SIGNIFICANT CHANGE UP (ref 3.8–10.5)

## 2019-09-25 PROCEDURE — 93010 ELECTROCARDIOGRAM REPORT: CPT | Mod: 76

## 2019-09-25 PROCEDURE — 71045 X-RAY EXAM CHEST 1 VIEW: CPT | Mod: 26

## 2019-09-25 RX ORDER — ASPIRIN/CALCIUM CARB/MAGNESIUM 324 MG
324 TABLET ORAL ONCE
Refills: 0 | Status: COMPLETED | OUTPATIENT
Start: 2019-09-25 | End: 2019-09-25

## 2019-09-25 RX ADMIN — Medication 324 MILLIGRAM(S): at 23:03

## 2019-09-25 NOTE — ED PROVIDER NOTE - ENMT, MLM
Airway patent, Nasal mucosa clear. Mouth with normal mucosa. Throat has no vesicles, no oropharyngeal exudates and uvula is midline. +missing teeth.

## 2019-09-25 NOTE — ED ADULT NURSE NOTE - NSIMPLEMENTINTERV_GEN_ALL_ED
Implemented All Fall Risk Interventions:  [place] to call system. Call bell, personal items and telephone within reach. Instruct patient to call for assistance. Room bathroom lighting operational. Non-slip footwear when patient is off stretcher. Physically safe environment: no spills, clutter or unnecessary equipment. Stretcher in lowest position, wheels locked, appropriate side rails in place. Provide visual cue, wrist band, yellow gown, etc. Monitor gait and stability. Monitor for mental status changes and reorient to person, place, and time. Review medications for side effects contributing to fall risk. Reinforce activity limits and safety measures with patient and family.

## 2019-09-25 NOTE — ED PROVIDER NOTE - NS_ ATTENDINGSCRIBEDETAILS _ED_A_ED_FT
Dayana Briceño MD: The history, relevant review of systems, past medical and surgical history, medical decision making, and physical examination was documented by the scribe in my presence and I attest to the accuracy of the documentation.

## 2019-09-25 NOTE — ED ADULT NURSE NOTE - CHPI ED NUR SYMPTOMS NEG
no back pain/no diaphoresis/no nausea/no dizziness/no vomiting/no chills/no fever/no shortness of breath/no syncope

## 2019-09-25 NOTE — ED ADULT NURSE NOTE - OBJECTIVE STATEMENT
pt c/o sudden onset midsternal chest pain. Denies SOB. Follows with cardiology for hx chest pain. Pt on the monitor, will ctm

## 2019-09-25 NOTE — ED PROVIDER NOTE - OBJECTIVE STATEMENT
80 y/o female with a PMHx of arthritis, cataracts, DJD, HTN, legally blind, presents to the ED c/o sudden onset of CP starting 9pm today. Pt notes pain feels like a heaviness. Nonradiating. Denies n/v/d, SOB, diaphoresis, fever, chills, LE swelling. Did not take ASA today. Nonsmoker. No recent travel. No other complaints at this time. Cardio: Dr. Piper.

## 2019-09-25 NOTE — ED PROVIDER NOTE - PROGRESS NOTE DETAILS
pt with conitnued chest pain despite 2 negative troponins, case dw Dr. Stephenson tba to obs OLE Stark DO

## 2019-09-26 DIAGNOSIS — R07.9 CHEST PAIN, UNSPECIFIED: ICD-10-CM

## 2019-09-26 LAB
CRP SERPL-MCNC: 0.82 MG/DL — HIGH (ref 0–0.4)
D DIMER BLD IA.RAPID-MCNC: 918 NG/ML DDU — HIGH
ERYTHROCYTE [SEDIMENTATION RATE] IN BLOOD: 40 MM/HR — HIGH (ref 0–20)
TROPONIN I SERPL-MCNC: <0.015 NG/ML — SIGNIFICANT CHANGE UP (ref 0.01–0.04)
TROPONIN I SERPL-MCNC: <0.015 NG/ML — SIGNIFICANT CHANGE UP (ref 0.01–0.04)

## 2019-09-26 PROCEDURE — 86140 C-REACTIVE PROTEIN: CPT

## 2019-09-26 PROCEDURE — 93306 TTE W/DOPPLER COMPLETE: CPT | Mod: 26

## 2019-09-26 PROCEDURE — 93005 ELECTROCARDIOGRAM TRACING: CPT

## 2019-09-26 PROCEDURE — 36415 COLL VENOUS BLD VENIPUNCTURE: CPT

## 2019-09-26 PROCEDURE — A9540: CPT

## 2019-09-26 PROCEDURE — 93306 TTE W/DOPPLER COMPLETE: CPT

## 2019-09-26 PROCEDURE — 85379 FIBRIN DEGRADATION QUANT: CPT

## 2019-09-26 PROCEDURE — 78582 LUNG VENTILAT&PERFUS IMAGING: CPT

## 2019-09-26 PROCEDURE — A9567: CPT

## 2019-09-26 PROCEDURE — 85652 RBC SED RATE AUTOMATED: CPT

## 2019-09-26 PROCEDURE — 80061 LIPID PANEL: CPT

## 2019-09-26 PROCEDURE — 78582 LUNG VENTILAT&PERFUS IMAGING: CPT | Mod: 26

## 2019-09-26 PROCEDURE — 83036 HEMOGLOBIN GLYCOSYLATED A1C: CPT

## 2019-09-26 RX ORDER — PANTOPRAZOLE SODIUM 20 MG/1
40 TABLET, DELAYED RELEASE ORAL
Refills: 0 | Status: DISCONTINUED | OUTPATIENT
Start: 2019-09-26 | End: 2019-09-28

## 2019-09-26 RX ORDER — SODIUM CHLORIDE 9 MG/ML
1000 INJECTION INTRAMUSCULAR; INTRAVENOUS; SUBCUTANEOUS
Refills: 0 | Status: DISCONTINUED | OUTPATIENT
Start: 2019-09-26 | End: 2019-09-26

## 2019-09-26 RX ORDER — ASPIRIN/CALCIUM CARB/MAGNESIUM 324 MG
81 TABLET ORAL DAILY
Refills: 0 | Status: DISCONTINUED | OUTPATIENT
Start: 2019-09-26 | End: 2019-09-28

## 2019-09-26 RX ORDER — SENNA PLUS 8.6 MG/1
2 TABLET ORAL AT BEDTIME
Refills: 0 | Status: DISCONTINUED | OUTPATIENT
Start: 2019-09-26 | End: 2019-09-28

## 2019-09-26 RX ORDER — ENOXAPARIN SODIUM 100 MG/ML
40 INJECTION SUBCUTANEOUS DAILY
Refills: 0 | Status: DISCONTINUED | OUTPATIENT
Start: 2019-09-26 | End: 2019-09-26

## 2019-09-26 RX ORDER — SIMVASTATIN 20 MG/1
10 TABLET, FILM COATED ORAL AT BEDTIME
Refills: 0 | Status: DISCONTINUED | OUTPATIENT
Start: 2019-09-26 | End: 2019-09-28

## 2019-09-26 RX ORDER — METOPROLOL TARTRATE 50 MG
50 TABLET ORAL DAILY
Refills: 0 | Status: DISCONTINUED | OUTPATIENT
Start: 2019-09-26 | End: 2019-09-28

## 2019-09-26 RX ORDER — ACETAMINOPHEN 500 MG
650 TABLET ORAL EVERY 6 HOURS
Refills: 0 | Status: DISCONTINUED | OUTPATIENT
Start: 2019-09-26 | End: 2019-09-27

## 2019-09-26 RX ORDER — NITROGLYCERIN 6.5 MG
0.4 CAPSULE, EXTENDED RELEASE ORAL
Refills: 0 | Status: DISCONTINUED | OUTPATIENT
Start: 2019-09-26 | End: 2019-09-27

## 2019-09-26 RX ORDER — ONDANSETRON 8 MG/1
4 TABLET, FILM COATED ORAL EVERY 6 HOURS
Refills: 0 | Status: DISCONTINUED | OUTPATIENT
Start: 2019-09-26 | End: 2019-09-28

## 2019-09-26 RX ORDER — ENOXAPARIN SODIUM 100 MG/ML
40 INJECTION SUBCUTANEOUS ONCE
Refills: 0 | Status: COMPLETED | OUTPATIENT
Start: 2019-09-26 | End: 2019-09-26

## 2019-09-26 RX ORDER — ASPIRIN/CALCIUM CARB/MAGNESIUM 324 MG
1 TABLET ORAL
Qty: 0 | Refills: 0 | DISCHARGE

## 2019-09-26 RX ORDER — IBUPROFEN 200 MG
400 TABLET ORAL THREE TIMES A DAY
Refills: 0 | Status: COMPLETED | OUTPATIENT
Start: 2019-09-26 | End: 2019-09-28

## 2019-09-26 RX ORDER — ENOXAPARIN SODIUM 100 MG/ML
40 INJECTION SUBCUTANEOUS DAILY
Refills: 0 | Status: DISCONTINUED | OUTPATIENT
Start: 2019-09-26 | End: 2019-09-28

## 2019-09-26 RX ORDER — HYDROCHLOROTHIAZIDE 25 MG
0 TABLET ORAL
Qty: 0 | Refills: 0 | DISCHARGE

## 2019-09-26 RX ORDER — ENOXAPARIN SODIUM 100 MG/ML
80 INJECTION SUBCUTANEOUS EVERY 12 HOURS
Refills: 0 | Status: DISCONTINUED | OUTPATIENT
Start: 2019-09-26 | End: 2019-09-26

## 2019-09-26 RX ORDER — POTASSIUM CHLORIDE 20 MEQ
40 PACKET (EA) ORAL ONCE
Refills: 0 | Status: COMPLETED | OUTPATIENT
Start: 2019-09-26 | End: 2019-09-26

## 2019-09-26 RX ORDER — LOSARTAN POTASSIUM 100 MG/1
100 TABLET, FILM COATED ORAL DAILY
Refills: 0 | Status: DISCONTINUED | OUTPATIENT
Start: 2019-09-26 | End: 2019-09-28

## 2019-09-26 RX ORDER — LOSARTAN POTASSIUM 100 MG/1
1 TABLET, FILM COATED ORAL
Qty: 0 | Refills: 0 | DISCHARGE

## 2019-09-26 RX ORDER — ACETAMINOPHEN 500 MG
650 TABLET ORAL EVERY 4 HOURS
Refills: 0 | Status: DISCONTINUED | OUTPATIENT
Start: 2019-09-26 | End: 2019-09-27

## 2019-09-26 RX ORDER — INFLUENZA VIRUS VACCINE 15; 15; 15; 15 UG/.5ML; UG/.5ML; UG/.5ML; UG/.5ML
0.5 SUSPENSION INTRAMUSCULAR ONCE
Refills: 0 | Status: COMPLETED | OUTPATIENT
Start: 2019-09-26 | End: 2019-09-26

## 2019-09-26 RX ADMIN — Medication 400 MILLIGRAM(S): at 14:36

## 2019-09-26 RX ADMIN — Medication 650 MILLIGRAM(S): at 06:02

## 2019-09-26 RX ADMIN — LOSARTAN POTASSIUM 100 MILLIGRAM(S): 100 TABLET, FILM COATED ORAL at 14:36

## 2019-09-26 RX ADMIN — ENOXAPARIN SODIUM 40 MILLIGRAM(S): 100 INJECTION SUBCUTANEOUS at 14:36

## 2019-09-26 RX ADMIN — SODIUM CHLORIDE 50 MILLILITER(S): 9 INJECTION INTRAMUSCULAR; INTRAVENOUS; SUBCUTANEOUS at 14:39

## 2019-09-26 RX ADMIN — Medication 400 MILLIGRAM(S): at 14:40

## 2019-09-26 RX ADMIN — Medication 650 MILLIGRAM(S): at 07:30

## 2019-09-26 RX ADMIN — Medication 50 MILLIGRAM(S): at 10:16

## 2019-09-26 RX ADMIN — SIMVASTATIN 10 MILLIGRAM(S): 20 TABLET, FILM COATED ORAL at 22:31

## 2019-09-26 RX ADMIN — Medication 0.4 MILLIGRAM(S): at 10:17

## 2019-09-26 RX ADMIN — PANTOPRAZOLE SODIUM 40 MILLIGRAM(S): 20 TABLET, DELAYED RELEASE ORAL at 10:16

## 2019-09-26 RX ADMIN — ENOXAPARIN SODIUM 40 MILLIGRAM(S): 100 INJECTION SUBCUTANEOUS at 15:57

## 2019-09-26 RX ADMIN — Medication 40 MILLIEQUIVALENT(S): at 10:16

## 2019-09-26 RX ADMIN — Medication 400 MILLIGRAM(S): at 22:31

## 2019-09-26 NOTE — H&P ADULT - ASSESSMENT
80 yo F with pmh TIA, HFpEF, preDM, HTN, hyperlipidemia, legally blind, p/w complaints of chest pain since 9pm on 9/25. Describes pain as heaviness without any associated radiation, dyspnea, cough, fevers, chills, hemoptysis, palpitations or near syncope. No recent travel or sick contacts. No LE swelling.  No recent surgeries. Last cardiac cath performed 3/17 with evidence of non obstructive cad. EF 60%    ED course: given  x 1. 3 sets of troponins negative. EKG NSR with no dynamic changes. VSS      #Atypical chest pain  -3 sets trops negative  -obtain CXR and r/o infiltrate  -obtain d dimer; low PERC score  -esr/crp r/o other atypical causes of chest pain  -Ntiro PRN.   -Cont BB, ASA, statin  -Obtain 2D echo and eval for any wma  -cardio consult  -replete potassium  -resume all home meds    #Abnl pelvic sono  -performed 8/2019  -pt to follow up with GYN for biopsy outpt   dvt px  IMPROVE VTE Individual Risk Assessment    RISK                                                                Points    [  ] Previous VTE                                                  3    [  ] Thrombophilia                                               2    [  ] Lower limb paralysis                                      2        (unable to hold up >15 seconds)      [  ] Current Cancer                                              2         (within 6 months)    [  ] Immobilization > 24 hrs                                1    [  ] ICU/CCU stay > 24 hours                              1    [ x ] Age > 60                                                      1    IMPROVE VTE Score ______1___    IMPROVE Score 0-1: Low Risk, No VTE prophylaxis required for most patients, encourage ambulation.   IMPROVE Score 2-3: At risk, pharmacologic VTE prophylaxis is indicated for most patients (in the absence of a contraindication)  IMPROVE Score > or = 4: High Risk, pharmacologic VTE prophylaxis is indicated for most patients (in the absence of a contraindication)

## 2019-09-26 NOTE — H&P ADULT - NSHPLABSRESULTS_GEN_ALL_CORE
LABS: All Labs Reviewed:                        11.7   9.48  )-----------( 223      ( 25 Sep 2019 22:59 )             35.7     09-25    141  |  109<H>  |  21  ----------------------------<  99  3.4<L>   |  24  |  1.16    Ca    8.8      25 Sep 2019 22:59    TPro  6.8  /  Alb  3.1<L>  /  TBili  0.4  /  DBili  x   /  AST  27  /  ALT  26  /  AlkPhos  103  09-25      CARDIAC MARKERS ( 26 Sep 2019 05:19 )  <0.015 ng/mL / x     / x     / x     / x      CARDIAC MARKERS ( 26 Sep 2019 01:23 )  <0.015 ng/mL / x     / x     / x     / x      CARDIAC MARKERS ( 25 Sep 2019 22:59 )  <0.015 ng/mL / x     / x     / x     / x              Blood Culture:

## 2019-09-26 NOTE — ED ADULT NURSE REASSESSMENT NOTE - NS ED NURSE REASSESS COMMENT FT1
Left a voicemail for the hospitalist regarding the elevated d-Dimer, will f/u as needed. Patient currently resting comfortably, asymptomatic at this time.

## 2019-09-26 NOTE — CONSULT NOTE ADULT - SUBJECTIVE AND OBJECTIVE BOX
Patient is a 79y old  Female who presents with a chief complaint of cp.       HPI:  80 yo F with pmh TIA, HFpEF, preDM, HTN, hyperlipidemia, legally blind, p/w complaints of chest pain since 9pm on . Describes pain as heaviness without any associated radiation, dyspnea, cough, fevers, chills, hemoptysis, palpitations or near syncope. No recent travel or sick contacts. No LE swelling.  No recent surgeries. Last cardiac cath performed 3/17 with evidence of non obstructive cad. EF 60%    ED course: given  x 1. 3 sets of troponins negative. EKG NSR with no dynamic changes. VSS    Pt currently denies any CP or pressure .       Social: no toxic habits  Fhx: mother: HTN  Shx:        PAST MEDICAL & SURGICAL HISTORY:  Arthritis  DJD (degenerative joint disease)  Legally blind  Cataract: bilateral  HTN (hypertension)  History of       MEDICATIONS  (STANDING):  aspirin  chewable 81 milliGRAM(s) Oral daily  enoxaparin Injectable 80 milliGRAM(s) SubCutaneous every 12 hours  ibuprofen  Tablet. 400 milliGRAM(s) Oral three times a day  influenza   Vaccine 0.5 milliLiter(s) IntraMuscular once  losartan 100 milliGRAM(s) Oral daily  metoprolol succinate ER 50 milliGRAM(s) Oral daily  pantoprazole    Tablet 40 milliGRAM(s) Oral before breakfast  simvastatin 10 milliGRAM(s) Oral at bedtime  sodium chloride 0.9%. 1000 milliLiter(s) (50 mL/Hr) IV Continuous <Continuous>    MEDICATIONS  (PRN):  acetaminophen   Tablet .. 650 milliGRAM(s) Oral every 6 hours PRN Mild Pain (1 - 3)  acetaminophen   Tablet .. 650 milliGRAM(s) Oral every 4 hours PRN Temp greater or equal to 38C (100.4F), Mild Pain (1 - 3)  nitroglycerin     SubLingual 0.4 milliGRAM(s) SubLingual every 5 minutes PRN Chest Pain  ondansetron Injectable 4 milliGRAM(s) IV Push every 6 hours PRN Nausea  senna 2 Tablet(s) Oral at bedtime PRN Constipation      FAMILY HISTORY:  No pertinent family history in first degree relatives    REVIEW OF SYSTEMS:  CONSTITUTIONAL:    No fatigue, malaise, lethargy.  No fever or chills.     RESPIRATORY:  No cough.  No wheeze.  No hemoptysis.  No shortness of breath.  CARDIOVASCULAR:  No chest pains.  No palpitations. No shortness of breath, No orthopnea or PND.  GASTROINTESTINAL:  No abdominal pain.  No nausea or vomiting.    GENITOURINARY:    No hematuria.    MUSCULOSKELETAL:  No musculoskeletal pain.  No joint swelling.  No arthritis.  NEUROLOGICAL:  No tingling or numbness or weakness.  PSYCHIATRIC:  No confusion        Vital Signs Last 24 Hrs  T(C): 36.6 (26 Sep 2019 16:20), Max: 36.7 (26 Sep 2019 10:13)  T(F): 97.9 (26 Sep 2019 16:20), Max: 98 (26 Sep 2019 10:13)  HR: 64 (26 Sep 2019 16:20) (56 - 74)  BP: 144/51 (26 Sep 2019 16:20) (144/48 - 160/65)  BP(mean): --  RR: 16 (26 Sep 2019 16:20) (16 - 17)  SpO2: 97% (26 Sep 2019 16:20) (97% - 100%)    PHYSICAL EXAM-    Constitutional: no acute distress     Head: Head is normocephalic and atraumatic.      Neck: No JVD.     Cardiovascular: Regular rate and rhythm without S3, S4. No murmurs or rubs are appreciated.      Respiratory: Breath sounds are normal. No rales. No wheezing.    Abdomen: Soft, nontender, nondistended with positive bowel sounds.      Extremity: No tenderness. No  pitting edema     Neurologic: The patient is alert and oriented.      Skin: No rash, no obvious lesions noted.      Psychiatric: The patient appears to be emotionally stable.      INTERPRETATION OF TELEMETRY: sinus rythm    ECG: Sinus rythm , l axis, no ST T changes.     I&O's Detail      LABS:                        11.7   9.48  )-----------( 223      ( 25 Sep 2019 22:59 )             35.7     09-25    141  |  109<H>  |  21  ----------------------------<  99  3.4<L>   |  24  |  1.16    Ca    8.8      25 Sep 2019 22:59    TPro  6.8  /  Alb  3.1<L>  /  TBili  0.4  /  DBili  x   /  AST  27  /  ALT  26  /  AlkPhos  103  09-    CARDIAC MARKERS ( 26 Sep 2019 05:19 )  <0.015 ng/mL / x     / x     / x     / x      CARDIAC MARKERS ( 26 Sep 2019 01:23 )  <0.015 ng/mL / x     / x     / x     / x      CARDIAC MARKERS ( 25 Sep 2019 22:59 )  <0.015 ng/mL / x     / x     / x     / x              I&O's Summary    BNP  RADIOLOGY & ADDITIONAL STUDIES:  < from: Xray Chest 1 View- PORTABLE-Urgent (19 @ 23:55) >    EXAM:  XR CHEST PORTABLE URGENT 1V                            PROCEDURE DATE:  2019          INTERPRETATION:  Clinical indication:  cp chest pain    Technique: XR CHEST URGENT portable AP    Comparison:3/18/2017.    Findings:  Lines: None    Heart/Mediastinum/Lungs: The heart size is normal.The lungs are   clear.There are no pleural effusions.    Impression:    Clear lungs.                SANDRA ROBB M.D.    < end of copied text >

## 2019-09-26 NOTE — CONSULT NOTE ADULT - ASSESSMENT
Chest pain - atypical in nature.  in the setting of elevated BP.  so far cardiac enzymes and EKG did not reveal any ischemia.  Will monitor symptoms with treatement of HTN.   AMbulate the pt to reasses symptoms.   She had a stress test 8/2019 which did not reveal any ischemia.     HTN urgency- BP mildy elevated.  continue current meds.  DV IVF as this can rise the BP further     Other medical issues- Management per primary team.   Thank you for allowing me to participate in the care of this patient. Please feel free to contact me with any questions.

## 2019-09-26 NOTE — ED ADULT NURSE REASSESSMENT NOTE - NS ED NURSE REASSESS COMMENT FT1
Patient resting comfortably in bed. VSS, denies pain/discomfort at this time. No s/s of distress present. Plan of care discussed. Hand-off report to GWENDOLYN Gallardo, awaiting transport to . Safety & comfort measures in place, purposeful proactive rounding on my time.

## 2019-09-26 NOTE — H&P ADULT - HISTORY OF PRESENT ILLNESS
78 yo F with pmh TIA, HFpEF, preDM, HTN, hyperlipidemia, legally blind, p/w complaints of chest pain since 9pm on . Describes pain as heaviness without any associated radiation, dyspnea, cough, fevers, chills, hemoptysis, palpitations or near syncope. No recent travel or sick contacts. No LE swelling.  No recent surgeries. Last cardiac cath performed 3/17 with evidence of non obstructive cad. EF 60%    ED course: given  x 1. 3 sets of troponins negative. EKG NSR with no dynamic changes. VSS      Social: no toxic habits  Fhx: mother: HTN  Shx:

## 2019-09-26 NOTE — H&P ADULT - NSHPREVIEWOFSYSTEMS_GEN_ALL_CORE
REVIEW OF SYSTEMS:    CONSTITUTIONAL: No weakness, fevers or chills  EYES/ENT: No visual changes;  No vertigo or throat pain   NECK: No pain or stiffness  RESPIRATORY: No cough, wheezing, hemoptysis; No shortness of breath  CARDIOVASCULAR: +CP  GASTROINTESTINAL: No abdominal or epigastric pain. No nausea, vomiting, or hematemesis; No diarrhea or constipation. No melena or hematochezia.  GENITOURINARY: No dysuria, frequency or hematuria  NEUROLOGICAL: No numbness or weakness  SKIN: No itching, burning, rashes, or lesions   All other review of systems is negative unless indicated above

## 2019-09-26 NOTE — ED ADULT NURSE REASSESSMENT NOTE - NS ED NURSE REASSESS COMMENT FT1
pt endorses partial relief of chest pain. pt denies SOB. resting comfortably in stretcher at this time. daughter at bedside

## 2019-09-26 NOTE — H&P ADULT - NSHPPHYSICALEXAM_GEN_ALL_CORE
ICU Vital Signs Last 24 Hrs  T(C): 36.6 (26 Sep 2019 06:58), Max: 36.6 (26 Sep 2019 06:58)  T(F): 97.8 (26 Sep 2019 06:58), Max: 97.8 (26 Sep 2019 06:58)  HR: 56 (26 Sep 2019 06:58) (56 - 74)  BP: 149/58 (26 Sep 2019 06:58) (149/58 - 160/65)  BP(mean): --  ABP: --  ABP(mean): --  RR: 16 (26 Sep 2019 06:58) (16 - 17)  SpO2: 100% (26 Sep 2019 06:58) (99% - 100%)      PHYSICAL EXAM:    Constitutional: NAD, awake and alert, well-developed  HEENT: PERR, EOMI, Normal Hearing, MMM  Neck: Soft and supple, No LAD, No JVD  Respiratory: Breath sounds are clear bilaterally, No wheezing, rales or rhonchi  Cardiovascular: S1 and S2, regular rate and rhythm, no Murmurs, gallops or rubs  Gastrointestinal: Bowel Sounds present, soft, nontender, nondistended, no guarding, no rebound  Extremities: No peripheral edema  Vascular: 2+ peripheral pulses  Neurological: A/O x 3, no focal deficits  Musculoskeletal: 5/5 strength b/l upper and lower extremities  Skin: No rashes

## 2019-09-27 LAB
CHOLEST SERPL-MCNC: 184 MG/DL — SIGNIFICANT CHANGE UP (ref 10–199)
HBA1C BLD-MCNC: 5.6 % — SIGNIFICANT CHANGE UP (ref 4–5.6)
HDLC SERPL-MCNC: 60 MG/DL — SIGNIFICANT CHANGE UP
LIPID PNL WITH DIRECT LDL SERPL: 114 MG/DL — HIGH
TOTAL CHOLESTEROL/HDL RATIO MEASUREMENT: 3.1 RATIO — LOW (ref 3.3–7.1)
TRIGL SERPL-MCNC: 52 MG/DL — SIGNIFICANT CHANGE UP (ref 10–149)

## 2019-09-27 PROCEDURE — 93010 ELECTROCARDIOGRAM REPORT: CPT

## 2019-09-27 RX ORDER — HYDROCHLOROTHIAZIDE 25 MG
12.5 TABLET ORAL ONCE
Refills: 0 | Status: COMPLETED | OUTPATIENT
Start: 2019-09-27 | End: 2019-09-27

## 2019-09-27 RX ORDER — HYDROCHLOROTHIAZIDE 25 MG
25 TABLET ORAL DAILY
Refills: 0 | Status: DISCONTINUED | OUTPATIENT
Start: 2019-09-27 | End: 2019-09-28

## 2019-09-27 RX ORDER — ACETAMINOPHEN 500 MG
650 TABLET ORAL EVERY 6 HOURS
Refills: 0 | Status: DISCONTINUED | OUTPATIENT
Start: 2019-09-27 | End: 2019-09-28

## 2019-09-27 RX ADMIN — Medication 50 MILLIGRAM(S): at 05:30

## 2019-09-27 RX ADMIN — PANTOPRAZOLE SODIUM 40 MILLIGRAM(S): 20 TABLET, DELAYED RELEASE ORAL at 05:31

## 2019-09-27 RX ADMIN — Medication 400 MILLIGRAM(S): at 05:30

## 2019-09-27 RX ADMIN — Medication 400 MILLIGRAM(S): at 00:40

## 2019-09-27 RX ADMIN — SIMVASTATIN 10 MILLIGRAM(S): 20 TABLET, FILM COATED ORAL at 21:01

## 2019-09-27 RX ADMIN — Medication 12.5 MILLIGRAM(S): at 18:57

## 2019-09-27 RX ADMIN — LOSARTAN POTASSIUM 100 MILLIGRAM(S): 100 TABLET, FILM COATED ORAL at 05:30

## 2019-09-27 RX ADMIN — Medication 81 MILLIGRAM(S): at 10:16

## 2019-09-27 RX ADMIN — Medication 400 MILLIGRAM(S): at 13:31

## 2019-09-27 RX ADMIN — Medication 25 MILLIGRAM(S): at 10:17

## 2019-09-27 RX ADMIN — Medication 400 MILLIGRAM(S): at 13:32

## 2019-09-27 RX ADMIN — Medication 400 MILLIGRAM(S): at 21:01

## 2019-09-28 ENCOUNTER — TRANSCRIPTION ENCOUNTER (OUTPATIENT)
Age: 79
End: 2019-09-28

## 2019-09-28 VITALS
TEMPERATURE: 98 F | RESPIRATION RATE: 17 BRPM | HEART RATE: 64 BPM | SYSTOLIC BLOOD PRESSURE: 138 MMHG | OXYGEN SATURATION: 100 % | DIASTOLIC BLOOD PRESSURE: 71 MMHG

## 2019-09-28 PROCEDURE — 93010 ELECTROCARDIOGRAM REPORT: CPT

## 2019-09-28 RX ADMIN — PANTOPRAZOLE SODIUM 40 MILLIGRAM(S): 20 TABLET, DELAYED RELEASE ORAL at 05:38

## 2019-09-28 RX ADMIN — LOSARTAN POTASSIUM 100 MILLIGRAM(S): 100 TABLET, FILM COATED ORAL at 05:38

## 2019-09-28 RX ADMIN — Medication 81 MILLIGRAM(S): at 12:29

## 2019-09-28 RX ADMIN — Medication 25 MILLIGRAM(S): at 05:38

## 2019-09-28 RX ADMIN — ENOXAPARIN SODIUM 40 MILLIGRAM(S): 100 INJECTION SUBCUTANEOUS at 12:29

## 2019-09-28 RX ADMIN — Medication 400 MILLIGRAM(S): at 05:38

## 2019-09-28 RX ADMIN — Medication 50 MILLIGRAM(S): at 05:38

## 2019-09-28 NOTE — DISCHARGE NOTE PROVIDER - NSDCCPCAREPLAN_GEN_ALL_CORE_FT
PRINCIPAL DISCHARGE DIAGNOSIS  Diagnosis: Chest pain  Assessment and Plan of Treatment: atypical chest pain  - resolved, trops neg, no tele events  - Elevated Ddimer with low probability of PE on V/q Scan  - recent negative stress test  - continue aspirin, statin, BB         SECONDARY DISCHARGE DIAGNOSES  Diagnosis: Abnormal pelvic ultrasound  Assessment and Plan of Treatment: -performed 8/2019  -pt to follow up with GYN for biopsy outpt        Diagnosis: Hypertensive urgency  Assessment and Plan of Treatment: - bp improved- continue current home meds  - as per  the patient, she thinks the BP was elevated 2ndry to noncompliance with diet;  Counseled on stric diet and avoiding salt

## 2019-09-28 NOTE — DISCHARGE NOTE PROVIDER - HOSPITAL COURSE
HOSPITALIST PROGRESS NOTE:    SUBJECTIVE:    PCP: PCP: Dr Gusman    Cardio Dr Piper        Chief Complaint: Patient is a 79y old  Female who presents with a chief complaint of cp (27 Sep 2019 16:31)        HPI:    78 yo F with pmh TIA, HFpEF, preDM, HTN, hyperlipidemia, legally blind, p/w complaints of chest pain since 9pm on 9/25. Describes pain as heaviness without any associated radiation, dyspnea, cough, fevers, chills, hemoptysis, palpitations or near syncope. No recent travel or sick contacts. No LE swelling.  No recent surgeries. Last cardiac cath performed 3/17 with evidence of non obstructive cad. EF 60%    ED course: given  x 1. 3 sets of troponins negative. EKG NSR with no dynamic changes. VSS        9/28: Above reviewed; Patient has no complaints. Wants to go home today; BP has been normal since she has been here; She thinks it may be her diet that cuased her bpp to be elevated         9y female w/         1. atypical chest pain    - resolved, trops neg, no tele events    - Elevated Ddimer with low probability of PE on V/q Scan    - Cardio eval appreciated- recent negative stress test    - continue aspirin, statin, BB         2. htn urgency    - bp improved- continue current home meds    - as per  the patient, she thinks the BP was elevated 2ndry to noncompliance with meds          3. abnormal pelvic sono    -performed 8/2019    -pt to follow up with GYN for biopsy outpt              dvt px        total time 45 minutes

## 2019-09-28 NOTE — DISCHARGE NOTE PROVIDER - CARE PROVIDER_API CALL
Shu Rodarte)  Internal Medicine  [address] [place] [address]  Phone: [phone]  Fax: [phone]  Follow Up Time:     Marvel Piper (DO)  Cardiology; Internal Medicine  [address] [place] [address]  Phone: [phone]  Fax: [phone]  Follow Up Time:

## 2019-09-28 NOTE — PROGRESS NOTE ADULT - SUBJECTIVE AND OBJECTIVE BOX
HOSPITALIST PROGRESS NOTE:  SUBJECTIVE:  PCP: PCP: Dr Gusman  Cardio Dr Piper    Chief Complaint: Patient is a 79y old  Female who presents with a chief complaint of cp (27 Sep 2019 16:31)    HPI:  78 yo F with pmh TIA, HFpEF, preDM, HTN, hyperlipidemia, legally blind, p/w complaints of chest pain since 9pm on 9/25. Describes pain as heaviness without any associated radiation, dyspnea, cough, fevers, chills, hemoptysis, palpitations or near syncope. No recent travel or sick contacts. No LE swelling.  No recent surgeries. Last cardiac cath performed 3/17 with evidence of non obstructive cad. EF 60%  ED course: given  x 1. 3 sets of troponins negative. EKG NSR with no dynamic changes. VSS    9/28: Above reviewed; Patient has no complaints. Wants to go home today; BP has been normal since she has been here; She thinks it may be her diet that cuased her bpp to be elevatged       Allergies:  No Known Allergies    REVIEW OF SYSTEMS:  See HPI. All other review of systems is negative unless indicated above.     OBJECTIVE  Physical Exam:  Vital Signs:    Vital Signs Last 24 Hrs  T(C): 36.6 (28 Sep 2019 11:03), Max: 36.6 (28 Sep 2019 11:03)  T(F): 97.8 (28 Sep 2019 11:03), Max: 97.8 (28 Sep 2019 11:03)  HR: 64 (28 Sep 2019 11:03) (60 - 64)  BP: 138/71 (28 Sep 2019 11:03) (137/52 - 161/63)  BP(mean): --  RR: 17 (28 Sep 2019 11:03) (16 - 17)  SpO2: 100% (28 Sep 2019 11:03) (98% - 100%)  I&O's Summary    27 Sep 2019 07:01  -  28 Sep 2019 07:00  --------------------------------------------------------  IN: 0 mL / OUT: 900 mL / NET: -900 mL    Constitutional: NAD, awake and alert, well-developed  Neurological: AAO x 3, no focal deficits  HEENT: PERRLA, EOMI, MMM  Neck: Soft and supple, No LAD, No JVD  Respiratory: Breath sounds are clear bilaterally, No wheezing, rales or rhonchi  Cardiovascular: S1 and S2, regular rate and rhythm; no Murmurs, gallops or rubs  Gastrointestinal: Bowel Sounds present, soft, nontender, nondistended, no guarding, no rebound tenderness  Back: No CVA tenderness   Extremities: No peripheral edema  Vascular: 2+ peripheral pulses  Musculoskeletal: 5/5 strength b/l upper and lower extremities  Skin: No rashes  Breast: Deferred  Rectal: Deferred    MEDICATIONS  (STANDING):  aspirin  chewable 81 milliGRAM(s) Oral daily  enoxaparin Injectable 40 milliGRAM(s) SubCutaneous daily  hydrochlorothiazide 25 milliGRAM(s) Oral daily  losartan 100 milliGRAM(s) Oral daily  metoprolol succinate ER 50 milliGRAM(s) Oral daily  pantoprazole    Tablet 40 milliGRAM(s) Oral before breakfast  simvastatin 10 milliGRAM(s) Oral at bedtime      LABS: All Labs Reviewed:    RADIOLOGY/EKG:    < from: Xray Chest 1 View- PORTABLE-Urgent (09.25.19 @ 23:55) >    Impression:    Clear lungs.    < end of copied text >    < from: NM Pulmonary Ventilation/Perfusion Scan (09.26.19 @ 18:10) >    IMPRESSION: Very low probability of pulmonary embolus.      < end of copied text >
cc: chest pain, htn  hpi: 79y female w/ pmh tia, chronic diastolic CHF, htn, legally blind p/w chest pain and elevated bp 180s at home.    feeling much better today but worried about going home and blood pressure being high again (lives alone and blind).  Discussed will monitor tonight.     ros- asper hpi  Vital Signs Last 24 Hrs  T(C): 36.5 (27 Sep 2019 10:06), Max: 36.5 (27 Sep 2019 10:06)  T(F): 97.7 (27 Sep 2019 10:06), Max: 97.7 (27 Sep 2019 10:06)  HR: 60 (27 Sep 2019 13:28) (60 - 64)  BP: 153/60 (27 Sep 2019 13:28) (146/55 - 153/60)  BP(mean): --  RR: 16 (27 Sep 2019 10:06) (16 - 16)  SpO2: 95% (27 Sep 2019 10:06) (95% - 100%)      PHYSICAL EXAM:  General: NAD, comfortable- seated in chair  Neuro: AAOx3, no focal deficits  HEENT: NCAT  Neck: Soft and supple  Respiratory: CTA b/l, no w/r/r  Cardiovascular: S1 and S2, RRR  Gastrointestinal: +BS, soft, NTND  Extremities: No edema  Vascular: 2+ peripheral pulses          LABS: All Labs Reviewed:                        11.7   9.48  )-----------( 223      ( 25 Sep 2019 22:59 )             35.7     09-25    141  |  109<H>  |  21  ----------------------------<  99  3.4<L>   |  24  |  1.16    Ca    8.8      25 Sep 2019 22:59    TPro  6.8  /  Alb  3.1<L>  /  TBili  0.4  /  DBili  x   /  AST  27  /  ALT  26  /  AlkPhos  103  09-25      CARDIAC MARKERS ( 26 Sep 2019 05:19 )  <0.015 ng/mL / x     / x     / x     / x      CARDIAC MARKERS ( 26 Sep 2019 01:23 )  <0.015 ng/mL / x     / x     / x     / x      CARDIAC MARKERS ( 25 Sep 2019 22:59 )  <0.015 ng/mL / x     / x     / x     / x            MEDICATIONS  (STANDING):  aspirin  chewable 81 milliGRAM(s) Oral daily  enoxaparin Injectable 40 milliGRAM(s) SubCutaneous daily  hydrochlorothiazide 25 milliGRAM(s) Oral daily  ibuprofen  Tablet. 400 milliGRAM(s) Oral three times a day  influenza   Vaccine 0.5 milliLiter(s) IntraMuscular once  losartan 100 milliGRAM(s) Oral daily  metoprolol succinate ER 50 milliGRAM(s) Oral daily  pantoprazole    Tablet 40 milliGRAM(s) Oral before breakfast  simvastatin 10 milliGRAM(s) Oral at bedtime    MEDICATIONS  (PRN):  acetaminophen   Tablet .. 650 milliGRAM(s) Oral every 6 hours PRN Temp greater or equal to 38C (100.4F), Mild Pain (1 - 3)  ondansetron Injectable 4 milliGRAM(s) IV Push every 6 hours PRN Nausea  senna 2 Tablet(s) Oral at bedtime PRN Constipation        Assessment and Plan:   79y female w/       1. atypical chest pain  - resolved, trops neg, no tele events  - Cardio eval appreciated- recent negative stress test  - continue aspirin, statin, BB     2. htn urgency  - bp improved- continue current meds    3. abnormal pelvic sono  -performed 8/2019  -pt to follow up with GYN for biopsy outpt        dvt px
Patient is a 79y old  Female who presents with a chief complaint of cp.       HPI:  80 yo F with pmh TIA, HFpEF, preDM, HTN, hyperlipidemia, legally blind, p/w complaints of chest pain since 9pm on . Describes pain as heaviness without any associated radiation, dyspnea, cough, fevers, chills, hemoptysis, palpitations or near syncope. No recent travel or sick contacts. No LE swelling.  No recent surgeries. Last cardiac cath performed 3/17 with evidence of non obstructive cad. EF 60%    ED course: given  x 1. 3 sets of troponins negative. EKG NSR with no dynamic changes. VSS  -  Pt currently denies any CP or pressure .   - pt seen and examined by me today. SHe denies any symptoms this am.     Social: no toxic habits  Fhx: mother: HTN  Shx:        PAST MEDICAL & SURGICAL HISTORY:  Arthritis  DJD (degenerative joint disease)  Legally blind  Cataract: bilateral  HTN (hypertension)  History of       MEDICATIONS  (STANDING):  aspirin  chewable 81 milliGRAM(s) Oral daily  enoxaparin Injectable 80 milliGRAM(s) SubCutaneous every 12 hours  ibuprofen  Tablet. 400 milliGRAM(s) Oral three times a day  influenza   Vaccine 0.5 milliLiter(s) IntraMuscular once  losartan 100 milliGRAM(s) Oral daily  metoprolol succinate ER 50 milliGRAM(s) Oral daily  pantoprazole    Tablet 40 milliGRAM(s) Oral before breakfast  simvastatin 10 milliGRAM(s) Oral at bedtime  sodium chloride 0.9%. 1000 milliLiter(s) (50 mL/Hr) IV Continuous <Continuous>    MEDICATIONS  (PRN):  acetaminophen   Tablet .. 650 milliGRAM(s) Oral every 6 hours PRN Mild Pain (1 - 3)  acetaminophen   Tablet .. 650 milliGRAM(s) Oral every 4 hours PRN Temp greater or equal to 38C (100.4F), Mild Pain (1 - 3)  nitroglycerin     SubLingual 0.4 milliGRAM(s) SubLingual every 5 minutes PRN Chest Pain  ondansetron Injectable 4 milliGRAM(s) IV Push every 6 hours PRN Nausea  senna 2 Tablet(s) Oral at bedtime PRN Constipation      FAMILY HISTORY:  No pertinent family history in first degree relatives    REVIEW OF SYSTEMS:  CONSTITUTIONAL:    No fatigue, malaise, lethargy.  No fever or chills.     RESPIRATORY:  No cough.  No wheeze.  No hemoptysis.  No shortness of breath.  CARDIOVASCULAR:  No chest pains.  No palpitations. No shortness of breath, No orthopnea or PND.  GASTROINTESTINAL:  No abdominal pain.  No nausea or vomiting.    GENITOURINARY:    No hematuria.    MUSCULOSKELETAL:  No musculoskeletal pain.  No joint swelling.  No arthritis.  NEUROLOGICAL:  No tingling or numbness or weakness.  PSYCHIATRIC:  No confusion    ICU Vital Signs Last 24 Hrs  T(C): 36.4 (27 Sep 2019 05:20), Max: 36.7 (26 Sep 2019 10:13)  T(F): 97.5 (27 Sep 2019 05:20), Max: 98 (26 Sep 2019 10:13)  HR: 61 (27 Sep 2019 05:20) (61 - 64)  BP: 152/58 (27 Sep 2019 05:20) (144/48 - 156/54)  BP(mean): --  ABP: --  ABP(mean): --  RR: 16 (27 Sep 2019 05:20) (16 - 17)  SpO2: 100% (27 Sep 2019 05:20) (97% - 100%)      PHYSICAL EXAM-    Constitutional: no acute distress     Head: Head is normocephalic and atraumatic.      Neck: No JVD.     Cardiovascular: Regular rate and rhythm without S3, S4. No murmurs or rubs are appreciated.      Respiratory: Breath sounds are normal. No rales. No wheezing.    Abdomen: Soft, nontender, nondistended with positive bowel sounds.      Extremity: No tenderness. No  pitting edema     Neurologic: The patient is alert and oriented.      Skin: No rash, no obvious lesions noted.      Psychiatric: The patient appears to be emotionally stable.      INTERPRETATION OF TELEMETRY: sinus rythm    ECG: Sinus rythm , l axis, no ST T changes.     I&O's Detail      LABS:                                   11.7   9.48  )-----------( 223      ( 25 Sep 2019 22:59 )             35.7     -    141  |  109<H>  |  21  ----------------------------<  99  3.4<L>   |  24  |  1.16    Ca    8.8      25 Sep 2019 22:59    TPro  6.8  /  Alb  3.1<L>  /  TBili  0.4  /  DBili  x   /  AST  27  /  ALT  26  /  AlkPhos  103      CARDIAC MARKERS ( 26 Sep 2019 05:19 )  <0.015 ng/mL / x     / x     / x     / x      CARDIAC MARKERS ( 26 Sep 2019 01:23 )  <0.015 ng/mL / x     / x     / x     / x      CARDIAC MARKERS ( 25 Sep 2019 22:59 )  <0.015 ng/mL / x     / x     / x     / x          LIVER FUNCTIONS - ( 25 Sep 2019 22:59 )  Alb: 3.1 g/dL / Pro: 6.8 gm/dL / ALK PHOS: 103 U/L / ALT: 26 U/L / AST: 27 U/L / GGT: x                         I&O's Summary    BNP  RADIOLOGY & ADDITIONAL STUDIES:  < from: Xray Chest 1 View- PORTABLE-Urgent (19 @ 23:55) >    EXAM:  XR CHEST PORTABLE URGENT 1V                            PROCEDURE DATE:  2019          INTERPRETATION:  Clinical indication:  cp chest pain    Technique: XR CHEST URGENT portable AP    Comparison:3/18/2017.    Findings:  Lines: None    Heart/Mediastinum/Lungs: The heart size is normal.The lungs are   clear.There are no pleural effusions.    Impression:    Clear lungs.                SANDRA ROBB M.D.    < end of copied text >

## 2019-09-28 NOTE — PROGRESS NOTE ADULT - ASSESSMENT
Chest pain - atypical in nature.  in the setting of elevated BP.  BP normalizing  so far cardiac enzymes and EKG did not reveal any ischemia.  AMbulate the pt today to  reasses symptoms.   She had a stress test 8/2019 which did not reveal any ischemia.     HTN urgency- BP mildy elevated this am before meds .  Recheck meds   continue current meds.      Other medical issues- Management per primary team.   Thank you for allowing me to participate in the care of this patient. Please feel free to contact me with any questions.
79y female w/     1. atypical chest pain  - resolved, trops neg, no tele events  - Elevated Ddimer with low probability of PE on V/q Scan  - Cardio eval appreciated- recent negative stress test  - continue aspirin, statin, BB     2. htn urgency  - bp improved- continue current home meds  - as per  the patient, she thinks the BP was elevated 2ndry to noncompliance with meds      3. abnormal pelvic sono  -performed 8/2019  -pt to follow up with GYN for biopsy outpt        dvt px

## 2019-09-28 NOTE — DISCHARGE NOTE NURSING/CASE MANAGEMENT/SOCIAL WORK - PATIENT PORTAL LINK FT
You can access the FollowMyHealth Patient Portal offered by [facility] by registering at the following website: [email or web address]. By joining [employer]’s FollowMyHealth portal, you will also be able to view your health information using other applications (apps) compatible with our system.

## 2019-10-02 DIAGNOSIS — R07.89 OTHER CHEST PAIN: ICD-10-CM

## 2019-10-02 DIAGNOSIS — Z86.73 PERSONAL HISTORY OF TRANSIENT ISCHEMIC ATTACK (TIA), AND CEREBRAL INFARCTION WITHOUT RESIDUAL DEFICITS: ICD-10-CM

## 2019-10-02 DIAGNOSIS — M19.90 UNSPECIFIED OSTEOARTHRITIS, UNSPECIFIED SITE: ICD-10-CM

## 2019-10-02 DIAGNOSIS — I16.0 HYPERTENSIVE URGENCY: ICD-10-CM

## 2019-10-02 DIAGNOSIS — I50.30 UNSPECIFIED DIASTOLIC (CONGESTIVE) HEART FAILURE: ICD-10-CM

## 2019-10-02 DIAGNOSIS — I25.10 ATHEROSCLEROTIC HEART DISEASE OF NATIVE CORONARY ARTERY WITHOUT ANGINA PECTORIS: ICD-10-CM

## 2019-10-02 DIAGNOSIS — E78.5 HYPERLIPIDEMIA, UNSPECIFIED: ICD-10-CM

## 2019-10-02 DIAGNOSIS — I11.0 HYPERTENSIVE HEART DISEASE WITH HEART FAILURE: ICD-10-CM

## 2019-10-02 DIAGNOSIS — H54.8 LEGAL BLINDNESS, AS DEFINED IN USA: ICD-10-CM

## 2019-10-02 DIAGNOSIS — Z79.82 LONG TERM (CURRENT) USE OF ASPIRIN: ICD-10-CM

## 2019-10-02 DIAGNOSIS — R73.03 PREDIABETES: ICD-10-CM

## 2019-10-09 ENCOUNTER — EMERGENCY (EMERGENCY)
Facility: HOSPITAL | Age: 79
LOS: 0 days | Discharge: HOME | End: 2019-10-09
Attending: EMERGENCY MEDICINE
Payer: COMMERCIAL

## 2019-10-09 VITALS
HEIGHT: 64 IN | DIASTOLIC BLOOD PRESSURE: 78 MMHG | TEMPERATURE: 98 F | SYSTOLIC BLOOD PRESSURE: 161 MMHG | HEART RATE: 78 BPM | OXYGEN SATURATION: 97 % | RESPIRATION RATE: 16 BRPM | WEIGHT: 184.97 LBS

## 2019-10-09 VITALS
RESPIRATION RATE: 16 BRPM | DIASTOLIC BLOOD PRESSURE: 63 MMHG | TEMPERATURE: 98 F | OXYGEN SATURATION: 100 % | HEART RATE: 61 BPM | SYSTOLIC BLOOD PRESSURE: 122 MMHG

## 2019-10-09 DIAGNOSIS — R11.0 NAUSEA: ICD-10-CM

## 2019-10-09 DIAGNOSIS — Z98.89 OTHER SPECIFIED POSTPROCEDURAL STATES: Chronic | ICD-10-CM

## 2019-10-09 DIAGNOSIS — I11.0 HYPERTENSIVE HEART DISEASE WITH HEART FAILURE: ICD-10-CM

## 2019-10-09 DIAGNOSIS — R73.03 PREDIABETES: ICD-10-CM

## 2019-10-09 DIAGNOSIS — I50.9 HEART FAILURE, UNSPECIFIED: ICD-10-CM

## 2019-10-09 DIAGNOSIS — Z86.73 PERSONAL HISTORY OF TRANSIENT ISCHEMIC ATTACK (TIA), AND CEREBRAL INFARCTION WITHOUT RESIDUAL DEFICITS: ICD-10-CM

## 2019-10-09 DIAGNOSIS — H54.8 LEGAL BLINDNESS, AS DEFINED IN USA: ICD-10-CM

## 2019-10-09 DIAGNOSIS — R42 DIZZINESS AND GIDDINESS: ICD-10-CM

## 2019-10-09 DIAGNOSIS — E78.5 HYPERLIPIDEMIA, UNSPECIFIED: ICD-10-CM

## 2019-10-09 LAB
ALBUMIN SERPL ELPH-MCNC: 3.3 G/DL — SIGNIFICANT CHANGE UP (ref 3.3–5)
ALP SERPL-CCNC: 114 U/L — SIGNIFICANT CHANGE UP (ref 40–120)
ALT FLD-CCNC: 27 U/L — SIGNIFICANT CHANGE UP (ref 12–78)
ANION GAP SERPL CALC-SCNC: 7 MMOL/L — SIGNIFICANT CHANGE UP (ref 5–17)
AST SERPL-CCNC: 22 U/L — SIGNIFICANT CHANGE UP (ref 15–37)
BASOPHILS # BLD AUTO: 0.01 K/UL — SIGNIFICANT CHANGE UP (ref 0–0.2)
BASOPHILS NFR BLD AUTO: 0.1 % — SIGNIFICANT CHANGE UP (ref 0–2)
BILIRUB SERPL-MCNC: 0.4 MG/DL — SIGNIFICANT CHANGE UP (ref 0.2–1.2)
BUN SERPL-MCNC: 16 MG/DL — SIGNIFICANT CHANGE UP (ref 7–23)
CALCIUM SERPL-MCNC: 9.5 MG/DL — SIGNIFICANT CHANGE UP (ref 8.5–10.1)
CHLORIDE SERPL-SCNC: 109 MMOL/L — HIGH (ref 96–108)
CO2 SERPL-SCNC: 29 MMOL/L — SIGNIFICANT CHANGE UP (ref 22–31)
CREAT SERPL-MCNC: 0.86 MG/DL — SIGNIFICANT CHANGE UP (ref 0.5–1.3)
EOSINOPHIL # BLD AUTO: 0.02 K/UL — SIGNIFICANT CHANGE UP (ref 0–0.5)
EOSINOPHIL NFR BLD AUTO: 0.2 % — SIGNIFICANT CHANGE UP (ref 0–6)
GLUCOSE SERPL-MCNC: 105 MG/DL — HIGH (ref 70–99)
HCT VFR BLD CALC: 37.8 % — SIGNIFICANT CHANGE UP (ref 34.5–45)
HGB BLD-MCNC: 12.1 G/DL — SIGNIFICANT CHANGE UP (ref 11.5–15.5)
IMM GRANULOCYTES NFR BLD AUTO: 0.6 % — SIGNIFICANT CHANGE UP (ref 0–1.5)
LYMPHOCYTES # BLD AUTO: 1.19 K/UL — SIGNIFICANT CHANGE UP (ref 1–3.3)
LYMPHOCYTES # BLD AUTO: 13.6 % — SIGNIFICANT CHANGE UP (ref 13–44)
MCHC RBC-ENTMCNC: 31.9 PG — SIGNIFICANT CHANGE UP (ref 27–34)
MCHC RBC-ENTMCNC: 32 GM/DL — SIGNIFICANT CHANGE UP (ref 32–36)
MCV RBC AUTO: 99.7 FL — SIGNIFICANT CHANGE UP (ref 80–100)
MONOCYTES # BLD AUTO: 0.45 K/UL — SIGNIFICANT CHANGE UP (ref 0–0.9)
MONOCYTES NFR BLD AUTO: 5.1 % — SIGNIFICANT CHANGE UP (ref 2–14)
NEUTROPHILS # BLD AUTO: 7.04 K/UL — SIGNIFICANT CHANGE UP (ref 1.8–7.4)
NEUTROPHILS NFR BLD AUTO: 80.4 % — HIGH (ref 43–77)
PLATELET # BLD AUTO: 249 K/UL — SIGNIFICANT CHANGE UP (ref 150–400)
POTASSIUM SERPL-MCNC: 3.2 MMOL/L — LOW (ref 3.5–5.3)
POTASSIUM SERPL-SCNC: 3.2 MMOL/L — LOW (ref 3.5–5.3)
PROT SERPL-MCNC: 7.2 GM/DL — SIGNIFICANT CHANGE UP (ref 6–8.3)
RBC # BLD: 3.79 M/UL — LOW (ref 3.8–5.2)
RBC # FLD: 13.3 % — SIGNIFICANT CHANGE UP (ref 10.3–14.5)
SODIUM SERPL-SCNC: 145 MMOL/L — SIGNIFICANT CHANGE UP (ref 135–145)
WBC # BLD: 8.76 K/UL — SIGNIFICANT CHANGE UP (ref 3.8–10.5)
WBC # FLD AUTO: 8.76 K/UL — SIGNIFICANT CHANGE UP (ref 3.8–10.5)

## 2019-10-09 PROCEDURE — 93005 ELECTROCARDIOGRAM TRACING: CPT

## 2019-10-09 PROCEDURE — 85025 COMPLETE CBC W/AUTO DIFF WBC: CPT

## 2019-10-09 PROCEDURE — 99284 EMERGENCY DEPT VISIT MOD MDM: CPT | Mod: 25

## 2019-10-09 PROCEDURE — 99284 EMERGENCY DEPT VISIT MOD MDM: CPT

## 2019-10-09 PROCEDURE — 36415 COLL VENOUS BLD VENIPUNCTURE: CPT

## 2019-10-09 PROCEDURE — 80053 COMPREHEN METABOLIC PANEL: CPT

## 2019-10-09 PROCEDURE — 70450 CT HEAD/BRAIN W/O DYE: CPT | Mod: 26

## 2019-10-09 PROCEDURE — 70450 CT HEAD/BRAIN W/O DYE: CPT

## 2019-10-09 PROCEDURE — 93010 ELECTROCARDIOGRAM REPORT: CPT

## 2019-10-09 RX ORDER — SODIUM CHLORIDE 9 MG/ML
3 INJECTION INTRAMUSCULAR; INTRAVENOUS; SUBCUTANEOUS ONCE
Refills: 0 | Status: COMPLETED | OUTPATIENT
Start: 2019-10-09 | End: 2019-10-09

## 2019-10-09 RX ORDER — ONDANSETRON 8 MG/1
4 TABLET, FILM COATED ORAL ONCE
Refills: 0 | Status: DISCONTINUED | OUTPATIENT
Start: 2019-10-09 | End: 2019-10-09

## 2019-10-09 RX ORDER — ONDANSETRON 8 MG/1
4 TABLET, FILM COATED ORAL ONCE
Refills: 0 | Status: COMPLETED | OUTPATIENT
Start: 2019-10-09 | End: 2019-10-09

## 2019-10-09 RX ORDER — MECLIZINE HCL 12.5 MG
25 TABLET ORAL ONCE
Refills: 0 | Status: COMPLETED | OUTPATIENT
Start: 2019-10-09 | End: 2019-10-09

## 2019-10-09 RX ORDER — MECLIZINE HCL 12.5 MG
1 TABLET ORAL
Qty: 24 | Refills: 0
Start: 2019-10-09 | End: 2019-10-14

## 2019-10-09 RX ADMIN — ONDANSETRON 4 MILLIGRAM(S): 8 TABLET, FILM COATED ORAL at 10:49

## 2019-10-09 RX ADMIN — Medication 25 MILLIGRAM(S): at 09:55

## 2019-10-09 NOTE — ED PROVIDER NOTE - OBJECTIVE STATEMENT
80 yo AA F, PMH of HTN, HLD, pre-DM, TIA, CHF, legally blind, admitted HH re: HTN urgency with assoc. chest pain (troponin neg. X 3, BP controlled w/ meds adjustment) BIBA from home re: dizziness.  + Acute onset objective vertigo ~ 6 AM when pt lifted her head up off pillow.  + Associated N w/o any V.  Vertigo + exacerbated by head/neck movements & when pt sits/gets up.  No HA, F/C, cp, SOB.  Pt reports having taken her AM meds at 5:00.  PCP: LORIN Balderas.    Cardio: Feliz.

## 2019-10-09 NOTE — ED PROVIDER NOTE - CONSTITUTIONAL, MLM
normal... Older AA F, awake, alert, oriented to person, place, time/situation and in no apparent distress.  No respiratory discomfort, non-toxic.

## 2019-10-09 NOTE — ED PROVIDER NOTE - EYES, MLM
Clear bilaterally, pupils equal, round and reactive to light.  EOMI, + mild horizontal nystagmus with associated dizzy sensation.

## 2019-10-09 NOTE — ED ADULT NURSE NOTE - OBJECTIVE STATEMENT
patient states that she woke this morning and the room was spinning.  she denies cp or abdominal pain.  states she took her bp meds at 5 am.  meds were adjusted last week when patient was hospitalized for HTN.

## 2019-10-09 NOTE — ED PROVIDER NOTE - NEUROLOGICAL, MLM
Alert and oriented X 3, CNs II - XII intact, normal speech, no focal deficits, no motor or sensory deficits.

## 2019-10-09 NOTE — ED ADULT TRIAGE NOTE - CHIEF COMPLAINT QUOTE
Pt. to the ED BIBA C/O Chest and Abdominal with light handedness since this morning - Hx of HTN and recent admission to the ED Pt. to the ED BIBA C/O Chest and Abdominal with light handedness since this morning - Hx of HTN and recent admission to the ED- Pt. denies AMS, Muscle weakness and or trouble walking or speaking - A&O x 4

## 2019-10-09 NOTE — ED PROVIDER NOTE - MUSCULOSKELETAL, MLM
Spine appears normal, range of motion is not limited, no muscle or joint tenderness.  LCIEA x 4, no focal swelling, tenderness.

## 2019-10-09 NOTE — ED PROVIDER NOTE - CARE PROVIDER_API CALL
Lisette Balderas (MBBS)  Pediatrics  [address] [place] [address]  Phone: [phone]  Fax: [phone]  Follow Up Time:

## 2019-10-09 NOTE — ED PROVIDER NOTE - PATIENT PORTAL LINK FT
You can access the FollowMyHealth Patient Portal offered by [facility] by registering at the following website: [email or web address]. By joining [employer]’s FollowMyHealth portal, you will also be able to view your health information using other applications (apps) compatible with our system.

## 2019-10-09 NOTE — ED PROVIDER NOTE - CLINICAL SUMMARY MEDICAL DECISION MAKING FREE TEXT BOX
80 yo AA F, PMH of HTN, HLD, pre-DM, TIA, CHF, legally blind, admitted HH re: HTN urgency with assoc. chest pain (troponin neg. X 3, BP controlled w/ meds adjustment) BIBA from home re: acute onset vertigo incurred when lifted head up off pillow.  Symptoms exacerbated by head/neck movement, + N w/o V.  Pt took BP meds this AM.  VSS.  + Horizontal nystagmus noted, normal neuro exam.  Plan: CT head, labs, Zofran, po meclizine, EKG.  Monitor, observe, reassess, incl. ambulation trial.

## 2019-10-09 NOTE — ED PROVIDER NOTE - NSFOLLOWUPINSTRUCTIONS_ED_ALL_ED_FT
Take medication as prescribed.  Continue your regular medications as per routine.  Follow up Dr. Balderas next 2 - 3 days.  Return to ED if dizziness worsens despite the medicine.      ED evaluation and management discussed with the patient and family (if available) in detail.  Close PMD follow up encouraged.  Strict ED return instructions discussed in detail and patient given the opportunity to ask any questions about their discharge diagnosis and instructions. Patient verbalized understanding.        Vertigo  Image   Vertigo means that you feel like you are moving when you are not. Vertigo can also make you feel like things around you are moving when they are not. This feeling can come and go at any time. Vertigo often goes away on its own.  Follow these instructions at home:  Avoid making fast movements.Avoid driving.Avoid using heavy machinery.Avoid doing any task or activity that might cause danger to you or other people if you would have a vertigo attack while you are doing it.Sit down right away if you feel dizzy or have trouble with your balance.Take over-the-counter and prescription medicines only as told by your doctor.Follow instructions from your doctor about which positions or movements you should avoid.Drink enough fluid to keep your pee (urine) clear or pale yellow.Keep all follow-up visits as told by your doctor. This is important.Contact a doctor if:  Medicine does not help your vertigo.You have a fever.Your problems get worse or you have new symptoms.Your family or friends see changes in your behavior.You feel sick to your stomach (nauseous) or you throw up (vomit).You have a “pins and needles” feeling or you are numb in part of your body.Get help right away if:  You have trouble moving or talking.You are always dizzy.You pass out (faint).You get very bad headaches.You feel weak or have trouble using your hands, arms, or legs.You have changes in your hearing.You have changes in your seeing (vision).You get a stiff neck.Bright light starts to bother you.This information is not intended to replace advice given to you by your health care provider. Make sure you discuss any questions you have with your health care provider.

## 2019-10-09 NOTE — ED PROVIDER NOTE - PROGRESS NOTE DETAILS
Dr. Bullard:  Reevaluated patient at bedside.  Patient feeling improved, no HA nor any N.  Informed by ED RN pt passed ambulation trial.  Discussed the results of all diagnostic testing in ED and copies of all reports given.   An opportunity to ask questions was given.  Discussed the importance of prompt, close medical follow-up.  Patient will return with any changes, concerns or persistent / worsening symptoms.  Understanding of all instructions verbalized.

## 2019-10-09 NOTE — ED ADULT NURSE NOTE - INTERVENTIONS DEFINITIONS
[place] to call system/Call bell, personal items and telephone within reach/Instruct patient to call for assistance/Stretcher in lowest position, wheels locked, appropriate side rails in place

## 2019-10-09 NOTE — ED ADULT NURSE NOTE - CHIEF COMPLAINT QUOTE
Pt. to the ED BIBA C/O Chest and Abdominal with light handedness since this morning - Hx of HTN and recent admission to the ED- Pt. denies AMS, Muscle weakness and or trouble walking or speaking - A&O x 4

## 2019-10-09 NOTE — ED PROVIDER NOTE - CHPI ED SYMPTOMS NEG
no weakness/no blurred vision/no vomiting/no loss of consciousness/no confusion/no fever/no numbness/no change in level of consciousness

## 2020-09-01 NOTE — PATIENT PROFILE ADULT. - CAREGIVER
- Creatinine stable  - hold diuretics, reassess volume status/creatinine in AM off milrinone  - avoid nephrotoxic medications Declines

## 2020-10-29 NOTE — PATIENT PROFILE ADULT - NSPROPTRIGHTNOTIFY_GEN_A_NUR
Subjective: Patient developed a pimple on his right buttocks a few days ago that popped and now he has 1 on his left leg medial to the patella.  It was pretty painful last night.  It has not popped yet.  He is not running a fever.  His brother had a similar infection a year ago and it was a Staphylococcus bacteria but not MRSA.  He is otherwise healthy.    Objective: Temperature with dime sized area of redness underneath it.  He can move the knee well.  I do not see anything in the buttocks region.  He said it popped.    Assessment and plan: Pimple near the joint, brother with similar problem that got pretty messy a year ago, may be overtreatment but will try Keflex for 10 days.  Recheck if not getting better in a day or 2.  
declines

## 2020-11-20 ENCOUNTER — RESULT REVIEW (OUTPATIENT)
Age: 80
End: 2020-11-20

## 2020-11-30 ENCOUNTER — APPOINTMENT (OUTPATIENT)
Dept: ULTRASOUND IMAGING | Facility: CLINIC | Age: 80
End: 2020-11-30
Payer: MEDICARE

## 2020-11-30 ENCOUNTER — OUTPATIENT (OUTPATIENT)
Dept: OUTPATIENT SERVICES | Facility: HOSPITAL | Age: 80
LOS: 1 days | End: 2020-11-30
Payer: MEDICARE

## 2020-11-30 DIAGNOSIS — Z98.89 OTHER SPECIFIED POSTPROCEDURAL STATES: Chronic | ICD-10-CM

## 2020-11-30 DIAGNOSIS — N95.0 POSTMENOPAUSAL BLEEDING: ICD-10-CM

## 2020-11-30 PROCEDURE — 76830 TRANSVAGINAL US NON-OB: CPT

## 2020-11-30 PROCEDURE — 76856 US EXAM PELVIC COMPLETE: CPT

## 2020-11-30 PROCEDURE — 76856 US EXAM PELVIC COMPLETE: CPT | Mod: 26

## 2020-11-30 PROCEDURE — 76830 TRANSVAGINAL US NON-OB: CPT | Mod: 26

## 2020-12-29 ENCOUNTER — APPOINTMENT (OUTPATIENT)
Dept: GYNECOLOGIC ONCOLOGY | Facility: CLINIC | Age: 80
End: 2020-12-29
Payer: MEDICARE

## 2020-12-29 ENCOUNTER — LABORATORY RESULT (OUTPATIENT)
Age: 80
End: 2020-12-29

## 2020-12-29 VITALS
HEIGHT: 62 IN | WEIGHT: 198 LBS | SYSTOLIC BLOOD PRESSURE: 175 MMHG | DIASTOLIC BLOOD PRESSURE: 82 MMHG | BODY MASS INDEX: 36.44 KG/M2 | HEART RATE: 83 BPM

## 2020-12-29 DIAGNOSIS — I10 ESSENTIAL (PRIMARY) HYPERTENSION: ICD-10-CM

## 2020-12-29 DIAGNOSIS — E78.00 PURE HYPERCHOLESTEROLEMIA, UNSPECIFIED: ICD-10-CM

## 2020-12-29 DIAGNOSIS — Z78.9 OTHER SPECIFIED HEALTH STATUS: ICD-10-CM

## 2020-12-29 PROCEDURE — 99072 ADDL SUPL MATRL&STAF TM PHE: CPT

## 2020-12-29 PROCEDURE — 57505 ENDOCERVICAL CURETTAGE: CPT

## 2020-12-29 PROCEDURE — 99203 OFFICE O/P NEW LOW 30 MIN: CPT | Mod: 25

## 2020-12-29 RX ORDER — ERGOCALCIFEROL 1.25 MG/1
1.25 MG CAPSULE, LIQUID FILLED ORAL
Qty: 12 | Refills: 0 | Status: ACTIVE | COMMUNITY
Start: 2020-09-24

## 2020-12-29 RX ORDER — ASPIRIN 81 MG
81 TABLET, DELAYED RELEASE (ENTERIC COATED) ORAL
Refills: 0 | Status: ACTIVE | COMMUNITY

## 2020-12-29 RX ORDER — METOPROLOL SUCCINATE 50 MG/1
50 TABLET, EXTENDED RELEASE ORAL
Qty: 90 | Refills: 0 | Status: ACTIVE | COMMUNITY
Start: 2020-12-18

## 2020-12-29 RX ORDER — HYDROCHLOROTHIAZIDE 25 MG/1
25 TABLET ORAL
Refills: 0 | Status: ACTIVE | COMMUNITY

## 2020-12-29 RX ORDER — AMLODIPINE BESYLATE 10 MG/1
10 TABLET ORAL
Qty: 90 | Refills: 0 | Status: ACTIVE | COMMUNITY
Start: 2020-03-23

## 2020-12-29 RX ORDER — SIMVASTATIN 10 MG/1
10 TABLET, FILM COATED ORAL
Qty: 90 | Refills: 0 | Status: ACTIVE | COMMUNITY
Start: 2020-05-11

## 2021-01-04 ENCOUNTER — RESULT REVIEW (OUTPATIENT)
Age: 81
End: 2021-01-04

## 2021-01-04 ENCOUNTER — OUTPATIENT (OUTPATIENT)
Dept: OUTPATIENT SERVICES | Facility: HOSPITAL | Age: 81
LOS: 1 days | End: 2021-01-04

## 2021-01-04 DIAGNOSIS — R87.619 UNSPECIFIED ABNORMAL CYTOLOGICAL FINDINGS IN SPECIMENS FROM CERVIX UTERI: ICD-10-CM

## 2021-01-04 DIAGNOSIS — Z98.89 OTHER SPECIFIED POSTPROCEDURAL STATES: Chronic | ICD-10-CM

## 2021-01-05 ENCOUNTER — RESULT REVIEW (OUTPATIENT)
Age: 81
End: 2021-01-05

## 2021-01-05 PROCEDURE — 88321 CONSLTJ&REPRT SLD PREP ELSWR: CPT

## 2021-01-11 LAB — CYTOLOGY SPEC DOC CYTO: SIGNIFICANT CHANGE UP

## 2021-01-12 ENCOUNTER — APPOINTMENT (OUTPATIENT)
Dept: GYNECOLOGIC ONCOLOGY | Facility: CLINIC | Age: 81
End: 2021-01-12
Payer: MEDICARE

## 2021-01-12 DIAGNOSIS — R93.89 ABNORMAL FINDINGS ON DIAGNOSTIC IMAGING OF OTHER SPECIFIED BODY STRUCTURES: ICD-10-CM

## 2021-01-12 PROCEDURE — 99215 OFFICE O/P EST HI 40 MIN: CPT

## 2021-01-12 PROCEDURE — 99072 ADDL SUPL MATRL&STAF TM PHE: CPT

## 2021-01-19 ENCOUNTER — APPOINTMENT (OUTPATIENT)
Dept: CT IMAGING | Facility: CLINIC | Age: 81
End: 2021-01-19

## 2021-01-20 ENCOUNTER — APPOINTMENT (OUTPATIENT)
Dept: CT IMAGING | Facility: CLINIC | Age: 81
End: 2021-01-20
Payer: MEDICARE

## 2021-01-20 ENCOUNTER — OUTPATIENT (OUTPATIENT)
Dept: OUTPATIENT SERVICES | Facility: HOSPITAL | Age: 81
LOS: 1 days | End: 2021-01-20
Payer: MEDICARE

## 2021-01-20 DIAGNOSIS — Z98.89 OTHER SPECIFIED POSTPROCEDURAL STATES: Chronic | ICD-10-CM

## 2021-01-20 DIAGNOSIS — C54.1 MALIGNANT NEOPLASM OF ENDOMETRIUM: ICD-10-CM

## 2021-01-20 DIAGNOSIS — Z00.8 ENCOUNTER FOR OTHER GENERAL EXAMINATION: ICD-10-CM

## 2021-01-20 LAB
CANCER AG125 SERPL-ACNC: 11 U/ML
CEA SERPL-MCNC: 2.5 NG/ML

## 2021-01-20 PROCEDURE — 82565 ASSAY OF CREATININE: CPT

## 2021-01-20 PROCEDURE — 74177 CT ABD & PELVIS W/CONTRAST: CPT | Mod: 26

## 2021-01-20 PROCEDURE — 74177 CT ABD & PELVIS W/CONTRAST: CPT

## 2021-01-21 ENCOUNTER — NON-APPOINTMENT (OUTPATIENT)
Age: 81
End: 2021-01-21

## 2021-01-21 DIAGNOSIS — N63.0 UNSPECIFIED LUMP IN UNSPECIFIED BREAST: ICD-10-CM

## 2021-01-26 PROBLEM — N63.0 BREAST NODULE: Status: ACTIVE | Noted: 2021-01-26

## 2021-01-27 ENCOUNTER — NON-APPOINTMENT (OUTPATIENT)
Age: 81
End: 2021-01-27

## 2021-01-28 ENCOUNTER — OUTPATIENT (OUTPATIENT)
Dept: OUTPATIENT SERVICES | Facility: HOSPITAL | Age: 81
LOS: 1 days | End: 2021-01-28
Payer: MEDICARE

## 2021-01-28 VITALS
RESPIRATION RATE: 18 BRPM | HEART RATE: 78 BPM | HEIGHT: 60 IN | TEMPERATURE: 98 F | OXYGEN SATURATION: 100 % | WEIGHT: 190.48 LBS | DIASTOLIC BLOOD PRESSURE: 58 MMHG | SYSTOLIC BLOOD PRESSURE: 149 MMHG

## 2021-01-28 DIAGNOSIS — C54.1 MALIGNANT NEOPLASM OF ENDOMETRIUM: ICD-10-CM

## 2021-01-28 DIAGNOSIS — Z98.89 OTHER SPECIFIED POSTPROCEDURAL STATES: Chronic | ICD-10-CM

## 2021-01-28 DIAGNOSIS — Z98.49 CATARACT EXTRACTION STATUS, UNSPECIFIED EYE: Chronic | ICD-10-CM

## 2021-01-28 DIAGNOSIS — Z01.818 ENCOUNTER FOR OTHER PREPROCEDURAL EXAMINATION: ICD-10-CM

## 2021-01-28 LAB
A1C WITH ESTIMATED AVERAGE GLUCOSE RESULT: 5.7 % — HIGH (ref 4–5.6)
ANION GAP SERPL CALC-SCNC: 4 MMOL/L — LOW (ref 5–17)
APTT BLD: 31.6 SEC — SIGNIFICANT CHANGE UP (ref 27.5–35.5)
BASOPHILS # BLD AUTO: 0.02 K/UL — SIGNIFICANT CHANGE UP (ref 0–0.2)
BASOPHILS NFR BLD AUTO: 0.3 % — SIGNIFICANT CHANGE UP (ref 0–2)
BUN SERPL-MCNC: 10 MG/DL — SIGNIFICANT CHANGE UP (ref 7–23)
CALCIUM SERPL-MCNC: 9.9 MG/DL — SIGNIFICANT CHANGE UP (ref 8.5–10.1)
CHLORIDE SERPL-SCNC: 109 MMOL/L — HIGH (ref 96–108)
CO2 SERPL-SCNC: 29 MMOL/L — SIGNIFICANT CHANGE UP (ref 22–31)
CREAT SERPL-MCNC: 0.89 MG/DL — SIGNIFICANT CHANGE UP (ref 0.5–1.3)
EOSINOPHIL # BLD AUTO: 0.03 K/UL — SIGNIFICANT CHANGE UP (ref 0–0.5)
EOSINOPHIL NFR BLD AUTO: 0.4 % — SIGNIFICANT CHANGE UP (ref 0–6)
ESTIMATED AVERAGE GLUCOSE: 117 MG/DL — HIGH (ref 68–114)
GLUCOSE SERPL-MCNC: 93 MG/DL — SIGNIFICANT CHANGE UP (ref 70–99)
HCT VFR BLD CALC: 39 % — SIGNIFICANT CHANGE UP (ref 34.5–45)
HGB BLD-MCNC: 12.7 G/DL — SIGNIFICANT CHANGE UP (ref 11.5–15.5)
IMM GRANULOCYTES NFR BLD AUTO: 0.1 % — SIGNIFICANT CHANGE UP (ref 0–1.5)
INR BLD: 1.09 RATIO — SIGNIFICANT CHANGE UP (ref 0.88–1.16)
LYMPHOCYTES # BLD AUTO: 1.84 K/UL — SIGNIFICANT CHANGE UP (ref 1–3.3)
LYMPHOCYTES # BLD AUTO: 24.5 % — SIGNIFICANT CHANGE UP (ref 13–44)
MCHC RBC-ENTMCNC: 31.3 PG — SIGNIFICANT CHANGE UP (ref 27–34)
MCHC RBC-ENTMCNC: 32.6 GM/DL — SIGNIFICANT CHANGE UP (ref 32–36)
MCV RBC AUTO: 96.1 FL — SIGNIFICANT CHANGE UP (ref 80–100)
MONOCYTES # BLD AUTO: 0.53 K/UL — SIGNIFICANT CHANGE UP (ref 0–0.9)
MONOCYTES NFR BLD AUTO: 7 % — SIGNIFICANT CHANGE UP (ref 2–14)
NEUTROPHILS # BLD AUTO: 5.09 K/UL — SIGNIFICANT CHANGE UP (ref 1.8–7.4)
NEUTROPHILS NFR BLD AUTO: 67.7 % — SIGNIFICANT CHANGE UP (ref 43–77)
PLATELET # BLD AUTO: 284 K/UL — SIGNIFICANT CHANGE UP (ref 150–400)
POTASSIUM SERPL-MCNC: 3.9 MMOL/L — SIGNIFICANT CHANGE UP (ref 3.5–5.3)
POTASSIUM SERPL-SCNC: 3.9 MMOL/L — SIGNIFICANT CHANGE UP (ref 3.5–5.3)
PROTHROM AB SERPL-ACNC: 12.6 SEC — SIGNIFICANT CHANGE UP (ref 10.6–13.6)
RBC # BLD: 4.06 M/UL — SIGNIFICANT CHANGE UP (ref 3.8–5.2)
RBC # FLD: 13.2 % — SIGNIFICANT CHANGE UP (ref 10.3–14.5)
SODIUM SERPL-SCNC: 142 MMOL/L — SIGNIFICANT CHANGE UP (ref 135–145)
WBC # BLD: 7.52 K/UL — SIGNIFICANT CHANGE UP (ref 3.8–10.5)
WBC # FLD AUTO: 7.52 K/UL — SIGNIFICANT CHANGE UP (ref 3.8–10.5)

## 2021-01-28 PROCEDURE — 83036 HEMOGLOBIN GLYCOSYLATED A1C: CPT

## 2021-01-28 PROCEDURE — G0463: CPT | Mod: 25

## 2021-01-28 PROCEDURE — 86900 BLOOD TYPING SEROLOGIC ABO: CPT

## 2021-01-28 PROCEDURE — 93010 ELECTROCARDIOGRAM REPORT: CPT

## 2021-01-28 PROCEDURE — 93005 ELECTROCARDIOGRAM TRACING: CPT

## 2021-01-28 PROCEDURE — 86850 RBC ANTIBODY SCREEN: CPT

## 2021-01-28 PROCEDURE — 85610 PROTHROMBIN TIME: CPT

## 2021-01-28 PROCEDURE — 85730 THROMBOPLASTIN TIME PARTIAL: CPT

## 2021-01-28 PROCEDURE — 80048 BASIC METABOLIC PNL TOTAL CA: CPT

## 2021-01-28 PROCEDURE — 86901 BLOOD TYPING SEROLOGIC RH(D): CPT

## 2021-01-28 PROCEDURE — 85025 COMPLETE CBC W/AUTO DIFF WBC: CPT

## 2021-01-28 RX ORDER — METOPROLOL TARTRATE 50 MG
1 TABLET ORAL
Qty: 0 | Refills: 0 | DISCHARGE

## 2021-01-28 RX ORDER — RANITIDINE HYDROCHLORIDE 150 MG/1
1 TABLET, FILM COATED ORAL
Qty: 0 | Refills: 0 | DISCHARGE

## 2021-01-28 RX ORDER — LOSARTAN/HYDROCHLOROTHIAZIDE 100MG-25MG
1 TABLET ORAL
Qty: 0 | Refills: 0 | DISCHARGE

## 2021-01-28 NOTE — H&P PST ADULT - NSICDXPASTMEDICALHX_GEN_ALL_CORE_FT
PAST MEDICAL HISTORY:  Ambulates with cane     Arthritis in both knees    Bilateral knee pain     Cataract Left    DJD (degenerative joint disease)     Endometrial cancer Dx 1/2021    Essential tremor in B/L arms    HLD (hyperlipidemia)     HTN (hypertension)     Legally blind

## 2021-01-28 NOTE — H&P PST ADULT - ASSESSMENT
This is a 79 y/o female with endometrial cancer who is scheduled for a laparoscopic total hysterectomy    Patient instructed on     1. To follow instructions as per ASU for NPO status   2. On the use of EZ sponges  3. Aware that she needs medical clearance (has an appointment with Dr. Pascual on 1/1/2021)  5. May take  Metoprolol with a sip of water on morning of procedure   6. Instructed to call [phone] to confirm COVID 19 appointment scheduled for 2/5/2021 at [facility]

## 2021-01-28 NOTE — LETTER BODY
[Dear  ___] : Dear ~RODRÍGUEZ, [I had the pleasure of evaluating your patient, [unfilled] for ___] : I had the pleasure of evaluating your patient, [unfilled] for [unfilled]. [Attached please find my note.] : Attached please find my note. [FreeTextEntry2] : Additional evaluation was performed and biopsy demonstrated likely endometrial cancer. \par She will undergo metastatic workup and likely be scheduled for surgery in the near future and I will keep you updated on her progress. Thank you very much for referring this ernie patient.\par \par  [FreeTextEntry1] : CT, markers

## 2021-01-28 NOTE — HISTORY OF PRESENT ILLNESS
[FreeTextEntry1] : Ms. Farr is a 80 year old  female referred from the [facility], for an SENTHIL pap smear.  LMP is unknown. \par She presented to the ED at [facility] with postmenopausal bleeding in 2019.  \par \par 2019- Pelvic US-\par  Uterus: 8.6 x 4.3 x 5.6 cm. The endometrial echo is thickened measuring up to 1.85 cm with heterogeneity and small cystic spaces. Mild vascularity was documented and there is a small feeding vessel. This may represent a polyp. Other etiologies cannot be excluded and further gynecological evaluation is warranted. The myometrium is grossly unremarkable. \par Right ovary: Not visualized. \par Left ovary: Not visualized.\par \par She did not follow up.\par \par 2020- Pap- SENTHIL; + HRHPV (reviewed at [facility])\par \par 2020- Pelvic US- Uterus: 8.4 x 3.7 x 5.7 cm. Within normal limits.\par Endometrium: Thickened with cystic changes, measuring up to 1.6 cm.\par Ovaries: Not visualized. 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 \par (Path is HH, reviewed by Dr. Mendenhall at [facility] as well)\par \par SH: she is originally from [place]; ; nonsmoker. \par \par She continues to have postmenopausal bleeding, spotting for the past year and a half, at least. \par She reports no pelvic pain or pressure.  She denies any other associated signs or symptoms.  \par She is here today for discussion of results and treatment recommendations.\par \par HM:\par Pap- see above\par Mammo- 2018 abnormal per patient; \par Colonoscopy- 2018 negative per patient\par \par Referred by (GYN) Lucio Perez CNM\par PCP:  [place] Dr. Samara MADISON\par GI : none

## 2021-01-28 NOTE — H&P PST ADULT - HISTORY OF PRESENT ILLNESS
This is a 79 y/o  female with a PMH of HTN, HLD, essential tremor and arthritis who reports she had some post menopausal bleeding and her work up revealed she has endometrial cancer. She is now scheduled for a laparoscopic total hysterectomy. She denies any abdominal pain, back pain, N/V/D or constipation, chest pain or palpations. Does report SOB on exertion.

## 2021-01-28 NOTE — H&P PST ADULT - NSANTHOSAYNRD_GEN_A_CORE
No. NAY screening performed.  STOP BANG Legend: 0-2 = LOW Risk; 3-4 = INTERMEDIATE Risk; 5-8 = HIGH Risk

## 2021-01-29 DIAGNOSIS — Z01.818 ENCOUNTER FOR OTHER PREPROCEDURAL EXAMINATION: ICD-10-CM

## 2021-01-29 DIAGNOSIS — C54.1 MALIGNANT NEOPLASM OF ENDOMETRIUM: ICD-10-CM

## 2021-02-03 ENCOUNTER — APPOINTMENT (OUTPATIENT)
Dept: OBGYN | Facility: CLINIC | Age: 81
End: 2021-02-03

## 2021-02-03 DIAGNOSIS — Z01.818 ENCOUNTER FOR OTHER PREPROCEDURAL EXAMINATION: ICD-10-CM

## 2021-02-05 ENCOUNTER — APPOINTMENT (OUTPATIENT)
Dept: DISASTER EMERGENCY | Facility: CLINIC | Age: 81
End: 2021-02-05

## 2021-02-05 RX ORDER — SODIUM CHLORIDE 9 MG/ML
1000 INJECTION, SOLUTION INTRAVENOUS
Refills: 0 | Status: DISCONTINUED | OUTPATIENT
Start: 2021-02-08 | End: 2021-02-08

## 2021-02-05 RX ORDER — FENTANYL CITRATE 50 UG/ML
50 INJECTION INTRAVENOUS
Refills: 0 | Status: DISCONTINUED | OUTPATIENT
Start: 2021-02-08 | End: 2021-02-08

## 2021-02-06 LAB — SARS-COV-2 N GENE NPH QL NAA+PROBE: NOT DETECTED

## 2021-02-08 ENCOUNTER — OUTPATIENT (OUTPATIENT)
Dept: INPATIENT UNIT | Facility: HOSPITAL | Age: 81
LOS: 1 days | Discharge: HOME | End: 2021-02-08
Payer: MEDICARE

## 2021-02-08 ENCOUNTER — RESULT REVIEW (OUTPATIENT)
Age: 81
End: 2021-02-08

## 2021-02-08 ENCOUNTER — APPOINTMENT (OUTPATIENT)
Dept: GYNECOLOGIC ONCOLOGY | Facility: HOSPITAL | Age: 81
End: 2021-02-08

## 2021-02-08 VITALS
TEMPERATURE: 99 F | SYSTOLIC BLOOD PRESSURE: 153 MMHG | OXYGEN SATURATION: 100 % | DIASTOLIC BLOOD PRESSURE: 75 MMHG | RESPIRATION RATE: 19 BRPM | HEART RATE: 94 BPM

## 2021-02-08 VITALS
DIASTOLIC BLOOD PRESSURE: 80 MMHG | HEIGHT: 62 IN | WEIGHT: 188.94 LBS | OXYGEN SATURATION: 100 % | SYSTOLIC BLOOD PRESSURE: 153 MMHG | HEART RATE: 82 BPM | TEMPERATURE: 98 F | RESPIRATION RATE: 16 BRPM

## 2021-02-08 DIAGNOSIS — Z98.89 OTHER SPECIFIED POSTPROCEDURAL STATES: Chronic | ICD-10-CM

## 2021-02-08 DIAGNOSIS — Z98.49 CATARACT EXTRACTION STATUS, UNSPECIFIED EYE: Chronic | ICD-10-CM

## 2021-02-08 DIAGNOSIS — C54.1 MALIGNANT NEOPLASM OF ENDOMETRIUM: ICD-10-CM

## 2021-02-08 PROCEDURE — 88307 TISSUE EXAM BY PATHOLOGIST: CPT | Mod: 26

## 2021-02-08 PROCEDURE — 52000 CYSTOURETHROSCOPY: CPT | Mod: 59

## 2021-02-08 PROCEDURE — S2900: CPT

## 2021-02-08 PROCEDURE — 88341 IMHCHEM/IMCYTCHM EA ADD ANTB: CPT

## 2021-02-08 PROCEDURE — 88341 IMHCHEM/IMCYTCHM EA ADD ANTB: CPT | Mod: 26

## 2021-02-08 PROCEDURE — 58571 TLH W/T/O 250 G OR LESS: CPT

## 2021-02-08 PROCEDURE — 38570 LAPAROSCOPY LYMPH NODE BIOP: CPT

## 2021-02-08 PROCEDURE — 58571 TLH W/T/O 250 G OR LESS: CPT | Mod: AS

## 2021-02-08 PROCEDURE — 38900 IO MAP OF SENT LYMPH NODE: CPT

## 2021-02-08 PROCEDURE — 88104 CYTOPATH FL NONGYN SMEARS: CPT | Mod: 26

## 2021-02-08 PROCEDURE — 88309 TISSUE EXAM BY PATHOLOGIST: CPT | Mod: 26

## 2021-02-08 PROCEDURE — S2900 ROBOTIC SURGICAL SYSTEM: CPT | Mod: NC

## 2021-02-08 PROCEDURE — 82962 GLUCOSE BLOOD TEST: CPT

## 2021-02-08 PROCEDURE — 38570 LAPAROSCOPY LYMPH NODE BIOP: CPT | Mod: AS

## 2021-02-08 PROCEDURE — 88342 IMHCHEM/IMCYTCHM 1ST ANTB: CPT

## 2021-02-08 PROCEDURE — 88342 IMHCHEM/IMCYTCHM 1ST ANTB: CPT | Mod: 26

## 2021-02-08 PROCEDURE — 38900 IO MAP OF SENT LYMPH NODE: CPT | Mod: AS

## 2021-02-08 PROCEDURE — 88104 CYTOPATH FL NONGYN SMEARS: CPT

## 2021-02-08 PROCEDURE — 88307 TISSUE EXAM BY PATHOLOGIST: CPT

## 2021-02-08 PROCEDURE — 88309 TISSUE EXAM BY PATHOLOGIST: CPT

## 2021-02-08 RX ORDER — OXYCODONE HYDROCHLORIDE 5 MG/1
10 TABLET ORAL ONCE
Refills: 0 | Status: DISCONTINUED | OUTPATIENT
Start: 2021-02-08 | End: 2021-02-08

## 2021-02-08 RX ORDER — KETOROLAC TROMETHAMINE 30 MG/ML
15 SYRINGE (ML) INJECTION ONCE
Refills: 0 | Status: DISCONTINUED | OUTPATIENT
Start: 2021-02-08 | End: 2021-02-08

## 2021-02-08 RX ORDER — TOBRAMYCIN 0.3 %
1 DROPS OPHTHALMIC (EYE) EVERY 4 HOURS
Refills: 0 | Status: DISCONTINUED | OUTPATIENT
Start: 2021-02-08 | End: 2021-02-08

## 2021-02-08 RX ORDER — ONDANSETRON 8 MG/1
4 TABLET, FILM COATED ORAL ONCE
Refills: 0 | Status: COMPLETED | OUTPATIENT
Start: 2021-02-08 | End: 2021-02-08

## 2021-02-08 RX ADMIN — ONDANSETRON 4 MILLIGRAM(S): 8 TABLET, FILM COATED ORAL at 12:57

## 2021-02-08 RX ADMIN — Medication 1 DROP(S): at 16:08

## 2021-02-08 RX ADMIN — Medication 15 MILLIGRAM(S): at 13:01

## 2021-02-08 RX ADMIN — FENTANYL CITRATE 50 MICROGRAM(S): 50 INJECTION INTRAVENOUS at 12:57

## 2021-02-08 RX ADMIN — OXYCODONE HYDROCHLORIDE 5 MILLIGRAM(S): 5 TABLET ORAL at 13:23

## 2021-02-08 RX ADMIN — FENTANYL CITRATE 50 MICROGRAM(S): 50 INJECTION INTRAVENOUS at 13:10

## 2021-02-08 NOTE — ASU DISCHARGE PLAN (ADULT/PEDIATRIC) - PAIN MANAGEMENT
Prescriptions electronically submitted to pharmacy from [facility] Take over-the-counter Tylenol and ibuprofen as needed for pain/Prescriptions electronically submitted to pharmacy from [facility]

## 2021-02-08 NOTE — ASU PATIENT PROFILE, ADULT - PMH
Ambulates with cane    Arthritis  in both knees  Bilateral knee pain    Cataract  Left  DJD (degenerative joint disease)    Endometrial cancer  Dx 1/2021  Essential tremor  in B/L arms  HLD (hyperlipidemia)    HTN (hypertension)    Legally blind

## 2021-02-08 NOTE — ASU PATIENT PROFILE, ADULT - AS SC BRADEN NUTRITION
"  Near-Fainting: Uncertain Cause  Fainting (syncope) is a temporary loss of consciousness (""passing out\""). This happens when blood flow to the brain is reduced. Near-fainting (\""near-syncope\"") is like fainting, but you do not fully \""pass out. \"" Instead, you feel like you are going to pass out, but do not actually lose consciousness. Signs and symptoms  The following are symptoms of near-fainting:  Â· Feeling lightheaded orÂ like you are going to faint  Â· Weak pulse  Â· Nausea  Â· Sweating  Â· Blurred vision  Â· Palpitations  Â· Chest pain  Â· Hard time breathing  Â· Feeling cool and clammy  Causes  This happens when your blood pressure suddenly drops, and not enough blood flows to your brain. Common minor causes include:  Â· Sudden emotional stress such as fear, pain, panic, sight of blood  Â· Straining or overexertion, such as straining while using the toilet, coughing, sneezing  Â· Standing up too quickly, or standing up for too long a time  Â· Pregnancy  More serious causes include:  Â· Very slow, fast, or irregular heart rate, an arrhythmia  Â· Dehydration  Â· Blood loss  Â· Medications, especially blood pressure or heart medicines, or your medicines have recently changed  Â· Heart attack  Â· Heart valve problems  Remember, even âminorâ causes can become serious if you fall and injure yourself, or are driving. Â The exact cause of your episode is not certain. More tests may be needed. It is very important that you follow up with your doctor as advised. Home care  The following guidelines will help you care for yourself at home:  1. Rest today. Resume your normal activities as soon as you are feeling back to normal.  2. If you become lightheaded or dizzy, lie down right away or sit with your head between your knees. 3. Because we do not know the exact cause of your near fainting spell, another spell could occur without warning. Therefore, do not drive a car or use dangerous equipment.  Do not take a bath alone (use a shower " instead). Do not swim alone. You can resume these activities when your doctor says that you are no longer in danger of having a near fainting spell. 4. Stay well hydrated by drinking enough fluid each day. Follow-up care  Follow up with your doctor as instructed. Call 911  Call 911 if any of the following occur:  Â· Another fainting spell occurs, and it is not explained by the common causes listed above  Â· Chest, arm, neck, jaw, back or abdominal pain  Â· Shortness of breath  Â· Weakness, tingling, or numbness in one side of the face, one arm or leg  Â· Slurred speech, confusion, trouble walking or seeing  Â· Seizure  Â· Blood in vomit or stools (black or red color)  When to seek medical care  Get prompt medical attention if any of the following occur:  Â· Recent changes in your medications  Â· Occasional mild lightheadedness, especially when standing up too quickly or straining  Â· (In women) unexpected vaginal bleeding  Â© 3284-7379 [employer] [address] [facility] [address]. All rights reserved. This information is not intended as a substitute for professional medical care. Always follow your healthcare professional's instructions. (3) adequate

## 2021-02-08 NOTE — BRIEF OPERATIVE NOTE - NSICDXBRIEFPROCEDURE_GEN_ALL_CORE_FT
PROCEDURES:  Cystoscopy 08-Feb-2021 13:06:05  Silvia De La Cruz  Hysterectomy, robot-assisted, with lymphadenectomy 08-Feb-2021 13:05:55  DorothySilvia Mitchell

## 2021-02-08 NOTE — BRIEF OPERATIVE NOTE - OPERATION/FINDINGS
unremarkable uterus and b/l tubes and ovaries; SLN mapping to obturator basins bilaterally; no obvious metastatic disease

## 2021-02-08 NOTE — ASU DISCHARGE PLAN (ADULT/PEDIATRIC) - CARE PROVIDER_API CALL
Silvia De La Cruz)  Gynecologic Oncology; Obstetrics and Gynecology  [address] [place] [address]  Phone: [phone]  Fax: [phone]  Established Patient  Follow Up Time: 2 weeks

## 2021-02-08 NOTE — ASU DISCHARGE PLAN (ADULT/PEDIATRIC) - CALL YOUR DOCTOR IF YOU HAVE ANY OF THE FOLLOWING:
Bleeding that does not stop/Swelling that gets worse/Pain not relieved by Medications/Fever greater than (need to indicate Fahrenheit or Celsius)/Wound/Surgical Site with redness, or foul smelling discharge or pus/Numbness, tingling, color or temperature change to extremity/Nausea and vomiting that does not stop/Unable to urinate/Excessive diarrhea/Increased irritability or sluggishness

## 2021-02-08 NOTE — ASU DISCHARGE PLAN (ADULT/PEDIATRIC) - PROCEDURE
robotic assisted total hysterectomy bl salpingoopherectomy and pelvic lymph nodes dissection robotic assisted total hysterectomy /BSO /pelvic sentinel lymph node dissection

## 2021-02-08 NOTE — ASU DISCHARGE PLAN (ADULT/PEDIATRIC) - COMMENTS
your Postop Appointment is with Dr. Beauchamp on 2/23/21 at 8:30 am at the [address] [place] [address].

## 2021-02-11 ENCOUNTER — NON-APPOINTMENT (OUTPATIENT)
Age: 81
End: 2021-02-11

## 2021-02-13 DIAGNOSIS — Z79.82 LONG TERM (CURRENT) USE OF ASPIRIN: ICD-10-CM

## 2021-02-13 DIAGNOSIS — E78.00 PURE HYPERCHOLESTEROLEMIA, UNSPECIFIED: ICD-10-CM

## 2021-02-13 DIAGNOSIS — N80.0 ENDOMETRIOSIS OF UTERUS: ICD-10-CM

## 2021-02-13 DIAGNOSIS — N88.8 OTHER SPECIFIED NONINFLAMMATORY DISORDERS OF CERVIX UTERI: ICD-10-CM

## 2021-02-13 DIAGNOSIS — I25.10 ATHEROSCLEROTIC HEART DISEASE OF NATIVE CORONARY ARTERY WITHOUT ANGINA PECTORIS: ICD-10-CM

## 2021-02-13 DIAGNOSIS — M17.0 BILATERAL PRIMARY OSTEOARTHRITIS OF KNEE: ICD-10-CM

## 2021-02-13 DIAGNOSIS — N72 INFLAMMATORY DISEASE OF CERVIX UTERI: ICD-10-CM

## 2021-02-13 DIAGNOSIS — I10 ESSENTIAL (PRIMARY) HYPERTENSION: ICD-10-CM

## 2021-02-13 DIAGNOSIS — C54.1 MALIGNANT NEOPLASM OF ENDOMETRIUM: ICD-10-CM

## 2021-02-13 DIAGNOSIS — N83.201 UNSPECIFIED OVARIAN CYST, RIGHT SIDE: ICD-10-CM

## 2021-02-13 DIAGNOSIS — N84.0 POLYP OF CORPUS UTERI: ICD-10-CM

## 2021-02-23 ENCOUNTER — APPOINTMENT (OUTPATIENT)
Dept: GYNECOLOGIC ONCOLOGY | Facility: CLINIC | Age: 81
End: 2021-02-23
Payer: MEDICARE

## 2021-02-23 VITALS — HEART RATE: 79 BPM | SYSTOLIC BLOOD PRESSURE: 139 MMHG | DIASTOLIC BLOOD PRESSURE: 75 MMHG

## 2021-02-23 PROBLEM — M25.561 PAIN IN RIGHT KNEE: Chronic | Status: ACTIVE | Noted: 2021-01-28

## 2021-02-23 PROBLEM — E78.5 HYPERLIPIDEMIA, UNSPECIFIED: Chronic | Status: ACTIVE | Noted: 2021-01-28

## 2021-02-23 PROBLEM — C54.1 MALIGNANT NEOPLASM OF ENDOMETRIUM: Chronic | Status: ACTIVE | Noted: 2021-01-28

## 2021-02-23 PROBLEM — M19.90 UNSPECIFIED OSTEOARTHRITIS, UNSPECIFIED SITE: Chronic | Status: ACTIVE | Noted: 2017-02-20

## 2021-02-23 PROBLEM — G25.0 ESSENTIAL TREMOR: Chronic | Status: ACTIVE | Noted: 2021-01-28

## 2021-02-23 PROBLEM — Z99.89 DEPENDENCE ON OTHER ENABLING MACHINES AND DEVICES: Chronic | Status: ACTIVE | Noted: 2021-01-28

## 2021-02-23 PROCEDURE — 99024 POSTOP FOLLOW-UP VISIT: CPT

## 2021-03-03 ENCOUNTER — NON-APPOINTMENT (OUTPATIENT)
Age: 81
End: 2021-03-03

## 2021-03-11 NOTE — ED ADULT TRIAGE NOTE - SPO2 (%)
98 Full Thickness Lip Wedge Repair (Flap) Text: Given the location of the defect and the proximity to free margins a full thickness wedge repair was deemed most appropriate.  Using a sterile surgical marker, the appropriate repair was drawn incorporating the defect and placing the expected incisions perpendicular to the vermilion border.  The vermilion border was also meticulously outlined to ensure appropriate reapproximation during the repair.  The area thus outlined was incised through and through with a #15 scalpel blade.  The muscularis and dermis were reaproximated with deep sutures following hemostasis. Care was taken to realign the vermilion border before proceeding with the superficial closure.  Once the vermilion was realigned the superfical and mucosal closure was finished.

## 2021-06-18 NOTE — ED STATDOCS - DISCHARGE DATE
See orders.  CPM. See Meds list. I gave Rx refills.  # Td booster Tenivac given IM deltoid.  Side-effects discussed. Tylenol prn fever.  Well-tolerated.  # Discussed healthy diet & lifestyle.  1800 yin ADA diet. Low-salt, low-fat diet.   recommended the Mediterranean diet.  Increase physical activity./ Exercise 30 min./day or more.  Eat well-balanced meals  on time.  Eat whole grains.  Eat fish 3x/week or more. Do not tejada. Better to boil or bake.  Extra-virgin olive oil 1 tsp./day mixed w/ food.  Sleep 8 hrs./night.  Use car seatbelts regularly.  Have CO & smoke detectors in the home.  # Low-purine diet discussed. Avoid eating meat, beans, peanuts, cashews, sardines.  # Discussed COVID-19 precautions.  RTC after 3 mos. Or earlier prn.    
17-Sep-2017

## 2021-06-21 PROBLEM — Z48.89 POSTOPERATIVE VISIT: Status: ACTIVE | Noted: 2021-06-21

## 2021-06-22 ENCOUNTER — APPOINTMENT (OUTPATIENT)
Dept: GYNECOLOGIC ONCOLOGY | Facility: CLINIC | Age: 81
End: 2021-06-22
Payer: MEDICARE

## 2021-06-22 VITALS
SYSTOLIC BLOOD PRESSURE: 144 MMHG | HEART RATE: 64 BPM | HEIGHT: 62 IN | BODY MASS INDEX: 34.6 KG/M2 | DIASTOLIC BLOOD PRESSURE: 77 MMHG | WEIGHT: 188 LBS

## 2021-06-22 DIAGNOSIS — Z48.89 ENCOUNTER FOR OTHER SPECIFIED SURGICAL AFTERCARE: ICD-10-CM

## 2021-06-22 PROCEDURE — 99072 ADDL SUPL MATRL&STAF TM PHE: CPT

## 2021-06-22 PROCEDURE — 99213 OFFICE O/P EST LOW 20 MIN: CPT

## 2021-06-29 ENCOUNTER — APPOINTMENT (OUTPATIENT)
Dept: VASCULAR SURGERY | Facility: CLINIC | Age: 81
End: 2021-06-29

## 2021-07-15 ENCOUNTER — APPOINTMENT (OUTPATIENT)
Dept: VASCULAR SURGERY | Facility: CLINIC | Age: 81
End: 2021-07-15
Payer: MEDICARE

## 2021-07-15 VITALS
OXYGEN SATURATION: 98 % | HEART RATE: 73 BPM | TEMPERATURE: 98.2 F | DIASTOLIC BLOOD PRESSURE: 75 MMHG | SYSTOLIC BLOOD PRESSURE: 151 MMHG

## 2021-07-15 DIAGNOSIS — M79.89 OTHER SPECIFIED SOFT TISSUE DISORDERS: ICD-10-CM

## 2021-07-15 PROCEDURE — 99203 OFFICE O/P NEW LOW 30 MIN: CPT

## 2021-07-15 PROCEDURE — 99072 ADDL SUPL MATRL&STAF TM PHE: CPT

## 2021-07-15 PROCEDURE — 93970 EXTREMITY STUDY: CPT

## 2021-07-22 NOTE — HISTORY OF PRESENT ILLNESS
[FreeTextEntry1] : 80-year-old female with past medical history of hypertension, hypercholesterolemia, and endometrial cancer presenting for evaluation of bilateral lower extremity swelling and abnormal Quanta flow study at home by a visiting nurse.  Patient denies claudication, rest pain, toe discoloration or nonhealing wounds.  Denies prior history of DVT, SVT.  Her endometrial cancer is currently in remission.  She reports occasional discomfort upon standing for long periods of time of the right lower extremity but is able to perform activities of daily living without restriction. She takes amlodipine for HTN.

## 2021-07-22 NOTE — ASSESSMENT
[FreeTextEntry1] : 80-year-old female with past medical history of hypertension, hypercholesterolemia, and endometrial cancer presenting for evaluation of bilateral lower extremity swelling and abnormal Quanta flow study at home by a visiting nurse. \par \par Venous duplex performed in office demonstrates +right LE GSV reflux >0.5sec. No DVT or SVT [Arterial/Venous Disease] : arterial/venous disease [Foot care/Footwear] : foot care/footwear

## 2021-07-22 NOTE — PHYSICAL EXAM
[Normal Rate and Rhythm] : normal rate and rhythm [2+] : left 2+ [Ankle Swelling (On Exam)] : present [Ankle Swelling Bilaterally] : bilaterally  [Ankle Swelling On The Right] : mild [Varicose Veins Of Lower Extremities] : not present [] : not present [No Rash or Lesion] : No rash or lesion [Alert] : alert [Oriented to Person] : oriented to person [Oriented to Place] : oriented to place [Oriented to Time] : oriented to time [Calm] : calm [de-identified] : NAD [de-identified] : supple, no masses [de-identified] : unlabored breathing [de-identified] : soft, NT, ND, no pulsatile mass [de-identified] : FROM of all 4 extremities\par

## 2021-10-19 ENCOUNTER — NON-APPOINTMENT (OUTPATIENT)
Age: 81
End: 2021-10-19

## 2021-10-19 ENCOUNTER — APPOINTMENT (OUTPATIENT)
Dept: GYNECOLOGIC ONCOLOGY | Facility: CLINIC | Age: 81
End: 2021-10-19

## 2021-11-01 PROBLEM — Z08 ENCOUNTER FOR FOLLOW-UP SURVEILLANCE OF UTERINE CANCER: Status: ACTIVE | Noted: 2021-10-18

## 2021-11-02 ENCOUNTER — APPOINTMENT (OUTPATIENT)
Dept: GYNECOLOGIC ONCOLOGY | Facility: CLINIC | Age: 81
End: 2021-11-02
Payer: MEDICARE

## 2021-11-02 VITALS
HEIGHT: 62 IN | HEART RATE: 79 BPM | DIASTOLIC BLOOD PRESSURE: 76 MMHG | WEIGHT: 189 LBS | BODY MASS INDEX: 34.78 KG/M2 | SYSTOLIC BLOOD PRESSURE: 164 MMHG

## 2021-11-02 DIAGNOSIS — Z08 ENCOUNTER FOR FOLLOW-UP EXAMINATION AFTER COMPLETED TREATMENT FOR MALIGNANT NEOPLASM: ICD-10-CM

## 2021-11-02 DIAGNOSIS — Z85.42 ENCOUNTER FOR FOLLOW-UP EXAMINATION AFTER COMPLETED TREATMENT FOR MALIGNANT NEOPLASM: ICD-10-CM

## 2021-11-02 PROCEDURE — 99213 OFFICE O/P EST LOW 20 MIN: CPT

## 2021-11-04 ENCOUNTER — TRANSCRIPTION ENCOUNTER (OUTPATIENT)
Age: 81
End: 2021-11-04

## 2021-11-06 ENCOUNTER — EMERGENCY (EMERGENCY)
Facility: HOSPITAL | Age: 81
LOS: 0 days | Discharge: HOME | End: 2021-11-06
Payer: MEDICARE

## 2021-11-06 VITALS
SYSTOLIC BLOOD PRESSURE: 118 MMHG | OXYGEN SATURATION: 100 % | TEMPERATURE: 98 F | DIASTOLIC BLOOD PRESSURE: 59 MMHG | HEART RATE: 60 BPM | RESPIRATION RATE: 18 BRPM

## 2021-11-06 VITALS — HEIGHT: 62 IN

## 2021-11-06 DIAGNOSIS — Z79.82 LONG TERM (CURRENT) USE OF ASPIRIN: ICD-10-CM

## 2021-11-06 DIAGNOSIS — Z98.89 OTHER SPECIFIED POSTPROCEDURAL STATES: Chronic | ICD-10-CM

## 2021-11-06 DIAGNOSIS — U07.1 COVID-19: ICD-10-CM

## 2021-11-06 DIAGNOSIS — Z79.02 LONG TERM (CURRENT) USE OF ANTITHROMBOTICS/ANTIPLATELETS: ICD-10-CM

## 2021-11-06 DIAGNOSIS — Z98.49 CATARACT EXTRACTION STATUS, UNSPECIFIED EYE: Chronic | ICD-10-CM

## 2021-11-06 PROCEDURE — M0243: CPT

## 2021-11-06 PROCEDURE — L9998: CPT

## 2021-11-06 RX ORDER — SODIUM CHLORIDE 9 MG/ML
250 INJECTION INTRAMUSCULAR; INTRAVENOUS; SUBCUTANEOUS
Refills: 0 | Status: COMPLETED | OUTPATIENT
Start: 2021-11-06 | End: 2021-11-06

## 2021-11-06 RX ADMIN — SODIUM CHLORIDE 310 MILLILITER(S): 9 INJECTION INTRAMUSCULAR; INTRAVENOUS; SUBCUTANEOUS at 12:06

## 2021-11-06 RX ADMIN — SODIUM CHLORIDE 250 MILLILITER(S): 9 INJECTION INTRAMUSCULAR; INTRAVENOUS; SUBCUTANEOUS at 12:06

## 2021-11-06 NOTE — ED STATDOCS - CLINICAL SUMMARY MEDICAL DECISION MAKING FREE TEXT BOX
Patient has history of COVID-19 symptoms for [8  ] days    Patient has high risk factors that include:  [  ] Chronic Kidney Disease [  ] Diabetes Mellitus [  ] Immune Suppressive [  ] Receiving Immunosuppressive Treatment [  X] Overweight/Obesity BMI greater than 25 [X  ] Age greater than or equal 65 years [  ] Cardiovascular Disease [ X ] HTN [  ] COPD/Other Chronic Respiratory Disease [  ] Sickle Cell Disease [  ] Congenital/Acquired Heart Disease [  ] Neurodevelopmental Disorder [  ] Medical related technology dependence [  ] Asthma/Chronic Respiratory Disease [  ] Immune Suppressive Disease [  ] Receiving Immune Suppressive Therapy [  ] Other:     Patient received prior COVID treatment that included [  ]  Patient provided explanation of monoclonal antibody infusion and is in agreement with treatment plan. See consent form in medical record. Patient has history of COVID-19 symptoms for [8  ] days    Patient has high risk factors that include:  [  ] Chronic Kidney Disease [  ] Diabetes Mellitus [  ] Immune Suppressive [  ] Receiving Immunosuppressive Treatment [  X] Overweight/Obesity BMI greater than 25 [X  ] Age greater than or equal 65 years [  ] Cardiovascular Disease [ X ] HTN [  ] COPD/Other Chronic Respiratory Disease [  ] Sickle Cell Disease [  ] Congenital/Acquired Heart Disease [  ] Neurodevelopmental Disorder [  ] Medical related technology dependence [  ] Asthma/Chronic Respiratory Disease [  ] Immune Suppressive Disease [  ] Receiving Immune Suppressive Therapy [  ] Other:     Patient received prior COVID treatment that included [  ]  Patient provided explanation of monoclonal antibody infusion and is in agreement with treatment plan. See consent form in medical record.  [X  ] Patient received monoclonal antibody infusion with no adverse reaction. Patient planned for discharge home and follow up with outpatient [facility] program.    [  ] Patient received monoclonal antibody infusion and experienced an adverse reaction. Symptoms include [  ]. Patient treated with [  ]. Patient planned for [  ]

## 2021-11-06 NOTE — ED STATDOCS - PATIENT PORTAL LINK FT
You can access the FollowMyHealth Patient Portal offered by [facility] by registering at the following website: [email or web address]. By joining [facility]s FollowMyHealth portal, you will also be able to view your health information using other applications (apps) compatible with our system.

## 2021-11-06 NOTE — ED STATDOCS - NSFOLLOWUPINSTRUCTIONS_ED_ALL_ED_FT
COVID-19 (Coronavirus Disease 2019)    WHAT YOU NEED TO KNOW:    COVID-19 is the disease caused by a coronavirus first discovered in December 2019. Coronaviruses generally cause upper respiratory (nose, throat, and lung) infections, such as a cold. The 2019 virus spreads quickly and easily. It can be spread starting 2 to 3 days before symptoms even begin.    DISCHARGE INSTRUCTIONS:    Call your local emergency number (911 in the ) if:   •You have trouble breathing or shortness of breath at rest.      •You have chest pain or pressure that lasts longer than 5 minutes.      •You become confused or hard to wake.      •Your lips or face are blue.      Return to the emergency department if:   •You have a fever of 104°F (40°C) or higher.          Call your doctor if:   •You have symptoms of COVID-19.      •You have questions or concerns about your condition or care.      Medicines: You may need any of the following:   •Decongestants help reduce nasal congestion and help you breathe more easily. If you take decongestant pills, they may make you feel restless or cause problems with your sleep. Do not use decongestant sprays for more than a few days.      •Cough suppressants help reduce coughing. Ask your healthcare provider which type of cough medicine is best for you.      •Acetaminophen decreases pain and fever. It is available without a doctor's order. Ask how much to take and how often to take it. Follow directions. Read the labels of all other medicines you are using to see if they also contain acetaminophen, or ask your doctor or pharmacist. Acetaminophen can cause liver damage if not taken correctly. Do not use more than 4 grams (4,000 milligrams) total of acetaminophen in one day.       •NSAIDs, such as ibuprofen, help decrease swelling, pain, and fever. This medicine is available with or without a doctor's order. NSAIDs can cause stomach bleeding or kidney problems in certain people. If you take blood thinner medicine, always ask your healthcare provider if NSAIDs are safe for you. Always read the medicine label and follow directions.      •Blood thinners help prevent blood clots. Clots can cause strokes, heart attacks, and death. The following are general safety guidelines to follow while you are taking a blood thinner:?Watch for bleeding and bruising while you take blood thinners. Watch for bleeding from your gums or nose. Watch for blood in your urine and bowel movements. Use a soft washcloth on your skin, and a soft toothbrush to brush your teeth. This can keep your skin and gums from bleeding. If you shave, use an electric shaver. Do not play contact sports.       ?Tell your dentist and other healthcare providers that you take a blood thinner. Wear a bracelet or necklace that says you take this medicine.       ?Do not start or stop any other medicines unless your healthcare provider tells you to. Many medicines cannot be used with blood thinners.      ?Take your blood thinner exactly as prescribed by your healthcare provider. Do not skip does or take less than prescribed. Tell your provider right away if you forget to take your blood thinner, or if you take too much.      ?Warfarin is a blood thinner that you may need to take. The following are things you should be aware of if you take warfarin: ?Foods and medicines can affect the amount of warfarin in your blood. Do not make major changes to your diet while you take warfarin. Warfarin works best when you eat about the same amount of vitamin K every day. Vitamin K is found in green leafy vegetables and certain other foods. Ask for more information about what to eat when you are taking warfarin.      ?You will need to see your healthcare provider for follow-up visits when you are on warfarin. You will need regular blood tests. These tests are used to decide how much medicine you need.         •Take your medicine as directed. Contact your healthcare provider if you think your medicine is not helping or if you have side effects. Tell him or her if you are allergic to any medicine. Keep a list of the medicines, vitamins, and herbs you take. Include the amounts, and when and why you take them. Bring the list or the pill bottles to follow-up visits. Carry your medicine list with you in case of an emergency.      What you need to know about variants: The virus has changed into several new forms (called variants) since it was discovered. The variants may be more contagious (easily spread) than the original form. Some may also cause more severe illness than others.    What you need to know about COVID-19 vaccines: Healthcare providers recommend a COVID-19 vaccine, even if you have already had COVID-19. You are considered fully vaccinated against COVID-19 two weeks after the final dose of any COVID-19 vaccine. Let your healthcare provider know when you have received the final dose of the vaccine. Make a copy of your vaccination card. Keep the original with you in case you need to show it. Keep the copy in a safe place.  •COVID-19 vaccines are given as a shot in 1 or 2 doses. The vaccine is recommended for everyone 12 years or older.      •A third dose is recommended for adults with a weakened immune system who get a 2-dose vaccine. The third dose is given at least 28 days after the second.      •A booster (additional) dose is being recommended for other people as well. This is usually given 6 months after the initial doses are complete. Continue social distancing and other measures, even after you get the vaccine. Although it is not common, you can become infected after you get the vaccine. You may also be able to pass the virus to others without knowing you are infected.      •After you get the vaccine, check local, national, and international travel rules. You may need to be tested before you travel. Some countries require proof of a negative test before you travel. You may also need to quarantine after you return.      How the 2019 coronavirus spreads:   •Droplets are the main way all coronaviruses spread. The virus travels in droplets that form when a person talks, sings, coughs, or sneezes. The droplets can also float in the air for minutes or hours. Infection happens when you breathe in the droplets or get them in your eyes or nose. Close personal contact with an infected person increases your risk for infection. This means being within 6 feet (2 meters) of the person for at least 15 minutes over 24 hours.      •Person-to-person contact can spread the virus. For example, a person with the virus on his or her hands can spread it by shaking hands with someone.      •The virus can stay on objects and surfaces for up to 3 days. You may become infected by touching the object or surface and then touching your eyes or mouth.      Help lower the risk for COVID-19:   •Wash your hands often throughout the day. Use soap and water. Rub your soapy hands together, lacing your fingers, for at least 20 seconds. Rinse with warm, running water. Dry your hands with a clean towel or paper towel. Use hand  that contains alcohol if soap and water are not available. Teach children how to wash their hands and use hand .  Handwashing           •Cover sneezes and coughs. Turn your face away and cover your mouth and nose with a tissue. Throw the tissue away. Use the bend of your arm if a tissue is not available. Then wash your hands well with soap and water or use hand . Teach children how to cover a cough or sneeze.      •Wear a face covering (mask) when needed. Use a cloth covering with at least 2 layers. You can also create layers by putting a cloth covering over a disposable non-medical mask. Cover your mouth and your nose.  How to Wear a Face Covering (Mask)           •Follow worldwide, national, and local social distancing guidelines. Keep at least 6 feet (2 meters) between you and others.      •Try not to touch your face. If you get the virus on your hands, you can transfer it to your eyes, nose, or mouth and become infected. You can also transfer it to objects, surfaces, or people.      •Clean and disinfect high-touch surfaces and objects often. Use disinfecting wipes, or make a solution of 4 teaspoons of bleach in 1 quart (4 cups) of water.      •Ask about other vaccines you may need. Get the influenza (flu) vaccine as soon as recommended each year, usually starting in September or October. Get the pneumonia vaccine if recommended. Your healthcare provider can tell you if you should also get other vaccines, and when to get them.    Prevent COVID-19 Infection         Follow social distancing guidelines: National and local social distancing rules vary. Rules and restrictions may change over time as restrictions are lifted. The following are general guidelines:   •Stay home if you are sick or think you may have COVID-19. It is important to stay home if you are waiting for a testing appointment or for test results.      •Avoid close physical contact with anyone who does not live in your home. Do not shake hands with, hug, or kiss a person as a greeting. If you must use public transportation (such as a bus or subway), try to sit or stand away from others. Wear your face covering.      •Avoid in-person gatherings and crowds. Attend virtually if possible.      For more information:   •Centers for Disease Control and Prevention  [address] [place] [address]  Phone: 1-565.497.1245  Web Address: http://www.cdc.gov        Follow up with your doctor as directed: Write down your questions so you remember to ask them during your visits.

## 2021-11-06 NOTE — ED STATDOCS - NS ED ROS FT
ROS: Constitutional- +fever, +chills.  Respiratory- -cough, -SOB  Cardiac- no chest pain, no palpitations, ENT- +rhinorrhea, +sore throat, no congestion.  Abdomen- No nausea, no vomiting, no diarrhea.  Urinary- no dysuria, no urgency, no frequency.  Skin- No rashes

## 2021-11-06 NOTE — ED STATDOCS - OBJECTIVE STATEMENT
81 yr. old female presents to ED for MAB infusion after testing positive for COVID at [facility] in [place]. Symptoms started on 10/30 /21 with sore trhroat ,headache ,fever, runny nose. No chest pain or SOB. Had Modrna Vaccine.

## 2021-11-07 ENCOUNTER — TRANSCRIPTION ENCOUNTER (OUTPATIENT)
Age: 81
End: 2021-11-07

## 2021-12-24 NOTE — ED PROVIDER NOTE - NSCAREINITIATED _GEN_ER
-Blood work shows anemia and low iron levels.  Start iron supplement as discussed at office visit.  Recheck CBC and iron levels in 8 weeks.  -CMP, lipids and thyroid are normal  -Vitamin D is low-start vitamin D3 2000 IUs daily if not already taking
Yesi Oliver(Attending)

## 2022-02-08 ENCOUNTER — RESULT REVIEW (OUTPATIENT)
Age: 82
End: 2022-02-08

## 2022-02-08 ENCOUNTER — OUTPATIENT (OUTPATIENT)
Dept: OUTPATIENT SERVICES | Facility: HOSPITAL | Age: 82
LOS: 1 days | End: 2022-02-08
Payer: MEDICARE

## 2022-02-08 ENCOUNTER — APPOINTMENT (OUTPATIENT)
Dept: MAMMOGRAPHY | Facility: CLINIC | Age: 82
End: 2022-02-08
Payer: MEDICARE

## 2022-02-08 DIAGNOSIS — Z98.89 OTHER SPECIFIED POSTPROCEDURAL STATES: Chronic | ICD-10-CM

## 2022-02-08 DIAGNOSIS — Z00.00 ENCOUNTER FOR GENERAL ADULT MEDICAL EXAMINATION WITHOUT ABNORMAL FINDINGS: ICD-10-CM

## 2022-02-08 DIAGNOSIS — Z98.49 CATARACT EXTRACTION STATUS, UNSPECIFIED EYE: Chronic | ICD-10-CM

## 2022-02-08 PROCEDURE — 77067 SCR MAMMO BI INCL CAD: CPT

## 2022-02-08 PROCEDURE — 77067 SCR MAMMO BI INCL CAD: CPT | Mod: 26

## 2022-02-08 PROCEDURE — 77063 BREAST TOMOSYNTHESIS BI: CPT

## 2022-02-08 PROCEDURE — 77063 BREAST TOMOSYNTHESIS BI: CPT | Mod: 26

## 2022-02-19 ENCOUNTER — EMERGENCY (EMERGENCY)
Facility: HOSPITAL | Age: 82
LOS: 0 days | Discharge: HOME | End: 2022-02-20
Attending: HOSPITALIST
Payer: MEDICARE

## 2022-02-19 VITALS
TEMPERATURE: 98 F | DIASTOLIC BLOOD PRESSURE: 60 MMHG | OXYGEN SATURATION: 98 % | RESPIRATION RATE: 17 BRPM | HEIGHT: 60 IN | WEIGHT: 199.96 LBS | SYSTOLIC BLOOD PRESSURE: 161 MMHG | HEART RATE: 94 BPM

## 2022-02-19 DIAGNOSIS — R07.89 OTHER CHEST PAIN: ICD-10-CM

## 2022-02-19 DIAGNOSIS — Z98.49 CATARACT EXTRACTION STATUS, UNSPECIFIED EYE: Chronic | ICD-10-CM

## 2022-02-19 DIAGNOSIS — Z79.82 LONG TERM (CURRENT) USE OF ASPIRIN: ICD-10-CM

## 2022-02-19 DIAGNOSIS — I10 ESSENTIAL (PRIMARY) HYPERTENSION: ICD-10-CM

## 2022-02-19 DIAGNOSIS — Z98.89 OTHER SPECIFIED POSTPROCEDURAL STATES: Chronic | ICD-10-CM

## 2022-02-19 DIAGNOSIS — Z20.822 CONTACT WITH AND (SUSPECTED) EXPOSURE TO COVID-19: ICD-10-CM

## 2022-02-19 DIAGNOSIS — R00.2 PALPITATIONS: ICD-10-CM

## 2022-02-19 DIAGNOSIS — E78.5 HYPERLIPIDEMIA, UNSPECIFIED: ICD-10-CM

## 2022-02-19 LAB
ALBUMIN SERPL ELPH-MCNC: 3.6 G/DL — SIGNIFICANT CHANGE UP (ref 3.3–5)
ALP SERPL-CCNC: 120 U/L — SIGNIFICANT CHANGE UP (ref 40–120)
ALT FLD-CCNC: 22 U/L — SIGNIFICANT CHANGE UP (ref 12–78)
ANION GAP SERPL CALC-SCNC: 8 MMOL/L — SIGNIFICANT CHANGE UP (ref 5–17)
AST SERPL-CCNC: 22 U/L — SIGNIFICANT CHANGE UP (ref 15–37)
BASOPHILS # BLD AUTO: 0.02 K/UL — SIGNIFICANT CHANGE UP (ref 0–0.2)
BASOPHILS NFR BLD AUTO: 0.2 % — SIGNIFICANT CHANGE UP (ref 0–2)
BILIRUB SERPL-MCNC: 0.3 MG/DL — SIGNIFICANT CHANGE UP (ref 0.2–1.2)
BUN SERPL-MCNC: 17 MG/DL — SIGNIFICANT CHANGE UP (ref 7–23)
CALCIUM SERPL-MCNC: 9.6 MG/DL — SIGNIFICANT CHANGE UP (ref 8.5–10.1)
CHLORIDE SERPL-SCNC: 108 MMOL/L — SIGNIFICANT CHANGE UP (ref 96–108)
CO2 SERPL-SCNC: 25 MMOL/L — SIGNIFICANT CHANGE UP (ref 22–31)
CREAT SERPL-MCNC: 0.99 MG/DL — SIGNIFICANT CHANGE UP (ref 0.5–1.3)
EOSINOPHIL # BLD AUTO: 0.08 K/UL — SIGNIFICANT CHANGE UP (ref 0–0.5)
EOSINOPHIL NFR BLD AUTO: 0.8 % — SIGNIFICANT CHANGE UP (ref 0–6)
FLUAV AG NPH QL: SIGNIFICANT CHANGE UP
FLUBV AG NPH QL: SIGNIFICANT CHANGE UP
GLUCOSE SERPL-MCNC: 115 MG/DL — HIGH (ref 70–99)
HCT VFR BLD CALC: 39.2 % — SIGNIFICANT CHANGE UP (ref 34.5–45)
HGB BLD-MCNC: 12.9 G/DL — SIGNIFICANT CHANGE UP (ref 11.5–15.5)
IMM GRANULOCYTES NFR BLD AUTO: 0.3 % — SIGNIFICANT CHANGE UP (ref 0–1.5)
LYMPHOCYTES # BLD AUTO: 4.65 K/UL — HIGH (ref 1–3.3)
LYMPHOCYTES # BLD AUTO: 44.5 % — HIGH (ref 13–44)
MCHC RBC-ENTMCNC: 31.5 PG — SIGNIFICANT CHANGE UP (ref 27–34)
MCHC RBC-ENTMCNC: 32.9 GM/DL — SIGNIFICANT CHANGE UP (ref 32–36)
MCV RBC AUTO: 95.8 FL — SIGNIFICANT CHANGE UP (ref 80–100)
MONOCYTES # BLD AUTO: 0.79 K/UL — SIGNIFICANT CHANGE UP (ref 0–0.9)
MONOCYTES NFR BLD AUTO: 7.6 % — SIGNIFICANT CHANGE UP (ref 2–14)
NEUTROPHILS # BLD AUTO: 4.87 K/UL — SIGNIFICANT CHANGE UP (ref 1.8–7.4)
NEUTROPHILS NFR BLD AUTO: 46.6 % — SIGNIFICANT CHANGE UP (ref 43–77)
PLATELET # BLD AUTO: 267 K/UL — SIGNIFICANT CHANGE UP (ref 150–400)
POTASSIUM SERPL-MCNC: 3.2 MMOL/L — LOW (ref 3.5–5.3)
POTASSIUM SERPL-SCNC: 3.2 MMOL/L — LOW (ref 3.5–5.3)
PROT SERPL-MCNC: 8.3 GM/DL — SIGNIFICANT CHANGE UP (ref 6–8.3)
RBC # BLD: 4.09 M/UL — SIGNIFICANT CHANGE UP (ref 3.8–5.2)
RBC # FLD: 13.5 % — SIGNIFICANT CHANGE UP (ref 10.3–14.5)
RSV RNA NPH QL NAA+NON-PROBE: SIGNIFICANT CHANGE UP
SARS-COV-2 RNA SPEC QL NAA+PROBE: SIGNIFICANT CHANGE UP
SODIUM SERPL-SCNC: 141 MMOL/L — SIGNIFICANT CHANGE UP (ref 135–145)
TROPONIN I, HIGH SENSITIVITY RESULT: 6.76 NG/L — SIGNIFICANT CHANGE UP
WBC # BLD: 10.44 K/UL — SIGNIFICANT CHANGE UP (ref 3.8–10.5)
WBC # FLD AUTO: 10.44 K/UL — SIGNIFICANT CHANGE UP (ref 3.8–10.5)

## 2022-02-19 PROCEDURE — 93005 ELECTROCARDIOGRAM TRACING: CPT

## 2022-02-19 PROCEDURE — 71045 X-RAY EXAM CHEST 1 VIEW: CPT | Mod: 26

## 2022-02-19 PROCEDURE — 84484 ASSAY OF TROPONIN QUANT: CPT

## 2022-02-19 PROCEDURE — 71045 X-RAY EXAM CHEST 1 VIEW: CPT

## 2022-02-19 PROCEDURE — 99285 EMERGENCY DEPT VISIT HI MDM: CPT

## 2022-02-19 PROCEDURE — 0241U: CPT

## 2022-02-19 PROCEDURE — 80053 COMPREHEN METABOLIC PANEL: CPT

## 2022-02-19 PROCEDURE — 84132 ASSAY OF SERUM POTASSIUM: CPT

## 2022-02-19 PROCEDURE — 36415 COLL VENOUS BLD VENIPUNCTURE: CPT

## 2022-02-19 PROCEDURE — 85025 COMPLETE CBC W/AUTO DIFF WBC: CPT

## 2022-02-19 PROCEDURE — 93010 ELECTROCARDIOGRAM REPORT: CPT

## 2022-02-19 PROCEDURE — 99285 EMERGENCY DEPT VISIT HI MDM: CPT | Mod: 25

## 2022-02-19 RX ORDER — ASPIRIN/CALCIUM CARB/MAGNESIUM 324 MG
324 TABLET ORAL ONCE
Refills: 0 | Status: DISCONTINUED | OUTPATIENT
Start: 2022-02-19 | End: 2022-02-19

## 2022-02-19 NOTE — ED ADULT TRIAGE NOTE - CHIEF COMPLAINT QUOTE
patient bibems from home c/o midsternal chest pain (9/10). patient is also hypertensive upon arrival to ED. a/o4. no cardiac history as per ems

## 2022-02-20 VITALS
RESPIRATION RATE: 18 BRPM | SYSTOLIC BLOOD PRESSURE: 128 MMHG | TEMPERATURE: 98 F | OXYGEN SATURATION: 100 % | HEART RATE: 62 BPM | DIASTOLIC BLOOD PRESSURE: 60 MMHG

## 2022-02-20 LAB
ADD ON TEST-SPECIMEN IN LAB: SIGNIFICANT CHANGE UP
TROPONIN I, HIGH SENSITIVITY RESULT: 10.16 NG/L — SIGNIFICANT CHANGE UP

## 2022-02-20 RX ORDER — POTASSIUM CHLORIDE 20 MEQ
40 PACKET (EA) ORAL ONCE
Refills: 0 | Status: COMPLETED | OUTPATIENT
Start: 2022-02-20 | End: 2022-02-20

## 2022-02-20 RX ADMIN — Medication 40 MILLIEQUIVALENT(S): at 01:10

## 2022-02-20 NOTE — ED PROVIDER NOTE - PATIENT PORTAL LINK FT
You can access the FollowMyHealth Patient Portal offered by [facility] by registering at the following website: [email or web address]. By joining [facility]’s FollowMyHealth portal, you will also be able to view your health information using other applications (apps) compatible with our system.

## 2022-02-20 NOTE — ED PROVIDER NOTE - OBJECTIVE STATEMENT
81F with hx of HTN and HLD p/w CP and palpitations since this evening. CP described as a slight squeezing sensation. patient states pain has subsided a little but initially it was the middle of her chest. took 4 ASA pta. no cough or sob. had a stress test 1 year ago which was normal per patient.

## 2022-02-20 NOTE — ED PROVIDER NOTE - PROGRESS NOTE DETAILS
Keyanna DUBOSE: feeling better, wishes to dc home. will f/u with her doctor this coming week or return if she has any recurrence of CP. will dc.

## 2022-02-20 NOTE — ED ADULT NURSE REASSESSMENT NOTE - NS ED NURSE REASSESS COMMENT FT1
Received pt from prior RN, pt is a&0x4, airway patent, does not show any s/s of respiratory distress, breathing is unlabored, color is culturally appropriate, vs are WNL, cardiac monitor reads NS at this time, pt denies SOB and chest pain

## 2022-03-17 ENCOUNTER — APPOINTMENT (OUTPATIENT)
Dept: MAMMOGRAPHY | Facility: CLINIC | Age: 82
End: 2022-03-17
Payer: MEDICARE

## 2022-03-17 ENCOUNTER — RESULT REVIEW (OUTPATIENT)
Age: 82
End: 2022-03-17

## 2022-03-17 ENCOUNTER — APPOINTMENT (OUTPATIENT)
Dept: ULTRASOUND IMAGING | Facility: CLINIC | Age: 82
End: 2022-03-17
Payer: MEDICARE

## 2022-03-17 ENCOUNTER — OUTPATIENT (OUTPATIENT)
Dept: OUTPATIENT SERVICES | Facility: HOSPITAL | Age: 82
LOS: 1 days | End: 2022-03-17
Payer: MEDICARE

## 2022-03-17 DIAGNOSIS — Z00.8 ENCOUNTER FOR OTHER GENERAL EXAMINATION: ICD-10-CM

## 2022-03-17 DIAGNOSIS — Z98.49 CATARACT EXTRACTION STATUS, UNSPECIFIED EYE: Chronic | ICD-10-CM

## 2022-03-17 DIAGNOSIS — Z98.89 OTHER SPECIFIED POSTPROCEDURAL STATES: Chronic | ICD-10-CM

## 2022-03-17 PROCEDURE — 77065 DX MAMMO INCL CAD UNI: CPT | Mod: 26,LT

## 2022-03-17 PROCEDURE — G0279: CPT | Mod: 26

## 2022-03-17 PROCEDURE — 76641 ULTRASOUND BREAST COMPLETE: CPT

## 2022-03-17 PROCEDURE — G0279: CPT

## 2022-03-17 PROCEDURE — 77065 DX MAMMO INCL CAD UNI: CPT

## 2022-03-17 PROCEDURE — 76641 ULTRASOUND BREAST COMPLETE: CPT | Mod: 26,LT

## 2022-03-21 ENCOUNTER — NON-APPOINTMENT (OUTPATIENT)
Age: 82
End: 2022-03-21

## 2022-03-29 ENCOUNTER — APPOINTMENT (OUTPATIENT)
Dept: GYNECOLOGIC ONCOLOGY | Facility: CLINIC | Age: 82
End: 2022-03-29
Payer: MEDICARE

## 2022-03-29 VITALS
DIASTOLIC BLOOD PRESSURE: 76 MMHG | SYSTOLIC BLOOD PRESSURE: 162 MMHG | HEIGHT: 64 IN | HEART RATE: 79 BPM | BODY MASS INDEX: 31.41 KG/M2 | WEIGHT: 184 LBS

## 2022-03-29 DIAGNOSIS — R87.619 UNSPECIFIED ABNORMAL CYTOLOGICAL FINDINGS IN SPECIMENS FROM CERVIX UTERI: ICD-10-CM

## 2022-03-29 PROCEDURE — 99213 OFFICE O/P EST LOW 20 MIN: CPT

## 2022-04-18 NOTE — ED PROVIDER NOTE - NS ED MD DISPO OBSERVATION SRV
OBV Cartilage Graft Text: The defect edges were debeveled with a #15 scalpel blade.  Given the location of the defect, shape of the defect, the fact the defect involved a full thickness cartilage defect a cartilage graft was deemed most appropriate.  An appropriate donor site was identified, cleansed, and anesthetized. The cartilage graft was then harvested and transferred to the recipient site, oriented appropriately and then sutured into place.  The secondary defect was then repaired using a primary closure.

## 2022-06-21 ENCOUNTER — APPOINTMENT (OUTPATIENT)
Dept: GYNECOLOGIC ONCOLOGY | Facility: CLINIC | Age: 82
End: 2022-06-21

## 2022-08-02 ENCOUNTER — APPOINTMENT (OUTPATIENT)
Dept: GYNECOLOGIC ONCOLOGY | Facility: CLINIC | Age: 82
End: 2022-08-02

## 2022-08-02 VITALS
HEART RATE: 66 BPM | BODY MASS INDEX: 30.9 KG/M2 | DIASTOLIC BLOOD PRESSURE: 78 MMHG | SYSTOLIC BLOOD PRESSURE: 144 MMHG | HEIGHT: 64 IN | WEIGHT: 181 LBS

## 2022-08-02 PROCEDURE — 99213 OFFICE O/P EST LOW 20 MIN: CPT

## 2022-10-10 ENCOUNTER — INPATIENT (INPATIENT)
Facility: HOSPITAL | Age: 82
LOS: 1 days | Discharge: HOME HEALTH | DRG: 552 | End: 2022-10-12
Attending: HOSPITALIST | Admitting: INTERNAL MEDICINE
Payer: MEDICARE

## 2022-10-10 VITALS — WEIGHT: 188.94 LBS | HEIGHT: 60 IN

## 2022-10-10 DIAGNOSIS — Z98.49 CATARACT EXTRACTION STATUS, UNSPECIFIED EYE: Chronic | ICD-10-CM

## 2022-10-10 DIAGNOSIS — Z98.89 OTHER SPECIFIED POSTPROCEDURAL STATES: Chronic | ICD-10-CM

## 2022-10-10 PROCEDURE — 99285 EMERGENCY DEPT VISIT HI MDM: CPT

## 2022-10-10 RX ORDER — MORPHINE SULFATE 50 MG/1
4 CAPSULE, EXTENDED RELEASE ORAL ONCE
Refills: 0 | Status: DISCONTINUED | OUTPATIENT
Start: 2022-10-10 | End: 2022-10-10

## 2022-10-10 RX ORDER — SODIUM CHLORIDE 9 MG/ML
1000 INJECTION INTRAMUSCULAR; INTRAVENOUS; SUBCUTANEOUS ONCE
Refills: 0 | Status: COMPLETED | OUTPATIENT
Start: 2022-10-10 | End: 2022-10-10

## 2022-10-10 RX ORDER — ONDANSETRON 8 MG/1
4 TABLET, FILM COATED ORAL ONCE
Refills: 0 | Status: COMPLETED | OUTPATIENT
Start: 2022-10-10 | End: 2022-10-10

## 2022-10-10 RX ADMIN — ONDANSETRON 4 MILLIGRAM(S): 8 TABLET, FILM COATED ORAL at 23:50

## 2022-10-10 RX ADMIN — MORPHINE SULFATE 4 MILLIGRAM(S): 50 CAPSULE, EXTENDED RELEASE ORAL at 23:50

## 2022-10-10 RX ADMIN — SODIUM CHLORIDE 2000 MILLILITER(S): 9 INJECTION INTRAMUSCULAR; INTRAVENOUS; SUBCUTANEOUS at 23:50

## 2022-10-10 NOTE — ED ADULT TRIAGE NOTE - CHIEF COMPLAINT QUOTE
Pt presents to the ED with c/o groin/back pain radiating down left leg. Denies injury/trauma. Has been getting worse x 4 days. Tylenol at home without relief.

## 2022-10-10 NOTE — ED STATDOCS - PROGRESS NOTE DETAILS
Pt is an 83 y/o F with a h/o HTN, HPL presenting with multiple complaints, she started having left sided back pain that radiates to her left groin and she reports down her leg. She also states she feels cramps in both her legs. In addition, reports her left arm started bothering her today with some heaviness to her chest. She denied SOB. Will send to [facility] for further evaluation.

## 2022-10-11 ENCOUNTER — TRANSCRIPTION ENCOUNTER (OUTPATIENT)
Age: 82
End: 2022-10-11

## 2022-10-11 DIAGNOSIS — R07.9 CHEST PAIN, UNSPECIFIED: ICD-10-CM

## 2022-10-11 LAB
APPEARANCE UR: CLEAR — SIGNIFICANT CHANGE UP
BASOPHILS # BLD AUTO: 0.02 K/UL — SIGNIFICANT CHANGE UP (ref 0–0.2)
BASOPHILS NFR BLD AUTO: 0.2 % — SIGNIFICANT CHANGE UP (ref 0–2)
BILIRUB UR-MCNC: NEGATIVE — SIGNIFICANT CHANGE UP
COLOR SPEC: YELLOW — SIGNIFICANT CHANGE UP
DIFF PNL FLD: ABNORMAL
EOSINOPHIL # BLD AUTO: 0.07 K/UL — SIGNIFICANT CHANGE UP (ref 0–0.5)
EOSINOPHIL NFR BLD AUTO: 0.8 % — SIGNIFICANT CHANGE UP (ref 0–6)
FLUAV AG NPH QL: SIGNIFICANT CHANGE UP
FLUBV AG NPH QL: SIGNIFICANT CHANGE UP
GLUCOSE UR QL: NEGATIVE — SIGNIFICANT CHANGE UP
HCT VFR BLD CALC: 38.2 % — SIGNIFICANT CHANGE UP (ref 34.5–45)
HGB BLD-MCNC: 12.4 G/DL — SIGNIFICANT CHANGE UP (ref 11.5–15.5)
IMM GRANULOCYTES NFR BLD AUTO: 0.2 % — SIGNIFICANT CHANGE UP (ref 0–0.9)
KETONES UR-MCNC: NEGATIVE — SIGNIFICANT CHANGE UP
LEUKOCYTE ESTERASE UR-ACNC: NEGATIVE — SIGNIFICANT CHANGE UP
LYMPHOCYTES # BLD AUTO: 2.79 K/UL — SIGNIFICANT CHANGE UP (ref 1–3.3)
LYMPHOCYTES # BLD AUTO: 31.8 % — SIGNIFICANT CHANGE UP (ref 13–44)
MCHC RBC-ENTMCNC: 32 PG — SIGNIFICANT CHANGE UP (ref 27–34)
MCHC RBC-ENTMCNC: 32.5 GM/DL — SIGNIFICANT CHANGE UP (ref 32–36)
MCV RBC AUTO: 98.5 FL — SIGNIFICANT CHANGE UP (ref 80–100)
MONOCYTES # BLD AUTO: 0.69 K/UL — SIGNIFICANT CHANGE UP (ref 0–0.9)
MONOCYTES NFR BLD AUTO: 7.9 % — SIGNIFICANT CHANGE UP (ref 2–14)
NEUTROPHILS # BLD AUTO: 5.17 K/UL — SIGNIFICANT CHANGE UP (ref 1.8–7.4)
NEUTROPHILS NFR BLD AUTO: 59.1 % — SIGNIFICANT CHANGE UP (ref 43–77)
NITRITE UR-MCNC: NEGATIVE — SIGNIFICANT CHANGE UP
PH UR: 7 — SIGNIFICANT CHANGE UP (ref 5–8)
PLATELET # BLD AUTO: 252 K/UL — SIGNIFICANT CHANGE UP (ref 150–400)
PROT UR-MCNC: NEGATIVE — SIGNIFICANT CHANGE UP
RBC # BLD: 3.88 M/UL — SIGNIFICANT CHANGE UP (ref 3.8–5.2)
RBC # FLD: 13.7 % — SIGNIFICANT CHANGE UP (ref 10.3–14.5)
RSV RNA NPH QL NAA+NON-PROBE: SIGNIFICANT CHANGE UP
SARS-COV-2 RNA SPEC QL NAA+PROBE: SIGNIFICANT CHANGE UP
SP GR SPEC: 1 — LOW (ref 1.01–1.02)
UROBILINOGEN FLD QL: NEGATIVE — SIGNIFICANT CHANGE UP
WBC # BLD: 8.76 K/UL — SIGNIFICANT CHANGE UP (ref 3.8–10.5)
WBC # FLD AUTO: 8.76 K/UL — SIGNIFICANT CHANGE UP (ref 3.8–10.5)

## 2022-10-11 PROCEDURE — 74177 CT ABD & PELVIS W/CONTRAST: CPT | Mod: 26,MA

## 2022-10-11 PROCEDURE — 93005 ELECTROCARDIOGRAM TRACING: CPT

## 2022-10-11 PROCEDURE — 97162 PT EVAL MOD COMPLEX 30 MIN: CPT | Mod: GP

## 2022-10-11 PROCEDURE — 93970 EXTREMITY STUDY: CPT | Mod: 26

## 2022-10-11 PROCEDURE — 97116 GAIT TRAINING THERAPY: CPT | Mod: GP

## 2022-10-11 PROCEDURE — 71045 X-RAY EXAM CHEST 1 VIEW: CPT | Mod: 26

## 2022-10-11 PROCEDURE — 72148 MRI LUMBAR SPINE W/O DYE: CPT | Mod: 26

## 2022-10-11 PROCEDURE — 97530 THERAPEUTIC ACTIVITIES: CPT | Mod: GP

## 2022-10-11 PROCEDURE — 93010 ELECTROCARDIOGRAM REPORT: CPT

## 2022-10-11 PROCEDURE — 99220: CPT

## 2022-10-11 PROCEDURE — 72148 MRI LUMBAR SPINE W/O DYE: CPT

## 2022-10-11 RX ORDER — ENOXAPARIN SODIUM 100 MG/ML
40 INJECTION SUBCUTANEOUS EVERY 24 HOURS
Refills: 0 | Status: DISCONTINUED | OUTPATIENT
Start: 2022-10-11 | End: 2022-10-12

## 2022-10-11 RX ORDER — LIDOCAINE 4 G/100G
1 CREAM TOPICAL DAILY
Refills: 0 | Status: DISCONTINUED | OUTPATIENT
Start: 2022-10-11 | End: 2022-10-12

## 2022-10-11 RX ORDER — SIMVASTATIN 20 MG/1
10 TABLET, FILM COATED ORAL AT BEDTIME
Refills: 0 | Status: DISCONTINUED | OUTPATIENT
Start: 2022-10-11 | End: 2022-10-12

## 2022-10-11 RX ORDER — CYCLOBENZAPRINE HYDROCHLORIDE 10 MG/1
5 TABLET, FILM COATED ORAL EVERY 8 HOURS
Refills: 0 | Status: DISCONTINUED | OUTPATIENT
Start: 2022-10-11 | End: 2022-10-12

## 2022-10-11 RX ORDER — SENNA PLUS 8.6 MG/1
2 TABLET ORAL AT BEDTIME
Refills: 0 | Status: DISCONTINUED | OUTPATIENT
Start: 2022-10-11 | End: 2022-10-12

## 2022-10-11 RX ORDER — LANOLIN ALCOHOL/MO/W.PET/CERES
3 CREAM (GRAM) TOPICAL AT BEDTIME
Refills: 0 | Status: DISCONTINUED | OUTPATIENT
Start: 2022-10-11 | End: 2022-10-12

## 2022-10-11 RX ORDER — OXYCODONE HYDROCHLORIDE 5 MG/1
5 TABLET ORAL EVERY 4 HOURS
Refills: 0 | Status: DISCONTINUED | OUTPATIENT
Start: 2022-10-11 | End: 2022-10-12

## 2022-10-11 RX ORDER — ONDANSETRON 8 MG/1
4 TABLET, FILM COATED ORAL EVERY 8 HOURS
Refills: 0 | Status: DISCONTINUED | OUTPATIENT
Start: 2022-10-11 | End: 2022-10-12

## 2022-10-11 RX ORDER — POLYETHYLENE GLYCOL 3350 17 G/17G
17 POWDER, FOR SOLUTION ORAL DAILY
Refills: 0 | Status: DISCONTINUED | OUTPATIENT
Start: 2022-10-11 | End: 2022-10-12

## 2022-10-11 RX ORDER — POTASSIUM CHLORIDE 20 MEQ
40 PACKET (EA) ORAL ONCE
Refills: 0 | Status: COMPLETED | OUTPATIENT
Start: 2022-10-11 | End: 2022-10-11

## 2022-10-11 RX ORDER — ERGOCALCIFEROL 1.25 MG/1
1 CAPSULE ORAL
Qty: 0 | Refills: 0 | DISCHARGE

## 2022-10-11 RX ORDER — OXYCODONE HYDROCHLORIDE 5 MG/1
2.5 TABLET ORAL EVERY 4 HOURS
Refills: 0 | Status: DISCONTINUED | OUTPATIENT
Start: 2022-10-11 | End: 2022-10-12

## 2022-10-11 RX ORDER — INFLUENZA VIRUS VACCINE 15; 15; 15; 15 UG/.5ML; UG/.5ML; UG/.5ML; UG/.5ML
0.7 SUSPENSION INTRAMUSCULAR ONCE
Refills: 0 | Status: DISCONTINUED | OUTPATIENT
Start: 2022-10-11 | End: 2022-10-12

## 2022-10-11 RX ORDER — METOPROLOL TARTRATE 50 MG
50 TABLET ORAL DAILY
Refills: 0 | Status: DISCONTINUED | OUTPATIENT
Start: 2022-10-11 | End: 2022-10-12

## 2022-10-11 RX ORDER — GABAPENTIN 400 MG/1
100 CAPSULE ORAL EVERY 8 HOURS
Refills: 0 | Status: DISCONTINUED | OUTPATIENT
Start: 2022-10-11 | End: 2022-10-12

## 2022-10-11 RX ORDER — SIMVASTATIN 20 MG/1
1 TABLET, FILM COATED ORAL
Qty: 0 | Refills: 0 | DISCHARGE

## 2022-10-11 RX ORDER — ASPIRIN/CALCIUM CARB/MAGNESIUM 324 MG
1 TABLET ORAL
Qty: 0 | Refills: 0 | DISCHARGE

## 2022-10-11 RX ORDER — METOPROLOL TARTRATE 50 MG
1 TABLET ORAL
Qty: 0 | Refills: 0 | DISCHARGE

## 2022-10-11 RX ORDER — ACETAMINOPHEN 500 MG
1000 TABLET ORAL EVERY 8 HOURS
Refills: 0 | Status: DISCONTINUED | OUTPATIENT
Start: 2022-10-11 | End: 2022-10-12

## 2022-10-11 RX ORDER — HYDROCHLOROTHIAZIDE 25 MG
25 TABLET ORAL DAILY
Refills: 0 | Status: DISCONTINUED | OUTPATIENT
Start: 2022-10-11 | End: 2022-10-12

## 2022-10-11 RX ORDER — LOSARTAN POTASSIUM 100 MG/1
50 TABLET, FILM COATED ORAL DAILY
Refills: 0 | Status: DISCONTINUED | OUTPATIENT
Start: 2022-10-11 | End: 2022-10-12

## 2022-10-11 RX ORDER — NALOXONE HYDROCHLORIDE 4 MG/.1ML
0.4 SPRAY NASAL ONCE
Refills: 0 | Status: DISCONTINUED | OUTPATIENT
Start: 2022-10-11 | End: 2022-10-12

## 2022-10-11 RX ORDER — AMLODIPINE BESYLATE 2.5 MG/1
10 TABLET ORAL AT BEDTIME
Refills: 0 | Status: DISCONTINUED | OUTPATIENT
Start: 2022-10-11 | End: 2022-10-12

## 2022-10-11 RX ORDER — ASPIRIN/CALCIUM CARB/MAGNESIUM 324 MG
81 TABLET ORAL DAILY
Refills: 0 | Status: DISCONTINUED | OUTPATIENT
Start: 2022-10-11 | End: 2022-10-12

## 2022-10-11 RX ORDER — AMLODIPINE BESYLATE 2.5 MG/1
1 TABLET ORAL
Qty: 0 | Refills: 0 | DISCHARGE

## 2022-10-11 RX ADMIN — LIDOCAINE 1 PATCH: 4 CREAM TOPICAL at 09:25

## 2022-10-11 RX ADMIN — Medication 40 MILLIEQUIVALENT(S): at 09:24

## 2022-10-11 RX ADMIN — Medication 50 MILLIGRAM(S): at 10:19

## 2022-10-11 RX ADMIN — POLYETHYLENE GLYCOL 3350 17 GRAM(S): 17 POWDER, FOR SOLUTION ORAL at 09:25

## 2022-10-11 RX ADMIN — Medication 25 MILLIGRAM(S): at 10:18

## 2022-10-11 RX ADMIN — AMLODIPINE BESYLATE 10 MILLIGRAM(S): 2.5 TABLET ORAL at 21:30

## 2022-10-11 RX ADMIN — Medication 1000 MILLIGRAM(S): at 21:33

## 2022-10-11 RX ADMIN — SIMVASTATIN 10 MILLIGRAM(S): 20 TABLET, FILM COATED ORAL at 21:30

## 2022-10-11 RX ADMIN — SENNA PLUS 2 TABLET(S): 8.6 TABLET ORAL at 21:30

## 2022-10-11 RX ADMIN — ENOXAPARIN SODIUM 40 MILLIGRAM(S): 100 INJECTION SUBCUTANEOUS at 09:25

## 2022-10-11 RX ADMIN — Medication 81 MILLIGRAM(S): at 10:18

## 2022-10-11 RX ADMIN — GABAPENTIN 100 MILLIGRAM(S): 400 CAPSULE ORAL at 13:06

## 2022-10-11 RX ADMIN — GABAPENTIN 100 MILLIGRAM(S): 400 CAPSULE ORAL at 21:30

## 2022-10-11 RX ADMIN — Medication 1000 MILLIGRAM(S): at 13:06

## 2022-10-11 RX ADMIN — LOSARTAN POTASSIUM 50 MILLIGRAM(S): 100 TABLET, FILM COATED ORAL at 10:19

## 2022-10-11 NOTE — H&P ADULT - NSHPREVIEWOFSYSTEMS_GEN_ALL_CORE
REVIEW OF SYSTEMS:    CONSTITUTIONAL: No weakness, fevers or chills  EYES/ENT: No visual changes;  No vertigo or throat pain   NECK: No pain or stiffness  RESPIRATORY: No cough, wheezing, hemoptysis; No shortness of breath  CARDIOVASCULAR: No chest pain or palpitations  GASTROINTESTINAL: No abdominal or epigastric pain. No nausea, vomiting, or hematemesis; No diarrhea or constipation. No melena or hematochezia.  GENITOURINARY: No dysuria, frequency or hematuria  NEUROLOGICAL: No numbness or weakness  SKIN: No itching, burning, rashes, or lesions   All other review of systems is negative unless indicated above REVIEW OF SYSTEMS:    CONSTITUTIONAL: No weakness, fevers or chills  EYES/ENT: No visual changes;  No vertigo or throat pain   NECK: No pain or stiffness  RESPIRATORY: No cough, wheezing, hemoptysis; No shortness of breath  CARDIOVASCULAR: No chest pain or palpitations  GASTROINTESTINAL: No abdominal or epigastric pain. No nausea, vomiting, or hematemesis; No diarrhea or constipation. No melena or hematochezia.  GENITOURINARY: No dysuria, frequency or hematuria  NEUROLOGICAL: No numbness or weakness. + back pain  SKIN: No itching, burning, rashes, or lesions   All other review of systems is negative unless indicated above

## 2022-10-11 NOTE — H&P ADULT - NSHPLABSRESULTS_GEN_ALL_CORE
CBC:            12.4   8.76  )-----------( 252      ( 10-10-22 @ 23:16 )             38.2         Chem:         ( 10-11-22 @ 00:49 )    143  |  112<H>  |  13  ----------------------------<  104<H>  3.3<L>   |  25  |  0.76        Liver Functions: ( 10-11-22 @ 00:49 )  Alb: 3.4 g/dL / Pro: 7.4 gm/dL / ALK PHOS: 124 U/L / ALT: 23 U/L / AST: 17 U/L / GGT: x              Type & Screen:

## 2022-10-11 NOTE — PATIENT PROFILE ADULT - PATIENT REPRESENTATIVE: ( YOU CAN CHOOSE ANY PERSON THAT CAN ASSIST YOU WITH YOUR HEALTH CARE PREFERENCES, DOES NOT HAVE TO BE A SPOUSE, IMMEDIATE FAMILY OR SIGNIFICANT OTHER/PARTNER)
1st Attempt    Left voicemail for patient with assistance of international department asking for call back to schedule pulmonary appointment based on referral received by pulmonary department work queue.     Please direct any possible call back from patient to Toribio Cervantes, patient care navigator, pulmonary services.    
2nd Attempt    Connected patient to  at International Department and accidentally left call. Called the department back to attempt to see if I could get back into the call. Informed I would not be able to and the  would help patient.  
Message   Received: 2 days ago   Message Contents   Tracy Langston, RRT  Evens Sue LPN   Caller: Pt           Previous Messages      ----- Message -----   From: aLnre Jack   Sent: 1/6/2020   3:47 PM CST   To: [facility] Pulm Function [facility] Clinical Staff     Type:  Patient Returning Call     Who Called:Leydi Cordero   Who Left Message for Patient:EVENS SUE   Does the patient know what this is regarding?:appointment   Would the patient rather a call back or a response via [facility]? Call Back   Best Call Back Number:[phone] (home)   Additional Information:        
Received call back from  and patient merged on one call.    Able to schedule patient for the following appointment:    Future Appointments   Date Time Provider Department Center   1/9/2020  7:30 AM TREADMILL, NUCLEAR MEDICINE [facility] SPECARD [place]   1/9/2020  7:30 AM [facility] NM2 CAM1 CARDIAC [facility] NUCMED [facility]   1/9/2020  9:30 AM [facility] NM2 CAM[facility]   1/17/2020  1:40 PM [facility]   2/3/2020  4:30 PM Jourdan Conklin MD [facility] OBGYN [place]     Per , patient verbalized understanding of appointment date, time, and clinic location.  
declines

## 2022-10-11 NOTE — ED PROVIDER NOTE - OBJECTIVE STATEMENT
83 y/o female in ED c/o lower back pain radiating down BLE x 4-5 days worse today.   pt also c/o LUE pain radiating to left chest since this AM.   pt denies any fever, HA, sob, n/v/d/abd pain.

## 2022-10-11 NOTE — DISCHARGE NOTE NURSING/CASE MANAGEMENT/SOCIAL WORK - PATIENT PORTAL LINK FT
You can access the FollowMyHealth Patient Portal offered by [facility] by registering at the following website: [email or web address]. By joining [facility]’s FollowMyHealth portal, you will also be able to view your health information using other applications (apps) compatible with our system.

## 2022-10-11 NOTE — ED ADULT NURSE NOTE - OBJECTIVE STATEMENT
Pt presents to ED for SOB. Pt states onset of cough and labored breathing over the last few weeks. Hx od asthma. monitors in place. no acute distress noted at this time. Pt presents ED for back pain for four days. Pt states radiatio pain that has progresses. Denies falls or trauma. No acute distress noted at this time.

## 2022-10-11 NOTE — PHYSICAL THERAPY INITIAL EVALUATION ADULT - PERTINENT HX OF CURRENT PROBLEM, REHAB EVAL
83 yo F with pmh HTN, ET, h/o endometrial cancer presents with complaints of numerous bodily complaints. (Presently on 3 east as bridge order set) States 4 days ago she suddenly had this terrible back pain mosreso on the right with radiation to the groin with associated paresthesia to foot. She states the paresthesias are not new but have definetely gotten worse. Feels the pain is worse with movement limiting her ambulation. She denies radiculopathy and her discomfort is mostly to the anterior lower leg and right groin. She denies loss of bowels or urine, no saddle anesthesia. No recent falls. No clicking or popping sensation. She endorses her left arm was hurting her which prompted a bridge order set to telemetry. She states the pain was fleeting and no longer feels discomfort. No chest pain. No sob. Had a stress test with Dr Piper 2 months ago which was a normal study per patient. 81 yo F with pmh HTN, ET, h/o endometrial cancer presents with complaints of numerous bodily complaints. (Presently on 3 east as bridge order set) States 4 days ago she suddenly had this terrible back pain more so on the right with radiation to the groin with associated paresthesia to foot. She states the paresthesias are not new but have definitely gotten worse. Feels the pain is worse with movement limiting her ambulation. She denies radiculopathy and her discomfort is mostly to the anterior lower leg and right groin. She denies loss of bowels or urine, no saddle anesthesia. No recent falls. No clicking or popping sensation. She endorses her left arm was hurting her which prompted a bridge order set to telemetry. She states the pain was fleeting and no longer feels discomfort. No chest pain. No sob. Had a stress test with Dr Piper 2 months ago which was a normal study per patient.

## 2022-10-11 NOTE — PHARMACOTHERAPY INTERVENTION NOTE - COMMENTS
Medication history complete, reviewed medication with patient and confirmed with Roger.  Patient states she is taking cholesterol medication but does not remember the name- called [facility] and the last time patient had filled was Simvastatin 10mg in Aug 2020. Medication history complete, reviewed medication with patient and confirmed with DrFirst.  Patient states she is taking cholesterol medication but does not remember the name- called [facility] and the last time patient had filled was Simvastatin 10mg in Aug 2020.  Called patients daughter and granddaughter to see if they can confirm but there was no answer.

## 2022-10-11 NOTE — H&P ADULT - NSHPPHYSICALEXAM_GEN_ALL_CORE
ICU Vital Signs Last 24 Hrs  T(C): 36.3 (11 Oct 2022 06:42), Max: 36.8 (11 Oct 2022 06:33)  T(F): 97.3 (11 Oct 2022 06:42), Max: 98.2 (11 Oct 2022 06:33)  HR: 58 (11 Oct 2022 06:42) (58 - 71)  BP: 149/81 (11 Oct 2022 06:42) (126/- - 149/81)  BP(mean): 72 (11 Oct 2022 06:33) (72 - 90)  ABP: --  ABP(mean): --  RR: 18 (11 Oct 2022 06:42) (16 - 18)  SpO2: 100% (11 Oct 2022 06:42) (96% - 100%)    O2 Parameters below as of 11 Oct 2022 06:42  Patient On (Oxygen Delivery Method): room air            PHYSICAL EXAM:    Constitutional: NAD, awake and alert  HEENT: PERR, EOMI, Normal Hearing, MMM  Neck: Soft and supple, No LAD, No JVD  Respiratory: Breath sounds are clear bilaterally, No wheezing, rales or rhonchi  Cardiovascular: S1 and S2, regular rate and rhythm, no Murmurs, gallops or rubs  Gastrointestinal: Bowel Sounds present, soft, nontender, nondistended, no guarding, no rebound  Extremities: No peripheral edema  Vascular: 2+ peripheral pulses  Neurological: A/O x 3, no focal deficits  Musculoskeletal: 5/5 strength b/l upper and lower extremities  Skin: No rashes ICU Vital Signs Last 24 Hrs  T(C): 36.3 (11 Oct 2022 06:42), Max: 36.8 (11 Oct 2022 06:33)  T(F): 97.3 (11 Oct 2022 06:42), Max: 98.2 (11 Oct 2022 06:33)  HR: 58 (11 Oct 2022 06:42) (58 - 71)  BP: 149/81 (11 Oct 2022 06:42) (126/- - 149/81)  BP(mean): 72 (11 Oct 2022 06:33) (72 - 90)  ABP: --  ABP(mean): --  RR: 18 (11 Oct 2022 06:42) (16 - 18)  SpO2: 100% (11 Oct 2022 06:42) (96% - 100%)    O2 Parameters below as of 11 Oct 2022 06:42  Patient On (Oxygen Delivery Method): room air            PHYSICAL EXAM:    Constitutional: NAD, awake and alert  HEENT: PERR, EOMI, Normal Hearing, MMM  Neck: Soft and supple, No LAD, No JVD  Respiratory: Breath sounds are clear bilaterally, No wheezing, rales or rhonchi  Cardiovascular: S1 and S2, regular rate and rhythm, no Murmurs, gallops or rubs  Gastrointestinal: Bowel Sounds present, soft, nontender, nondistended, no guarding, no rebound  Extremities: No peripheral edema  Vascular: 2+ peripheral pulses  Neurological: A/O x 3, no focal deficits  Musculoskeletal: 5/5 strength b/l upper and lower extremities, no runoff or step down. Neurovascular exam in tact  Skin: No rashes

## 2022-10-11 NOTE — H&P ADULT - ASSESSMENT
#Intractable back pain likely MSK vs radiculopathy  - pt does not require telemetry  - start multimodal pain control  - add flexerill  - PT consult  - MRI of L/S and if negative, send to home later today  - fall precautions  - her left arm pain has dissipated, EKG wnl, VSS  - CXR negative      DVT px: LMWH  GOC: Full code, understands risks and benefits of CPR. time spent: 17 min

## 2022-10-11 NOTE — PATIENT PROFILE ADULT - FALL HARM RISK - RISK INTERVENTIONS

## 2022-10-11 NOTE — H&P ADULT - HISTORY OF PRESENT ILLNESS
PAST MEDICAL/SURGICAL/FAMILY/SOCIAL HISTORY:    Past Medical, Past Surgical, and Family History:  PAST MEDICAL HISTORY:  Ambulates with cane     Arthritis in both knees    Bilateral knee pain     Cataract Left    DJD (degenerative joint disease)     Endometrial cancer Dx 2021    Essential tremor in B/L arms    HLD (hyperlipidemia)     HTN (hypertension)     Legally blind.     PAST SURGICAL HISTORY:  H/O cataract extraction Right    History of  X 2.    social: neg x 3     83 yo F with pmh HTN, ET, h/o endometrial cancer presents with complaints of            PAST MEDICAL/SURGICAL/FAMILY/SOCIAL HISTORY:    Past Medical, Past Surgical, and Family History:  PAST MEDICAL HISTORY:  Ambulates with cane     Arthritis in both knees    Bilateral knee pain     Cataract Left    DJD (degenerative joint disease)     Endometrial cancer Dx 2021    Essential tremor in B/L arms    HLD (hyperlipidemia)     HTN (hypertension)     Legally blind.     PAST SURGICAL HISTORY:  H/O cataract extraction Right    History of  X 2.   Hysterectomy    FAMILY HISTORY:  No pertinent family history in first degree relatives.     81 yo F with pmh HTN, ET, h/o endometrial cancer presents with complaints of numerous bodily complaints. (Presently on 3 east as bridge order set) States 4 days ago she suddenly had this terrible back pain mosreso on the right with radiation to the groin with associated paresthesia to foot. She states the paresthesias are not new but have definetely gotten worse. Feels the pain is worse with movement limiting her ambulation. She denies radiculopathy and her discomfort is mostly to the anterior lower leg and right groin. She denies loss of bowels or urine, no saddle anesthesia. No recent falls. No clicking or popping sensation. She endorses her left arm was hurting her which prompted a bridge order set to telemetry. She states the pain was fleeting and no longer feels discomfort. No chest pain. No sob. Had a stress test with Dr Piper 2 months ago which was a normal study per patient.   She denies fevers, chills or rash. No dysuria. CT scan a/p was essentially negative.             PAST MEDICAL/SURGICAL/FAMILY/SOCIAL HISTORY:    Past Medical, Past Surgical, and Family History:  PAST MEDICAL HISTORY:  Ambulates with cane     Arthritis in both knees    Bilateral knee pain     Cataract Left    DJD (degenerative joint disease)     Endometrial cancer Dx 2021    Essential tremor in B/L arms    HLD (hyperlipidemia)     HTN (hypertension)     Legally blind.     PAST SURGICAL HISTORY:  H/O cataract extraction Right    History of  X 2.   Hysterectomy    FAMILY HISTORY:  No pertinent family history in first degree relatives.     83 yo F with pmh HTN, ET, h/o endometrial cancer presents with complaints of numerous bodily complaints. (Presently on 3 east as bridge order set) States 4 days ago she suddenly had this terrible back pain mosreso on the right with radiation to the groin with associated paresthesia to foot. She states the paresthesias are not new but have definetely gotten worse. Feels the pain is worse with movement limiting her ambulation. She denies radiculopathy and her discomfort is mostly to the anterior lower leg and right groin. She denies loss of bowels or urine, no saddle anesthesia. No recent falls. No clicking or popping sensation. She endorses her left arm was hurting her which prompted a bridge order set to telemetry. She states the pain was fleeting and no longer feels discomfort. No chest pain. No sob. Had a stress test with Dr Piper 2 months ago which was a normal study per patient.   She denies fevers, chills or rash. No dysuria. CT scan a/p was essentially negative. I personally walked this paitent with her walker and she was able to walk 50 feet with her cane without extreme discomfort.             PAST MEDICAL/SURGICAL/FAMILY/SOCIAL HISTORY:    Past Medical, Past Surgical, and Family History:  PAST MEDICAL HISTORY:  Ambulates with cane     Arthritis in both knees    Bilateral knee pain     Cataract Left    DJD (degenerative joint disease)     Endometrial cancer Dx 2021    Essential tremor in B/L arms    HLD (hyperlipidemia)     HTN (hypertension)     Legally blind.     PAST SURGICAL HISTORY:  H/O cataract extraction Right    History of  X 2.   Hysterectomy    FAMILY HISTORY:  No pertinent family history in first degree relatives.

## 2022-10-11 NOTE — ED PROVIDER NOTE - CLINICAL SUMMARY MEDICAL DECISION MAKING FREE TEXT BOX
pt with lower back pain radiating down BLE as well as LUE pain radiating to left chest concerning for cardiac.   will w/u and admit

## 2022-10-11 NOTE — PHYSICAL THERAPY INITIAL EVALUATION ADULT - GENERAL OBSERVATIONS, REHAB EVAL
Pt rec'd supine in bed, pleasant and cooperative with PT, endorses mild back pain with radiculopathy down both legs below the knees. Pt also endorses parasthesias in BLEs and generalized joint pain in all large joints.

## 2022-10-11 NOTE — DISCHARGE NOTE NURSING/CASE MANAGEMENT/SOCIAL WORK - NSDCPEFALRISK_GEN_ALL_CORE
For information on Fall & Injury Prevention, visit: [email or web address] OR  [email or web address] OR  https://www.cdc.gov/steadi/patient.html

## 2022-10-11 NOTE — PHYSICAL THERAPY INITIAL EVALUATION ADULT - MANUAL MUSCLE TESTING RESULTS, REHAB EVAL
except b/l shoulder elevation 3+/5 and b/l knee extension 3+/5 due to joint pain./no strength deficits were identified

## 2022-10-11 NOTE — ED ADULT NURSE NOTE - NSIMPLEMENTINTERV_GEN_ALL_ED
Implemented All Fall Risk Interventions:  [place] to call system. Call bell, personal items and telephone within reach. Instruct patient to call for assistance. Room bathroom lighting operational. Non-slip footwear when patient is off stretcher. Physically safe environment: no spills, clutter or unnecessary equipment. Stretcher in lowest position, wheels locked, appropriate side rails in place. Provide visual cue, wrist band, yellow gown, etc. Monitor gait and stability. Monitor for mental status changes and reorient to person, place, and time. Review medications for side effects contributing to fall risk. Reinforce activity limits and safety measures with patient and family.

## 2022-10-12 ENCOUNTER — TRANSCRIPTION ENCOUNTER (OUTPATIENT)
Age: 82
End: 2022-10-12

## 2022-10-12 VITALS
DIASTOLIC BLOOD PRESSURE: 51 MMHG | TEMPERATURE: 98 F | OXYGEN SATURATION: 95 % | RESPIRATION RATE: 18 BRPM | HEART RATE: 57 BPM | SYSTOLIC BLOOD PRESSURE: 121 MMHG

## 2022-10-12 PROCEDURE — 99239 HOSP IP/OBS DSCHRG MGMT >30: CPT

## 2022-10-12 PROCEDURE — 99221 1ST HOSP IP/OBS SF/LOW 40: CPT

## 2022-10-12 RX ORDER — LIDOCAINE 4 G/100G
1 CREAM TOPICAL
Qty: 30 | Refills: 0
Start: 2022-10-12 | End: 2022-11-10

## 2022-10-12 RX ORDER — ACETAMINOPHEN 500 MG
2 TABLET ORAL
Qty: 112 | Refills: 0
Start: 2022-10-12 | End: 2022-10-25

## 2022-10-12 RX ORDER — LOSARTAN POTASSIUM 100 MG/1
1 TABLET, FILM COATED ORAL
Qty: 0 | Refills: 0 | DISCHARGE

## 2022-10-12 RX ORDER — GABAPENTIN 400 MG/1
1 CAPSULE ORAL
Qty: 42 | Refills: 0
Start: 2022-10-12 | End: 2022-10-25

## 2022-10-12 RX ORDER — CYCLOBENZAPRINE HYDROCHLORIDE 10 MG/1
1 TABLET, FILM COATED ORAL
Qty: 42 | Refills: 0
Start: 2022-10-12 | End: 2022-10-25

## 2022-10-12 RX ORDER — SENNA PLUS 8.6 MG/1
2 TABLET ORAL
Qty: 60 | Refills: 0
Start: 2022-10-12 | End: 2022-11-10

## 2022-10-12 RX ORDER — LOSARTAN POTASSIUM 100 MG/1
1 TABLET, FILM COATED ORAL
Qty: 0 | Refills: 0 | DISCHARGE
Start: 2022-10-12

## 2022-10-12 RX ORDER — TRAMADOL HYDROCHLORIDE 50 MG/1
1 TABLET ORAL
Qty: 12 | Refills: 0
Start: 2022-10-12

## 2022-10-12 RX ADMIN — GABAPENTIN 100 MILLIGRAM(S): 400 CAPSULE ORAL at 14:59

## 2022-10-12 RX ADMIN — LIDOCAINE 1 PATCH: 4 CREAM TOPICAL at 10:44

## 2022-10-12 RX ADMIN — Medication 81 MILLIGRAM(S): at 10:45

## 2022-10-12 RX ADMIN — Medication 50 MILLIGRAM(S): at 10:45

## 2022-10-12 RX ADMIN — Medication 1000 MILLIGRAM(S): at 14:58

## 2022-10-12 RX ADMIN — GABAPENTIN 100 MILLIGRAM(S): 400 CAPSULE ORAL at 05:44

## 2022-10-12 RX ADMIN — Medication 25 MILLIGRAM(S): at 10:45

## 2022-10-12 RX ADMIN — POLYETHYLENE GLYCOL 3350 17 GRAM(S): 17 POWDER, FOR SOLUTION ORAL at 10:45

## 2022-10-12 RX ADMIN — Medication 1000 MILLIGRAM(S): at 05:44

## 2022-10-12 RX ADMIN — ENOXAPARIN SODIUM 40 MILLIGRAM(S): 100 INJECTION SUBCUTANEOUS at 10:44

## 2022-10-12 RX ADMIN — LOSARTAN POTASSIUM 50 MILLIGRAM(S): 100 TABLET, FILM COATED ORAL at 10:45

## 2022-10-12 NOTE — DISCHARGE NOTE PROVIDER - HOSPITAL COURSE
FROM H&P:    "81 yo F with pmh HTN, ET, h/o endometrial cancer presents with complaints of numerous bodily complaints. (Presently on 3 east as bridge order set) States 4 days ago she suddenly had this terrible back pain mosreso on the right with radiation to the groin with associated paresthesia to foot. She states the paresthesias are not new but have definetely gotten worse. Feels the pain is worse with movement limiting her ambulation. She denies radiculopathy and her discomfort is mostly to the anterior lower leg and right groin. She denies loss of bowels or urine, no saddle anesthesia. No recent falls. No clicking or popping sensation. She endorses her left arm was hurting her which prompted a bridge order set to telemetry. She states the pain was fleeting and no longer feels discomfort. No chest pain. No sob. Had a stress test with Dr Piper 2 months ago which was a normal study per patient.   She denies fevers, chills or rash. No dysuria. CT scan a/p was essentially negative. I personally walked this paitent with her walker and she was able to walk 50 feet with her cane without extreme discomfort.   #Intractable back pain likely MSK vs radiculopathy  - pt does not require telemetry  - start multimodal pain control  - add flexerill  - PT consult  - MRI of Lumbar with some herniated disks, mild/moderate canal stenosis and L4 nerve root compression. Symptoms have improved. Neurosurgery consulted. Patient wants to go home and since symptoms have improved, cleared by neurosurgery for close outpatient follow up and PT.   - fall precautions  - her left arm pain has dissipated, EKG wnl, VSS  - CXR negative    Work up grossly unremarkable for any acute process requiring intervention.. Discharge home in stable condition and close outpatient follow up.     T(C): 36.4 (10-12-22 @ 08:24), Max: 36.6 (10-11-22 @ 19:43)  HR: 57 (10-12-22 @ 08:24) (57 - 57)  BP: 121/51 (10-12-22 @ 08:24) (113/53 - 121/51)  RR: 18 (10-12-22 @ 08:24) (17 - 18)  SpO2: 95% (10-12-22 @ 08:24) (95% - 100%)  AAOx3; NAD  No JVD  RRR; No murmur;   Lungs CTA bilaterall  No injury;  Neuro exam grossly non-focal  Discharge Management: 39 minutes  Date of Discharge/Service: 10/12/2022

## 2022-10-12 NOTE — CONSULT NOTE ADULT - ASSESSMENT
83 yo F with PMH HTN, ET, h/o endometrial cancer presents with complaints of numerous bodily complaints. (Presently on 3 east as bridge order set) States 4 days ago she suddenly had this terrible back pain mosreso on the right with radiation to the groin   Neurosurgery was called to consult for MRI findings   -Moderate to severe degenerative disc disease and spondylosis   at L1-2 through L4-5 with  bulges , multi level stenosis, Broad-based RIGHT foraminal disc herniation at L4-5   compresses the exiting RIGHT L4 nerve root.  Pt was seen and examined and d/w dr. Walls who reviewed pt's MRI   pt is neurologically intact   Pt states her pain has improved since she has been here and would like to go home.   Would recommend trial of conservative treatement with PT, pain mgmt and outpt follow up. plan was d/w Dr. Silva and pt who are agreeable to the plan   all above d/w Dr. Walls who formulated the plan

## 2022-10-12 NOTE — DISCHARGE NOTE PROVIDER - NSDCFUSCHEDAPPT_GEN_ALL_CORE_FT
Silvia De La Cruz  [facility] Physician [facility]  [address]  Scheduled Appointment: 11/15/2022

## 2022-10-12 NOTE — DISCHARGE NOTE PROVIDER - CARE PROVIDERS DIRECT ADDRESSES
[email or web address]

## 2022-10-12 NOTE — DISCHARGE NOTE PROVIDER - CARE PROVIDER_API CALL
Chelsea Obrien (MD)  Family Medicine  [address] [place] [address]  Phone: [phone]  Fax: [phone]  Established Patient  Follow Up Time: 1 week    Jackson Walls; PhD)  Neurosurgery  [address] [place] [address], 2nd [place] [address]  Phone: [phone]  Fax: [phone]  Established Patient  Follow Up Time: 2 weeks

## 2022-10-12 NOTE — DISCHARGE NOTE PROVIDER - NSDCMRMEDTOKEN_GEN_ALL_CORE_FT
amLODIPine 10 mg oral tablet: 1 tab(s) orally once a day (at bedtime)  Aspirin Enteric Coated 81 mg oral delayed release tablet: 1 tab(s) orally once a day  bisacodyl 5 mg oral delayed release tablet: 1 tab(s) orally once a day, As needed, Constipation  cyclobenzaprine 5 mg oral tablet: 1 tab(s) orally every 8 hours, As needed, Spasm  gabapentin 100 mg oral capsule: 1 cap(s) orally every 8 hours  hydroCHLOROthiazide 25 mg oral tablet: 1 tab(s) orally once a day  lidocaine 4% topical film: Apply topically to affected area once a day   losartan 50 mg oral tablet: 1 tab(s) orally once a day  Metoprolol Succinate ER 50 mg oral tablet, extended release: 1 tab(s) orally once a day  senna leaf extract oral tablet: 2 tab(s) orally once a day (at bedtime); Hold if greater than 2 bowel movements in 24 hours.   simvastatin 10 mg oral tablet: 1 tab(s) orally once a day (at bedtime)  ***pt does not know name of cholesterol med- called [facility]- pt last filled this med 8/2020***  traMADol 50 mg oral tablet: 1 tab(s) orally every 8 hours, As Needed -for severe pain MDD:3 tablets  Tylenol 325 mg oral tablet: 2 tab(s) orally every 6 hours, As Needed Mild Pain

## 2022-10-12 NOTE — DISCHARGE NOTE PROVIDER - PROVIDER TOKENS
PROVIDER:[TOKEN:[[id number]],FOLLOWUP:[1 week],ESTABLISHEDPATIENT:[T]],PROVIDER:[TOKEN:[[id number]],FOLLOWUP:[2 weeks],ESTABLISHEDPATIENT:[T]]

## 2022-10-12 NOTE — DISCHARGE NOTE PROVIDER - NSDCCPCAREPLAN_GEN_ALL_CORE_FT
PRINCIPAL DISCHARGE DIAGNOSIS  Diagnosis: Back pain  Assessment and Plan of Treatment: Likely from herniated disks that appear to be chronic in nature. There are some nerves that are irritated that can cause pain, weakness or numbness. Follow up with the neurosurgeon closely outpatient and follow up with your primary medical doctor.      SECONDARY DISCHARGE DIAGNOSES  Diagnosis: Back pain  Assessment and Plan of Treatment:

## 2022-10-12 NOTE — CONSULT NOTE ADULT - SUBJECTIVE AND OBJECTIVE BOX
Patient is a 82y old  Female who presents with a chief complaint of back pain (12 Oct 2022 13:02)      HPI:      81 yo F with pmh HTN, ET, h/o endometrial cancer presents with complaints of numerous bodily complaints. (Presently on 3 east as bridge order set) States 4 days ago she suddenly had this terrible back pain mosreso on the right with radiation to the groin with associated paresthesia to foot. She states the paresthesias are not new but have definetely gotten worse. Feels the pain is worse with movement limiting her ambulation. She denies radiculopathy and her discomfort is mostly to the anterior lower leg and right groin. She denies loss of bowels or urine, no saddle anesthesia. No recent falls. No clicking or popping sensation. She endorses her left arm was hurting her which prompted a bridge order set to telemetry. She states the pain was fleeting and no longer feels discomfort. No chest pain. No sob. Had a stress test with Dr Piper 2 months ago which was a normal study per patient.   She denies fevers, chills or rash. No dysuria. CT scan a/p was essentially negative. I personally walked this paitent with her walker and she was able to walk 50 feet with her cane without extreme discomfort.             PAST MEDICAL/SURGICAL/FAMILY/SOCIAL HISTORY:    Past Medical, Past Surgical, and Family History:  PAST MEDICAL HISTORY:  Ambulates with cane     Arthritis in both knees    Bilateral knee pain     Cataract Left    DJD (degenerative joint disease)     Endometrial cancer Dx 2021    Essential tremor in B/L arms    HLD (hyperlipidemia)     HTN (hypertension)     Legally blind.     PAST SURGICAL HISTORY:  H/O cataract extraction Right    History of  X 2.   Hysterectomy    FAMILY HISTORY:  No pertinent family history in first degree relatives. (11 Oct 2022 07:50)      PAST MEDICAL & SURGICAL HISTORY:  HTN (hypertension)      Cataract  Left      Legally blind      DJD (degenerative joint disease)      Arthritis  in both knees      HLD (hyperlipidemia)      Endometrial cancer  Dx 2021      Bilateral knee pain      Essential tremor  in B/L arms      Ambulates with cane      History of   X 2      H/O cataract extraction  Right          FAMILY HISTORY:      Social Hx:  Nonsmoker, no drug or alcohol use    MEDICATIONS  (STANDING):  acetaminophen     Tablet .. 1000 milliGRAM(s) Oral every 8 hours  amLODIPine   Tablet 10 milliGRAM(s) Oral at bedtime  aspirin enteric coated 81 milliGRAM(s) Oral daily  enoxaparin Injectable 40 milliGRAM(s) SubCutaneous every 24 hours  gabapentin 100 milliGRAM(s) Oral every 8 hours  hydrochlorothiazide 25 milliGRAM(s) Oral daily  influenza  Vaccine (HIGH DOSE) 0.7 milliLiter(s) IntraMuscular once  lidocaine   4% Patch 1 Patch Transdermal daily  losartan 50 milliGRAM(s) Oral daily  metoprolol succinate ER 50 milliGRAM(s) Oral daily  naloxone Injectable 0.4 milliGRAM(s) IV Push once  polyethylene glycol 3350 17 Gram(s) Oral daily  senna 2 Tablet(s) Oral at bedtime  simvastatin 10 milliGRAM(s) Oral at bedtime       Allergies    No Known Allergies    Intolerances        ROS: Pertinent positives in HPI, all other ROS were reviewed and are negative.      Vital Signs Last 24 Hrs  T(C): 36.4 (12 Oct 2022 08:24), Max: 36.7 (11 Oct 2022 14:57)  T(F): 97.6 (12 Oct 2022 08:24), Max: 98.1 (11 Oct 2022 14:57)  HR: 57 (12 Oct 2022 08:24) (57 - 62)  BP: 121/51 (12 Oct 2022 08:24) (113/53 - 129/78)  BP(mean): --  RR: 18 (12 Oct 2022 08:24) (17 - 18)  SpO2: 95% (12 Oct 2022 08:24) (95% - 100%)    Parameters below as of 12 Oct 2022 08:24  Patient On (Oxygen Delivery Method): room air            Constitutional: awake and alert.  HEENT: PERRLA, EOMI,   Neck: Supple.  Respiratory: Breath sounds are clear bilaterally  Cardiovascular: S1 and S2, regular / irregular rhythm  Gastrointestinal: soft, nontender  Extremities:  no edema  Vascular: Caritid Bruit - no  Musculoskeletal: no joint swelling/tenderness, no abnormal movements  Skin: No rashes    Neurological exam:  HF: A x O x 3. Appropriately interactive, normal affect. Speech fluent, No Aphasia or paraphasic errors. Naming /repetition intact   CN: MILAN, EOMI, VFF, facial sensation normal, no NLFD, tongue midline, Palate moves equally, SCM equal bilaterally  Motor: No pronator drift, Strength 5/5 in all 4 ext, normal bulk and tone, no tremor, rigidity or bradykinesia.    Sens: Intact to light touch / PP/ VS/ JS    Reflexes: Symmetric and normal . BJ 2+, BR 2+, KJ 2+, AJ 2+, downgoing toes b/l  Coord:  No FNFA, dysmetria, ARMANI intact   Gait/Balance:  Cannot test      Labs:   10-11    143  |  112<H>  |  13  ----------------------------<  104<H>  [ ]CRITICAL CARE TIME PROVIDED TO UNSTABLE PT W/ ORGAN FAILURE            [x]> 50% OF THE TIME SPENT IN COUNSELING AND COORDINATING CARE     [ ]PROLONGED SERVICE       FACE TO FACE: [x]PT     [x ]PT & FAMILY    Start:               End:  	       Minutes:    3.3<L>   |  25  |  0.76    Ca    8.6      11 Oct 2022 00:49  Mg     2.0     10-11    TPro  7.4  /  Alb  3.4  /  TBili  0.3  /  DBili  x   /  AST  17  /  ALT  23  /  AlkPhos  124<H>  10-11                              12.4   8.76  )-----------( 252      ( 10 Oct 2022 23:16 )             38.2       Radiology:    ACC: [id number] EXAM:  MR SPINE LUMBAR                          PROCEDURE DATE:  10/11/2022          INTERPRETATION:  MR lumbar without gadolinium    CLINICAL INFORMATION: RIGHT back pain radiating to the leg    TECHNIQUE:   Sagittal and axial T1-weighted images, sagittal STIR images,   sagittal T2-weighted images and axial T1 and T2-weighted images of the   lumbar spine were obtained.    FINDINGS:   No prior similar studies are available for review.    Lumbar vertebral alignment is for grade 1 anterospondylolisthesis at L3-4   on a degenerative basis. There is straightening of the normal cervical   lordosis. Lumbar vertebral body heights are maintained.  Marrow signal   intensity within lumbar vertebral bodies and posterior elements is   unremarkable.  No osseous expansion, epidural disease or paraspinal   abnormality is found.    Lumbar intervertebral discs demonstrate moderate to severe degenerative   disc disease and spondylosis at L1-2 through L4-5 with loss of disc   height and associated degenerative endplate changes. Disc bulges are   noted which flatten the ventral thecal sac and narrow the BILATERAL   neural foramina. Mild central stenosis at L1-2, moderate central stenosis   at L2 to-3 and moderate to severe central stenosis at L3-4 and L4-5 on a   degenerative basis due to disc bulge, facet osteophytic hypertrophy and   redundancy of ligamentum flavum. Disc bulges also noted at L5-S1 with   mild to moderate facet osteophytic hypertrophy. Broad-based RIGHT   foraminal disc herniation at L4-5 compresses the exiting RIGHT L4 nerve   root.    The distal cord maintains intact morphology.  Distal cord signal   intensity is preserved.  The conus is normally positioned at the L1   level.  Nerve roots of the cauda equina appear intact.      IMPRESSION:  Moderate to severe degenerative disc disease and spondylosis   at L1-2 through L4-5 with  bulges are noted which flatten the ventral   thecal sac and narrow the BILATERAL neural foramina. Mild central   stenosis at L1-2,moderate central stenosis at L2 to-3 and moderate to   severe central stenosis at L3-4 and L4-5 on a degenerative basis. Disc   bulges also noted at L5-S1 with mild to moderate facet osteophytic   hypertrophy. Broad-based RIGHT foraminal disc herniation at L4-5   compresses the exiting RIGHT L4 nerve root.    --- End of Report ---            MATTEO DUKE MD; Attending Radiologist  This document has been electronically signed. Oct 11 2022  5:09PM    Total Critical Care Time spent:    I spent 50 minutes on this patient encounter, greater than 50% of the time was spent in reviewing the chart, performing an appropriate exam, reviewing counseling/coordination of care.    Patient is a 82y old  Female who presents with a chief complaint of back pain (12 Oct 2022 13:02)      HPI:      83 yo F with pmh HTN, ET, h/o endometrial cancer presents with complaints of numerous bodily complaints. (Presently on 3 east as bridge order set) States 4 days ago she suddenly had this terrible back pain mosreso on the right with radiation to the groin with associated paresthesia to foot. She states the paresthesias are not new but have definetely gotten worse. Feels the pain is worse with movement limiting her ambulation. She denies radiculopathy and her discomfort is mostly to the anterior lower leg and right groin. She denies loss of bowels or urine, no saddle anesthesia. No recent falls. No clicking or popping sensation. She endorses her left arm was hurting her which prompted a bridge order set to telemetry. She states the pain was fleeting and no longer feels discomfort. No chest pain. No sob. Had a stress test with Dr Piper 2 months ago which was a normal study per patient.   She denies fevers, chills or rash. No dysuria. CT scan a/p was essentially negative. I personally walked this paitent with her walker and she was able to walk 50 feet with her cane without extreme discomfort.     Neurosurgery was called to consult for MRI findings   Pt was seen and examined and d/w dr. Walls who reviewed pt's MRI   pt c/o pain in lower back radiated into groin and right thigh   pt denies any numbness, tingling, weakness, falls, b/b incontinence   Pt states her pain has improved since she has been here and would like to go home.   Would recommend trial of conservative treatement with PT, pain mgmt and outpt follow up. plan was d/w Dr. Silva and pt who are agreeable to the plan         PAST MEDICAL/SURGICAL/FAMILY/SOCIAL HISTORY:    Past Medical, Past Surgical, and Family History:  PAST MEDICAL HISTORY:  Ambulates with cane     Arthritis in both knees    Bilateral knee pain     Cataract Left    DJD (degenerative joint disease)     Endometrial cancer Dx 2021    Essential tremor in B/L arms    HLD (hyperlipidemia)     HTN (hypertension)     Legally blind.     PAST SURGICAL HISTORY:  H/O cataract extraction Right    History of  X 2.   Hysterectomy    FAMILY HISTORY:  No pertinent family history in first degree relatives. (11 Oct 2022 07:50)      PAST MEDICAL & SURGICAL HISTORY:  HTN (hypertension)      Cataract  Left      Legally blind      DJD (degenerative joint disease)      Arthritis  in both knees      HLD (hyperlipidemia)      Endometrial cancer  Dx 2021      Bilateral knee pain      Essential tremor  in B/L arms      Ambulates with cane      History of   X 2      H/O cataract extraction  Right          FAMILY HISTORY:      Social Hx:  Nonsmoker, no drug or alcohol use    MEDICATIONS  (STANDING):  acetaminophen     Tablet .. 1000 milliGRAM(s) Oral every 8 hours  amLODIPine   Tablet 10 milliGRAM(s) Oral at bedtime  aspirin enteric coated 81 milliGRAM(s) Oral daily  enoxaparin Injectable 40 milliGRAM(s) SubCutaneous every 24 hours  gabapentin 100 milliGRAM(s) Oral every 8 hours  hydrochlorothiazide 25 milliGRAM(s) Oral daily  influenza  Vaccine (HIGH DOSE) 0.7 milliLiter(s) IntraMuscular once  lidocaine   4% Patch 1 Patch Transdermal daily  losartan 50 milliGRAM(s) Oral daily  metoprolol succinate ER 50 milliGRAM(s) Oral daily  naloxone Injectable 0.4 milliGRAM(s) IV Push once  polyethylene glycol 3350 17 Gram(s) Oral daily  senna 2 Tablet(s) Oral at bedtime  simvastatin 10 milliGRAM(s) Oral at bedtime       Allergies    No Known Allergies    Intolerances        ROS: Pertinent positives in HPI, all other ROS were reviewed and are negative.      Vital Signs Last 24 Hrs  T(C): 36.4 (12 Oct 2022 08:24), Max: 36.7 (11 Oct 2022 14:57)  T(F): 97.6 (12 Oct 2022 08:24), Max: 98.1 (11 Oct 2022 14:57)  HR: 57 (12 Oct 2022 08:24) (57 - 62)  BP: 121/51 (12 Oct 2022 08:24) (113/53 - 129/78)  BP(mean): --  RR: 18 (12 Oct 2022 08:24) (17 - 18)  SpO2: 95% (12 Oct 2022 08:24) (95% - 100%)    Parameters below as of 12 Oct 2022 08:24  Patient On (Oxygen Delivery Method): room air            Constitutional: awake and alert.  HEENT: PERRLA, EOMI,   Neck: Supple.  Musculoskeletal: no joint swelling/tenderness, no abnormal movements  Skin: No rashes    Neurological exam:  HF: A x O x 3. Appropriately interactive, normal affect. Speech fluent, No Aphasia or paraphasic errors. Naming /repetition intact   CN: MILAN, EOMI, VFF, facial sensation normal, no NLFD  Motor: No pronator drift, Strength 5/5 in all 4 ext, normal bulk and tone, no tremor, rigidity or bradykinesia.    Sens: Intact to light touch   Reflexes: no clonus  downgoing toes b/l  Gait/Balance:  Cannot test      Labs:   10-11    143  |  112<H>  |  13  ----------------------------<  104<H>  [ ]CRITICAL CARE TIME PROVIDED TO UNSTABLE PT W/ ORGAN FAILURE            [x]> 50% OF THE TIME SPENT IN COUNSELING AND COORDINATING CARE     [ ]PROLONGED SERVICE       FACE TO FACE: [x]PT     [x ]PT & FAMILY    Start:               End:  	       Minutes:    3.3<L>   |  25  |  0.76    Ca    8.6      11 Oct 2022 00:49  Mg     2.0     10-11    TPro  7.4  /  Alb  3.4  /  TBili  0.3  /  DBili  x   /  AST  17  /  ALT  23  /  AlkPhos  124<H>  10-11                              12.4   8.76  )-----------( 252      ( 10 Oct 2022 23:16 )             38.2       Radiology:    ACC: [id number] EXAM:  MR SPINE LUMBAR                          PROCEDURE DATE:  10/11/2022          INTERPRETATION:  MR lumbar without gadolinium    CLINICAL INFORMATION: RIGHT back pain radiating to the leg    TECHNIQUE:   Sagittal and axial T1-weighted images, sagittal STIR images,   sagittal T2-weighted images and axial T1 and T2-weighted images of the   lumbar spine were obtained.    FINDINGS:   No prior similar studies are available for review.    Lumbar vertebral alignment is for grade 1 anterospondylolisthesis at L3-4   on a degenerative basis. There is straightening of the normal cervical   lordosis. Lumbar vertebral body heights are maintained.  Marrow signal   intensity within lumbar vertebral bodies and posterior elements is   unremarkable.  No osseous expansion, epidural disease or paraspinal   abnormality is found.    Lumbar intervertebral discs demonstrate moderate to severe degenerative   disc disease and spondylosis at L1-2 through L4-5 with loss of disc   height and associated degenerative endplate changes. Disc bulges are   noted which flatten the ventral thecal sac and narrow the BILATERAL   neural foramina. Mild central stenosis at L1-2, moderate central stenosis   at L2 to-3 and moderate to severe central stenosis at L3-4 and L4-5 on a   degenerative basis due to disc bulge, facet osteophytic hypertrophy and   redundancy of ligamentum flavum. Disc bulges also noted at L5-S1 with   mild to moderate facet osteophytic hypertrophy. Broad-based RIGHT   foraminal disc herniation at L4-5 compresses the exiting RIGHT L4 nerve   root.    The distal cord maintains intact morphology.  Distal cord signal   intensity is preserved.  The conus is normally positioned at the L1   level.  Nerve roots of the cauda equina appear intact.      IMPRESSION:  Moderate to severe degenerative disc disease and spondylosis   at L1-2 through L4-5 with  bulges are noted which flatten the ventral   thecal sac and narrow the BILATERAL neural foramina. Mild central   stenosis at L1-2,moderate central stenosis at L2 to-3 and moderate to   severe central stenosis at L3-4 and L4-5 on a degenerative basis. Disc   bulges also noted at L5-S1 with mild to moderate facet osteophytic   hypertrophy. Broad-based RIGHT foraminal disc herniation at L4-5   compresses the exiting RIGHT L4 nerve root.    --- End of Report ---            MATTEO DUKE MD; Attending Radiologist  This document has been electronically signed. Oct 11 2022  5:09PM    Total Critical Care Time spent:    I spent 50 minutes on this patient encounter, greater than 50% of the time was spent in reviewing the chart, performing an appropriate exam, reviewing counseling/coordination of care.

## 2022-10-17 DIAGNOSIS — Z85.42 PERSONAL HISTORY OF MALIGNANT NEOPLASM OF OTHER PARTS OF UTERUS: ICD-10-CM

## 2022-10-17 DIAGNOSIS — M17.0 BILATERAL PRIMARY OSTEOARTHRITIS OF KNEE: ICD-10-CM

## 2022-10-17 DIAGNOSIS — E78.5 HYPERLIPIDEMIA, UNSPECIFIED: ICD-10-CM

## 2022-10-17 DIAGNOSIS — M51.26 OTHER INTERVERTEBRAL DISC DISPLACEMENT, LUMBAR REGION: ICD-10-CM

## 2022-10-17 DIAGNOSIS — I10 ESSENTIAL (PRIMARY) HYPERTENSION: ICD-10-CM

## 2022-10-17 DIAGNOSIS — M47.816 SPONDYLOSIS WITHOUT MYELOPATHY OR RADICULOPATHY, LUMBAR REGION: ICD-10-CM

## 2022-10-17 DIAGNOSIS — H54.8 LEGAL BLINDNESS, AS DEFINED IN USA: ICD-10-CM

## 2022-11-09 ENCOUNTER — APPOINTMENT (OUTPATIENT)
Dept: NEUROSURGERY | Facility: CLINIC | Age: 82
End: 2022-11-09

## 2022-11-09 VITALS
DIASTOLIC BLOOD PRESSURE: 95 MMHG | HEIGHT: 61 IN | TEMPERATURE: 97.1 F | WEIGHT: 189 LBS | BODY MASS INDEX: 35.68 KG/M2 | SYSTOLIC BLOOD PRESSURE: 171 MMHG | OXYGEN SATURATION: 99 % | HEART RATE: 91 BPM

## 2022-11-09 DIAGNOSIS — M54.41 LUMBAGO WITH SCIATICA, RIGHT SIDE: ICD-10-CM

## 2022-11-09 DIAGNOSIS — M25.561 PAIN IN RIGHT KNEE: ICD-10-CM

## 2022-11-09 DIAGNOSIS — M25.562 PAIN IN RIGHT KNEE: ICD-10-CM

## 2022-11-09 PROCEDURE — 99214 OFFICE O/P EST MOD 30 MIN: CPT

## 2022-11-09 NOTE — CONSULT LETTER
[Dear  ___] : Dear  [unfilled], [Courtesy Letter:] : I had the pleasure of seeing your patient, [unfilled], in my office today. [Sincerely,] : Sincerely, [FreeTextEntry2] : Chelsea Obrien MD\par [address]\par [place] [address]\par  [FreeTextEntry1] : Ms. Farr is a very pleasant 89-year-old female patient who was seen in our office today in regards to low back pain and right-sided leg symptoms.\par \par The patient is a poor historian.  The patient lives with her granddaughter who we will try to contact to obtain a better history.  The patient endorses a sudden onset of back pain on the right with associated numbness and tingling in the right leg starting approximately 3 weeks ago.  At this time, the patient continues to complain of pain in the back radiating into her pelvis as well as numbness in her right leg.  The patient is using a cane to help with balance and ambulation.  The patient also complains of bilateral knee pain which has been ongoing for several years.  The patient states that she has had injections for her knees in the past which provided relief for months.\par \par The patient's past medical history is significant for endometrial cancer as well as the possibility of breast cancer based on the notes I have available.  The patient states that her current medical regimen includes amlodipine, metoprolol, hydrochlorothiazide, simvastatin, and multivitamins.  According to the patient's chart, the patient has been given cyclobenzaprine, gabapentin, and tramadol.  However, the patient is unclear as to whether or not she takes any of these medications currently.  The patient denies allergies to medications.\par \par On examination, the patient is alert, oriented, and compliant with the exam.  The patient demonstrates full strength in the lower extremities bilaterally.  I am unable to obtain reflexes at the patella tendons.  The patient demonstrates 1+ reflexes at the Achilles tendons bilaterally.  The patient ambulates with an antalgic gait.\par \par The patient is accompanied with MRI scans of the lumbar spine dated October 11, 2022. These images demonstrate degenerative changes most prominently at L3-5. Bilateral lateral recess stenosis and central stenosis is noted to be worst at L3/4. At L4/5, lateral recess stenosis is worse on the right which may be causing traversing nerve root compression. The patient additionally demonstrates a loss of lumbar lordosis, but these images were performed sitting. \par \par Taken together, the patient has a clinical history and radiographic findings most consistent with chronic arthritic pain in the knees, arthritic pain in the back, and possibly a mild right-sided radiculopathy.  The patient's pelvic pain is more likely related to her recent diagnosis and resection of endometrial cancer in 2021.  At this time, I explained the situation as best as I could to the patient but I am not sure she completely understands the situation.  I explained that arthritic changes in her lumbar spine might be causing irritation of the nerve root but surgery should be only considered if she has severe right-sided radicular pain.  I also offered the patient an evaluation for her knees as well as a pain management doctor for the possibility of injections in her back at this time which she agreed to.  Finally, I went over several conservative therapy options with the patient but the patient is currently not clear as to what medications she is taking.  The patient's arthritic pain is likely responsive to NSAID medications but again without a clear medical history or medication history, I will not be providing any prescriptions at this time.  Again, we will attempt to contact the patient's granddaughter to help with this shortly. [FreeTextEntry3] : Jackson Walls MD, PhD, CS, FAANS Attending Neurosurgeon  of Neurosurgery [facility] at [facility] at [address], [place] [address] Office: [phone] Fax: [phone]

## 2022-11-15 ENCOUNTER — NON-APPOINTMENT (OUTPATIENT)
Age: 82
End: 2022-11-15

## 2022-11-15 ENCOUNTER — APPOINTMENT (OUTPATIENT)
Dept: GYNECOLOGIC ONCOLOGY | Facility: CLINIC | Age: 82
End: 2022-11-15

## 2022-11-15 DIAGNOSIS — B97.7 PAPILLOMAVIRUS AS THE CAUSE OF DISEASES CLASSIFIED ELSEWHERE: ICD-10-CM

## 2022-11-16 ENCOUNTER — APPOINTMENT (OUTPATIENT)
Dept: ORTHOPEDIC SURGERY | Facility: CLINIC | Age: 82
End: 2022-11-16

## 2022-11-16 VITALS
SYSTOLIC BLOOD PRESSURE: 139 MMHG | HEIGHT: 61 IN | DIASTOLIC BLOOD PRESSURE: 75 MMHG | HEART RATE: 100 BPM | BODY MASS INDEX: 35.68 KG/M2 | WEIGHT: 189 LBS

## 2022-11-16 DIAGNOSIS — Z82.61 FAMILY HISTORY OF ARTHRITIS: ICD-10-CM

## 2022-11-16 DIAGNOSIS — Z86.79 PERSONAL HISTORY OF OTHER DISEASES OF THE CIRCULATORY SYSTEM: ICD-10-CM

## 2022-11-16 PROCEDURE — 73560 X-RAY EXAM OF KNEE 1 OR 2: CPT | Mod: 50

## 2022-11-16 PROCEDURE — 99204 OFFICE O/P NEW MOD 45 MIN: CPT

## 2022-11-17 RX ORDER — HYLAN G-F 20 16MG/2ML
16 SYRINGE (ML) INTRAARTICULAR
Qty: 3 | Refills: 0 | Status: ACTIVE | OUTPATIENT
Start: 2022-11-17

## 2022-11-18 PROBLEM — Z82.61 FAMILY HISTORY OF ARTHRITIS: Status: ACTIVE | Noted: 2022-11-18

## 2022-11-18 PROBLEM — Z86.79 HISTORY OF HYPERTENSION: Status: RESOLVED | Noted: 2022-11-18 | Resolved: 2022-11-18

## 2022-11-21 NOTE — ADDENDUM
[FreeTextEntry1] : This note was written by Christa Suarez on 11/21/2022 acting as a scribe for ANSELMO DELUCA III, MD

## 2022-11-21 NOTE — CONSULT LETTER
[Dear  ___] : Dear  [unfilled], [FreeTextEntry1] : \par I had the pleasure of evaluating your patient, Pia Farr.\par \par Thank you very much for allowing me to participate in the care of this patient.\par Please see my note below.\par \par If you have any questions, please do not hesitate to contact me.\par \par Sincerely,\par \par \par French Gomez III, MD\par ELOINA/sg  Normal

## 2022-11-21 NOTE — PHYSICAL EXAM
[de-identified] : Right Knee: \par Range of Motion in Degrees	\par 	                  Claimant:	Normal:	\par Flexion Active	  120 	                135-degrees	\par Flexion Passive	  120	                135-degrees	\par Extension Active	   10	                0-5-degrees	\par Extension Passive	   10	                0-5-degrees	\par \par No weakness to flexion/extension.  No evidence of instability in the AP plane or varus or valgus stress.  Negative  Lachman.  Negative pivot shift.  Negative anterior drawer test.  Negative posterior drawer test.  Negative Eloisa.  Negative Apley grind.  No medial or lateral joint line tenderness.  Positive tenderness over the medial and lateral facet of the patella.  Positive patellofemoral crepitations.  No lateral tilting patella.  No patella apprehension.  Positive crepitation in the medial and lateral femoral condyle.  No proximal or distal swelling, edema or tenderness.  No gross motor or sensory deficits.  Mild intra-articular swelling.  2+ DP and PT pulses.  Moderate valgus alignment.  Skin is intact.  No rashes, scars or lesions. \par \par Left Knee: \par Range of Motion in Degrees	\par 	                  Claimant:	Normal:	\par Flexion Active	  120 	                135-degrees	\par Flexion Passive	  120	                135-degrees	\par Extension Active	   10	                0-5-degrees	\par Extension Passive	   10	                0-5-degrees	\par \par No weakness to flexion/extension.  No evidence of instability in the AP plane or varus or valgus stress.  Negative  Lachman.  Negative pivot shift.  Negative anterior drawer test.  Negative posterior drawer test.  Negative Eloisa.  Negative Apley grind.  No medial or lateral joint line tenderness.  Positive tenderness over the medial and lateral facet of the patella.  Positive patellofemoral crepitations.  No lateral tilting patella.  No patella apprehension.  Positive crepitation in the medial and lateral femoral condyle.  No proximal or distal swelling, edema or tenderness.  No gross motor or sensory deficits.  Mild intra-articular swelling.  2+ DP and PT pulses.  Mild valgus alignment.  Skin is intact.  No rashes, scars or lesions. \par  [de-identified] : Ambulating with a slightly antalgic to antalgic gait.  Station:  Normal.  [de-identified] : Appearance:  Well-developed, well-nourished female in no acute distress.\par   [de-identified] : Radiographs, which were taken in the office today, one-two views of the right knee, one-two views of the left knee, including AP Standing, show severe arthritis of the bilateral knees (right is worse than the left).

## 2022-11-21 NOTE — DISCUSSION/SUMMARY
[de-identified] : At this time, due to osteoarthritis of the bilateral knees, I had a long discussion with her concerning operative vs. nonoperative modalities.  She would like to do a repeat course of viscosupplementation since it helped her in the past.

## 2022-11-21 NOTE — HISTORY OF PRESENT ILLNESS
[de-identified] : The patient comes in today with complaints to her bilateral knees.  She has had some injections in the past, which did help. The patient states the pain is constant.  The patient describes the pain as achy.  The patient notes pain medication makes her symptoms better.  The patient indicates severe pain on a pain scale of no pain to worst pain imaginable.

## 2022-12-19 ENCOUNTER — APPOINTMENT (OUTPATIENT)
Dept: ORTHOPEDIC SURGERY | Facility: CLINIC | Age: 82
End: 2022-12-19
Payer: MEDICARE

## 2022-12-19 PROCEDURE — 20610 DRAIN/INJ JOINT/BURSA W/O US: CPT | Mod: 50

## 2022-12-20 NOTE — ADDENDUM
[FreeTextEntry1] : This note was written by Henrry Lance on 12/20/2022, acting as a scribe for French Gomez III, MD

## 2022-12-20 NOTE — PHYSICAL EXAM
[de-identified] : Right Knee: \par Range of Motion in Degrees	\par 	            Claimant:	Normal:	\par Flexion Active	 120 	 135-degrees	\par Flexion Passive	 120	 135-degrees	\par Extension Active	 10	 0-5-degrees	\par Extension Passive	 10	 0-5-degrees	\par \par No weakness to flexion/extension. No evidence of instability in the AP plane or varus or valgus stress. Negative Lachman. Negative pivot shift. Negative anterior drawer test. Negative posterior drawer test. Negative Eloisa. Negative Apley grind. No medial or lateral joint line tenderness. Positive tenderness over the medial and lateral facet of the patella. Positive patellofemoral crepitations. No lateral tilting patella. No patella apprehension. Positive crepitation in the medial and lateral femoral condyle. No proximal or distal swelling, edema or tenderness. No gross motor or sensory deficits. Mild intra-articular swelling. 2+ DP and PT pulses. Moderate valgus alignment. Skin is intact. No rashes, scars or lesions. \par \par Left Knee: \par Range of Motion in Degrees	\par 	             Claimant:	Normal:	\par Flexion Active	 120 	 135-degrees	\par Flexion Passive	 120	 135-degrees	\par Extension Active	 10	 0-5-degrees	\par Extension Passive	 10	 0-5-degrees	\par \par No weakness to flexion/extension. No evidence of instability in the AP plane or varus or valgus stress. Negative Lachman. Negative pivot shift. Negative anterior drawer test. Negative posterior drawer test. Negative Eloisa. Negative Apley grind. No medial or lateral joint line tenderness. Positive tenderness over the medial and lateral facet of the patella. Positive patellofemoral crepitations. No lateral tilting patella. No patella apprehension. Positive crepitation in the medial and lateral femoral condyle. No proximal or distal swelling, edema or tenderness. No gross motor or sensory deficits. Mild intra-articular swelling. 2+ DP and PT pulses. Mild valgus alignment. Skin is intact. No rashes, scars or lesions. \par

## 2022-12-20 NOTE — PROCEDURE
[de-identified] : Indications: \par Osteoarthritis of the right knee\par Osteoarthritis of the left knee\par \par Consent:\par The risks and benefits of the procedure were discussed with the patient in detail.  Upon verbal consent of the patient, we proceeded with the Synvisc injections as noted below.  \par \par Procedure: \par After sterile prep, the patient underwent a Synvisc injection of 16 mg of Sodium Hyaluronate in a 2 mL syringe into the right and left knee.  The patient tolerated the procedures well.  There were no complications.  I have recommended ice and elevation.  \par \par :  [employer]\par NDC#:  [address]-07\par Lot#:    [id number] \par Exp. Date:  06/30/2025\par \par Plan:\par At this time, I recommended ice and elevation.  The patient will be reassessed in 1 week for the next Synvisc-3 injection for the right and left knee osteoarthritis.\par

## 2022-12-27 NOTE — ED ADULT NURSE NOTE - CAS TRG GENERAL NORM CIRC DET
You presented to clinic to establish care. We discussed the major stressors in your life leading to depressive symptoms aldo anxiety with insomnia. We can try a medication called mirtazapine to help with the depressive symptoms and assist with insomnia. If your symptoms ever seem to become unmanageable I recommend seeking help from a physchologist/physchiatrist, both of which you can set up an appointment by using the number on the back of your insurance card if necessary.     Regarding your thyroid, seizures and migraines, we can continue your current medication doses for now, however we will take off blood work today to check your levels and see if those medications have to be adjusted.     We'll also take labs to check your blood sugar, blood count, cholesterol, liver and kidney function. I will call you to go over these results if abnormal.    I've placed a referral for both your screening mammogram and to ENT for your recurrent nosebleeds, sinus pain with drainage and lack of smell.  
Strong peripheral pulses

## 2022-12-28 ENCOUNTER — APPOINTMENT (OUTPATIENT)
Dept: ORTHOPEDIC SURGERY | Facility: CLINIC | Age: 82
End: 2022-12-28
Payer: MEDICARE

## 2022-12-28 PROCEDURE — 20610 DRAIN/INJ JOINT/BURSA W/O US: CPT | Mod: 50

## 2023-01-03 NOTE — ADDENDUM
[FreeTextEntry1] : This note was written by Christa Suarez on 01/03/2023 acting as a scribe for ANSELMO DELUCA III, MD

## 2023-01-03 NOTE — PHYSICAL EXAM
[de-identified] : Right Knee: \par Range of Motion in Degrees	\par 	 Claimant:	Normal:	\par Flexion Active	 120 	 135-degrees	\par Flexion Passive	 120	 135-degrees	\par Extension Active	 10	 0-5-degrees	\par Extension Passive	 10	 0-5-degrees	\par \par No weakness to flexion/extension. No evidence of instability in the AP plane or varus or valgus stress. Negative Lachman. Negative pivot shift. Negative anterior drawer test. Negative posterior drawer test. Negative Eloisa. Negative Apley grind. No medial or lateral joint line tenderness. Positive tenderness over the medial and lateral facet of the patella. Positive patellofemoral crepitations. No lateral tilting patella. No patella apprehension. Positive crepitation in the medial and lateral femoral condyle. No proximal or distal swelling, edema or tenderness. No gross motor or sensory deficits. Mild intra-articular swelling. 2+ DP and PT pulses. Moderate valgus alignment. Skin is intact. No rashes, scars or lesions. \par \par Left Knee: \par Range of Motion in Degrees	\par 	 Claimant:	Normal:	\par Flexion Active	 120 	 135-degrees	\par Flexion Passive	 120	 135-degrees	\par Extension Active	 10	 0-5-degrees	\par Extension Passive	 10	 0-5-degrees	\par \par No weakness to flexion/extension. No evidence of instability in the AP plane or varus or valgus stress. Negative Lachman. Negative pivot shift. Negative anterior drawer test. Negative posterior drawer test. Negative Eloisa. Negative Apley grind. No medial or lateral joint line tenderness. Positive tenderness over the medial and lateral facet of the patella. Positive patellofemoral crepitations. No lateral tilting patella. No patella apprehension. Positive crepitation in the medial and lateral femoral condyle. No proximal or distal swelling, edema or tenderness. No gross motor or sensory deficits. Mild intra-articular swelling. 2+ DP and PT pulses. Mild valgus alignment. Skin is intact. No rashes, scars or lesions.

## 2023-01-03 NOTE — PROCEDURE
[de-identified] : Indications: \par Osteoarthritis of the right knee\par Osteoarthritis of the left knee\par \par Consent:\par The risks and benefits of the procedure were discussed with the patient in detail. Upon verbal consent of the patient, we proceeded with the Synvisc injections as noted below. \par \par Procedure: \par After sterile prep, the patient underwent a Synvisc injection of 16 mg of Sodium Hyaluronate in a 2 mL syringe into the right and left knee. The patient tolerated the procedures well. There were no complications. I have recommended ice and elevation. \par \par : [employer]\par NDC#: [address]-27\par Lot#: [id number] \par Exp. Date: 06/30/2025\par \par Plan:\par At this time, I recommended ice and elevation. The patient will be reassessed in 1 week for the next Synvisc-3 injection for the right and left knee osteoarthritis.

## 2023-01-09 ENCOUNTER — APPOINTMENT (OUTPATIENT)
Dept: ORTHOPEDIC SURGERY | Facility: CLINIC | Age: 83
End: 2023-01-09

## 2023-01-09 DIAGNOSIS — M17.0 BILATERAL PRIMARY OSTEOARTHRITIS OF KNEE: ICD-10-CM

## 2023-01-17 ENCOUNTER — NON-APPOINTMENT (OUTPATIENT)
Age: 83
End: 2023-01-17

## 2023-01-17 ENCOUNTER — APPOINTMENT (OUTPATIENT)
Dept: GYNECOLOGIC ONCOLOGY | Facility: CLINIC | Age: 83
End: 2023-01-17

## 2023-03-13 NOTE — ED ADULT NURSE NOTE - NSFALLRSKPASTHIST_ED_ALL_ED
Problem and/or Symptoms are currently stable and/or improving on current treatment plan.  Will continue and have patient follow up as directed.    Pertinent labs reviewed & pertinent meds refilled X 6 M   no

## 2023-04-03 ENCOUNTER — NON-APPOINTMENT (OUTPATIENT)
Age: 83
End: 2023-04-03

## 2023-04-06 ENCOUNTER — OUTPATIENT (OUTPATIENT)
Dept: OUTPATIENT SERVICES | Facility: HOSPITAL | Age: 83
LOS: 1 days | End: 2023-04-06
Payer: MEDICARE

## 2023-04-06 ENCOUNTER — APPOINTMENT (OUTPATIENT)
Dept: RADIOLOGY | Facility: CLINIC | Age: 83
End: 2023-04-06
Payer: MEDICARE

## 2023-04-06 DIAGNOSIS — Z00.8 ENCOUNTER FOR OTHER GENERAL EXAMINATION: ICD-10-CM

## 2023-04-06 DIAGNOSIS — Z98.49 CATARACT EXTRACTION STATUS, UNSPECIFIED EYE: Chronic | ICD-10-CM

## 2023-04-06 DIAGNOSIS — Z98.89 OTHER SPECIFIED POSTPROCEDURAL STATES: Chronic | ICD-10-CM

## 2023-04-06 DIAGNOSIS — M25.512 PAIN IN LEFT SHOULDER: ICD-10-CM

## 2023-04-06 PROCEDURE — 73030 X-RAY EXAM OF SHOULDER: CPT

## 2023-04-06 PROCEDURE — 73030 X-RAY EXAM OF SHOULDER: CPT | Mod: 26,LT

## 2023-05-05 NOTE — DISCHARGE NOTE ADULT - VISION (WITH CORRECTIVE LENSES IF THE PATIENT USUALLY WEARS THEM):
Patient informed. Patient verbalized understanding    Normal vision: sees adequately in most situations; can see medication labels, newsprint

## 2023-05-28 NOTE — PATIENT PROFILE ADULT. - HAS THE PATIENT HAD A SIGNIFICANT CHANGE IN FUNCTIONAL STATUS DUE TO CVA, HEAD TRAUMA, ORTHOPEDIC TRAUMA/SURGERY, OR FALL, WITH THE WEEK PRIOR TO ADMISSION
.  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  no clinical evidence of CVA.  no bacteremia. no suspicion of endocarditis.    - NO further work up as of now. MUSTAPHA as outpatient for prosthetic MV regurgitation and prosthetic aortic stenosis assessment. no

## 2023-11-03 NOTE — ED ADULT TRIAGE NOTE - CCCP TRG CHIEF CMPLNT
Left detailed VM regarding lab results  
RELAY    ----- Message from Justa Castano MD sent at 11/3/2023  1:30 PM EDT -----  Blood sugar slightly elevated so watch your carbohydrates and increase your walking to tolerance.  
chest pain

## 2023-11-21 NOTE — ED ADULT TRIAGE NOTE - NS ED NURSE AMBULANCES
[facility] [employer] Are Labs Available For Review?: No- Labs Deferred This Month

## 2023-11-28 ENCOUNTER — EMERGENCY (EMERGENCY)
Facility: HOSPITAL | Age: 83
LOS: 0 days | Discharge: HOME | End: 2023-11-29
Attending: EMERGENCY MEDICINE
Payer: MEDICARE

## 2023-11-28 VITALS — WEIGHT: 188.94 LBS | HEIGHT: 62 IN

## 2023-11-28 DIAGNOSIS — R29.898 OTHER SYMPTOMS AND SIGNS INVOLVING THE MUSCULOSKELETAL SYSTEM: ICD-10-CM

## 2023-11-28 DIAGNOSIS — I10 ESSENTIAL (PRIMARY) HYPERTENSION: ICD-10-CM

## 2023-11-28 DIAGNOSIS — Z98.49 CATARACT EXTRACTION STATUS, UNSPECIFIED EYE: Chronic | ICD-10-CM

## 2023-11-28 DIAGNOSIS — Z98.89 OTHER SPECIFIED POSTPROCEDURAL STATES: Chronic | ICD-10-CM

## 2023-11-28 LAB
ALBUMIN SERPL ELPH-MCNC: 3.5 G/DL — SIGNIFICANT CHANGE UP (ref 3.3–5)
ALBUMIN SERPL ELPH-MCNC: 3.5 G/DL — SIGNIFICANT CHANGE UP (ref 3.3–5)
ALP SERPL-CCNC: 125 U/L — HIGH (ref 40–120)
ALP SERPL-CCNC: 125 U/L — HIGH (ref 40–120)
ALT FLD-CCNC: 18 U/L — SIGNIFICANT CHANGE UP (ref 12–78)
ALT FLD-CCNC: 18 U/L — SIGNIFICANT CHANGE UP (ref 12–78)
ANION GAP SERPL CALC-SCNC: 6 MMOL/L — SIGNIFICANT CHANGE UP (ref 5–17)
ANION GAP SERPL CALC-SCNC: 6 MMOL/L — SIGNIFICANT CHANGE UP (ref 5–17)
APPEARANCE UR: CLEAR — SIGNIFICANT CHANGE UP
APPEARANCE UR: CLEAR — SIGNIFICANT CHANGE UP
AST SERPL-CCNC: 23 U/L — SIGNIFICANT CHANGE UP (ref 15–37)
AST SERPL-CCNC: 23 U/L — SIGNIFICANT CHANGE UP (ref 15–37)
BACTERIA # UR AUTO: NEGATIVE /HPF — SIGNIFICANT CHANGE UP
BACTERIA # UR AUTO: NEGATIVE /HPF — SIGNIFICANT CHANGE UP
BASOPHILS # BLD AUTO: 0.03 K/UL — SIGNIFICANT CHANGE UP (ref 0–0.2)
BASOPHILS # BLD AUTO: 0.03 K/UL — SIGNIFICANT CHANGE UP (ref 0–0.2)
BASOPHILS NFR BLD AUTO: 0.3 % — SIGNIFICANT CHANGE UP (ref 0–2)
BASOPHILS NFR BLD AUTO: 0.3 % — SIGNIFICANT CHANGE UP (ref 0–2)
BILIRUB SERPL-MCNC: 0.5 MG/DL — SIGNIFICANT CHANGE UP (ref 0.2–1.2)
BILIRUB SERPL-MCNC: 0.5 MG/DL — SIGNIFICANT CHANGE UP (ref 0.2–1.2)
BILIRUB UR-MCNC: NEGATIVE — SIGNIFICANT CHANGE UP
BILIRUB UR-MCNC: NEGATIVE — SIGNIFICANT CHANGE UP
BUN SERPL-MCNC: 19 MG/DL — SIGNIFICANT CHANGE UP (ref 7–23)
BUN SERPL-MCNC: 19 MG/DL — SIGNIFICANT CHANGE UP (ref 7–23)
CALCIUM SERPL-MCNC: 9.3 MG/DL — SIGNIFICANT CHANGE UP (ref 8.5–10.1)
CALCIUM SERPL-MCNC: 9.3 MG/DL — SIGNIFICANT CHANGE UP (ref 8.5–10.1)
CAST: 0 /LPF — SIGNIFICANT CHANGE UP (ref 0–4)
CAST: 0 /LPF — SIGNIFICANT CHANGE UP (ref 0–4)
CHLORIDE SERPL-SCNC: 112 MMOL/L — HIGH (ref 96–108)
CHLORIDE SERPL-SCNC: 112 MMOL/L — HIGH (ref 96–108)
CO2 SERPL-SCNC: 25 MMOL/L — SIGNIFICANT CHANGE UP (ref 22–31)
CO2 SERPL-SCNC: 25 MMOL/L — SIGNIFICANT CHANGE UP (ref 22–31)
COLOR SPEC: YELLOW — SIGNIFICANT CHANGE UP
COLOR SPEC: YELLOW — SIGNIFICANT CHANGE UP
CREAT SERPL-MCNC: 0.88 MG/DL — SIGNIFICANT CHANGE UP (ref 0.5–1.3)
CREAT SERPL-MCNC: 0.88 MG/DL — SIGNIFICANT CHANGE UP (ref 0.5–1.3)
DIFF PNL FLD: ABNORMAL
DIFF PNL FLD: ABNORMAL
EGFR: 66 ML/MIN/1.73M2 — SIGNIFICANT CHANGE UP
EGFR: 66 ML/MIN/1.73M2 — SIGNIFICANT CHANGE UP
EOSINOPHIL # BLD AUTO: 0.05 K/UL — SIGNIFICANT CHANGE UP (ref 0–0.5)
EOSINOPHIL # BLD AUTO: 0.05 K/UL — SIGNIFICANT CHANGE UP (ref 0–0.5)
EOSINOPHIL NFR BLD AUTO: 0.5 % — SIGNIFICANT CHANGE UP (ref 0–6)
EOSINOPHIL NFR BLD AUTO: 0.5 % — SIGNIFICANT CHANGE UP (ref 0–6)
GLUCOSE SERPL-MCNC: 109 MG/DL — HIGH (ref 70–99)
GLUCOSE SERPL-MCNC: 109 MG/DL — HIGH (ref 70–99)
GLUCOSE UR QL: NEGATIVE MG/DL — SIGNIFICANT CHANGE UP
GLUCOSE UR QL: NEGATIVE MG/DL — SIGNIFICANT CHANGE UP
HCT VFR BLD CALC: 38.8 % — SIGNIFICANT CHANGE UP (ref 34.5–45)
HCT VFR BLD CALC: 38.8 % — SIGNIFICANT CHANGE UP (ref 34.5–45)
HGB BLD-MCNC: 12.9 G/DL — SIGNIFICANT CHANGE UP (ref 11.5–15.5)
HGB BLD-MCNC: 12.9 G/DL — SIGNIFICANT CHANGE UP (ref 11.5–15.5)
IMM GRANULOCYTES NFR BLD AUTO: 0.4 % — SIGNIFICANT CHANGE UP (ref 0–0.9)
IMM GRANULOCYTES NFR BLD AUTO: 0.4 % — SIGNIFICANT CHANGE UP (ref 0–0.9)
KETONES UR-MCNC: NEGATIVE MG/DL — SIGNIFICANT CHANGE UP
KETONES UR-MCNC: NEGATIVE MG/DL — SIGNIFICANT CHANGE UP
LEUKOCYTE ESTERASE UR-ACNC: NEGATIVE — SIGNIFICANT CHANGE UP
LEUKOCYTE ESTERASE UR-ACNC: NEGATIVE — SIGNIFICANT CHANGE UP
LYMPHOCYTES # BLD AUTO: 2.41 K/UL — SIGNIFICANT CHANGE UP (ref 1–3.3)
LYMPHOCYTES # BLD AUTO: 2.41 K/UL — SIGNIFICANT CHANGE UP (ref 1–3.3)
LYMPHOCYTES # BLD AUTO: 22 % — SIGNIFICANT CHANGE UP (ref 13–44)
LYMPHOCYTES # BLD AUTO: 22 % — SIGNIFICANT CHANGE UP (ref 13–44)
MAGNESIUM SERPL-MCNC: 2 MG/DL — SIGNIFICANT CHANGE UP (ref 1.6–2.6)
MAGNESIUM SERPL-MCNC: 2 MG/DL — SIGNIFICANT CHANGE UP (ref 1.6–2.6)
MCHC RBC-ENTMCNC: 32.6 PG — SIGNIFICANT CHANGE UP (ref 27–34)
MCHC RBC-ENTMCNC: 32.6 PG — SIGNIFICANT CHANGE UP (ref 27–34)
MCHC RBC-ENTMCNC: 33.2 GM/DL — SIGNIFICANT CHANGE UP (ref 32–36)
MCHC RBC-ENTMCNC: 33.2 GM/DL — SIGNIFICANT CHANGE UP (ref 32–36)
MCV RBC AUTO: 98 FL — SIGNIFICANT CHANGE UP (ref 80–100)
MCV RBC AUTO: 98 FL — SIGNIFICANT CHANGE UP (ref 80–100)
MONOCYTES # BLD AUTO: 0.48 K/UL — SIGNIFICANT CHANGE UP (ref 0–0.9)
MONOCYTES # BLD AUTO: 0.48 K/UL — SIGNIFICANT CHANGE UP (ref 0–0.9)
MONOCYTES NFR BLD AUTO: 4.4 % — SIGNIFICANT CHANGE UP (ref 2–14)
MONOCYTES NFR BLD AUTO: 4.4 % — SIGNIFICANT CHANGE UP (ref 2–14)
NEUTROPHILS # BLD AUTO: 7.95 K/UL — HIGH (ref 1.8–7.4)
NEUTROPHILS # BLD AUTO: 7.95 K/UL — HIGH (ref 1.8–7.4)
NEUTROPHILS NFR BLD AUTO: 72.4 % — SIGNIFICANT CHANGE UP (ref 43–77)
NEUTROPHILS NFR BLD AUTO: 72.4 % — SIGNIFICANT CHANGE UP (ref 43–77)
NITRITE UR-MCNC: NEGATIVE — SIGNIFICANT CHANGE UP
NITRITE UR-MCNC: NEGATIVE — SIGNIFICANT CHANGE UP
PH UR: 6.5 — SIGNIFICANT CHANGE UP (ref 5–8)
PH UR: 6.5 — SIGNIFICANT CHANGE UP (ref 5–8)
PLATELET # BLD AUTO: 240 K/UL — SIGNIFICANT CHANGE UP (ref 150–400)
PLATELET # BLD AUTO: 240 K/UL — SIGNIFICANT CHANGE UP (ref 150–400)
POTASSIUM SERPL-MCNC: 4.4 MMOL/L — SIGNIFICANT CHANGE UP (ref 3.5–5.3)
POTASSIUM SERPL-MCNC: 4.4 MMOL/L — SIGNIFICANT CHANGE UP (ref 3.5–5.3)
POTASSIUM SERPL-SCNC: 4.4 MMOL/L — SIGNIFICANT CHANGE UP (ref 3.5–5.3)
POTASSIUM SERPL-SCNC: 4.4 MMOL/L — SIGNIFICANT CHANGE UP (ref 3.5–5.3)
PROT SERPL-MCNC: 8.1 GM/DL — SIGNIFICANT CHANGE UP (ref 6–8.3)
PROT SERPL-MCNC: 8.1 GM/DL — SIGNIFICANT CHANGE UP (ref 6–8.3)
PROT UR-MCNC: NEGATIVE MG/DL — SIGNIFICANT CHANGE UP
PROT UR-MCNC: NEGATIVE MG/DL — SIGNIFICANT CHANGE UP
RBC # BLD: 3.96 M/UL — SIGNIFICANT CHANGE UP (ref 3.8–5.2)
RBC # BLD: 3.96 M/UL — SIGNIFICANT CHANGE UP (ref 3.8–5.2)
RBC # FLD: 13.4 % — SIGNIFICANT CHANGE UP (ref 10.3–14.5)
RBC # FLD: 13.4 % — SIGNIFICANT CHANGE UP (ref 10.3–14.5)
RBC CASTS # UR COMP ASSIST: 2 /HPF — SIGNIFICANT CHANGE UP (ref 0–4)
RBC CASTS # UR COMP ASSIST: 2 /HPF — SIGNIFICANT CHANGE UP (ref 0–4)
SODIUM SERPL-SCNC: 143 MMOL/L — SIGNIFICANT CHANGE UP (ref 135–145)
SODIUM SERPL-SCNC: 143 MMOL/L — SIGNIFICANT CHANGE UP (ref 135–145)
SP GR SPEC: 1.02 — SIGNIFICANT CHANGE UP (ref 1–1.03)
SP GR SPEC: 1.02 — SIGNIFICANT CHANGE UP (ref 1–1.03)
SQUAMOUS # UR AUTO: 2 /HPF — SIGNIFICANT CHANGE UP (ref 0–5)
SQUAMOUS # UR AUTO: 2 /HPF — SIGNIFICANT CHANGE UP (ref 0–5)
TROPONIN I, HIGH SENSITIVITY RESULT: 7.99 NG/L — SIGNIFICANT CHANGE UP
TROPONIN I, HIGH SENSITIVITY RESULT: 7.99 NG/L — SIGNIFICANT CHANGE UP
UROBILINOGEN FLD QL: 1 MG/DL — SIGNIFICANT CHANGE UP (ref 0.2–1)
UROBILINOGEN FLD QL: 1 MG/DL — SIGNIFICANT CHANGE UP (ref 0.2–1)
WBC # BLD: 10.96 K/UL — HIGH (ref 3.8–10.5)
WBC # BLD: 10.96 K/UL — HIGH (ref 3.8–10.5)
WBC # FLD AUTO: 10.96 K/UL — HIGH (ref 3.8–10.5)
WBC # FLD AUTO: 10.96 K/UL — HIGH (ref 3.8–10.5)
WBC UR QL: 0 /HPF — SIGNIFICANT CHANGE UP (ref 0–5)
WBC UR QL: 0 /HPF — SIGNIFICANT CHANGE UP (ref 0–5)

## 2023-11-28 PROCEDURE — 36415 COLL VENOUS BLD VENIPUNCTURE: CPT

## 2023-11-28 PROCEDURE — 93010 ELECTROCARDIOGRAM REPORT: CPT

## 2023-11-28 PROCEDURE — 84484 ASSAY OF TROPONIN QUANT: CPT

## 2023-11-28 PROCEDURE — 81001 URINALYSIS AUTO W/SCOPE: CPT

## 2023-11-28 PROCEDURE — 71045 X-RAY EXAM CHEST 1 VIEW: CPT

## 2023-11-28 PROCEDURE — 70450 CT HEAD/BRAIN W/O DYE: CPT | Mod: MA

## 2023-11-28 PROCEDURE — 80053 COMPREHEN METABOLIC PANEL: CPT

## 2023-11-28 PROCEDURE — 83735 ASSAY OF MAGNESIUM: CPT

## 2023-11-28 PROCEDURE — 85025 COMPLETE CBC W/AUTO DIFF WBC: CPT

## 2023-11-28 PROCEDURE — 70450 CT HEAD/BRAIN W/O DYE: CPT | Mod: 26,MA

## 2023-11-28 PROCEDURE — 71045 X-RAY EXAM CHEST 1 VIEW: CPT | Mod: 26

## 2023-11-28 PROCEDURE — 99285 EMERGENCY DEPT VISIT HI MDM: CPT | Mod: 25

## 2023-11-28 PROCEDURE — 93005 ELECTROCARDIOGRAM TRACING: CPT

## 2023-11-28 PROCEDURE — 99285 EMERGENCY DEPT VISIT HI MDM: CPT

## 2023-11-28 RX ORDER — SODIUM CHLORIDE 9 MG/ML
1000 INJECTION INTRAMUSCULAR; INTRAVENOUS; SUBCUTANEOUS ONCE
Refills: 0 | Status: COMPLETED | OUTPATIENT
Start: 2023-11-28 | End: 2023-11-28

## 2023-11-28 RX ADMIN — SODIUM CHLORIDE 1000 MILLILITER(S): 9 INJECTION INTRAMUSCULAR; INTRAVENOUS; SUBCUTANEOUS at 20:56

## 2023-11-28 NOTE — ED ADULT TRIAGE NOTE - CHIEF COMPLAINT QUOTE
Pt to Ed c/o few days of high blood pressure 180 systolic at home with feeling lightheaded. denies CP, palpitations or SOB at this time. PMH HTN

## 2023-11-28 NOTE — ED PROVIDER NOTE - CLINICAL SUMMARY MEDICAL DECISION MAKING FREE TEXT BOX
83y Female with right arm weakness in the setting of HTN. No chest pain, shortness of breath, palpitations. Hemodynamically stable, neurovascualrly intact. Differential diagnosis includes but not limited to CVA, electrolyte derangement, anemia. 83y Female with right arm weakness in the setting of HTN. No chest pain, shortness of breath, palpitations. Hemodynamically stable, neurovascualrly intact with no focal weakness. 5/5 strength throughout with no sensation deficits. Well out of TNK window due to onset of symptoms with NIHSS 0. Differential diagnosis includes but not limited to CVA, electrolyte derangement, anemia.

## 2023-11-28 NOTE — ED ADULT NURSE NOTE - NSFALLHARMRISKINTERV_ED_ALL_ED

## 2023-11-28 NOTE — ED PROVIDER NOTE - PATIENT PORTAL LINK FT
You can access the FollowMyHealth Patient Portal offered by [facility] by registering at the following website: [email or web address]. By joining [employer]’s FollowMyHealth portal, you will also be able to view your health information using other applications (apps) compatible with our system.

## 2023-11-28 NOTE — ED PROVIDER NOTE - PHYSICAL EXAMINATION
General: Well appearing in no acute distress, alert and cooperative  Head: Normocephalic, atraumatic  Eyes: PERRLA, no conjunctival injection, no scleral icterus, EOMI  ENMT: Atraumatic external nose and ears  Neck: Soft and supple, full ROM without pain  Cardiac: Regular rate and regular rhythm, no murmurs, peripheral pulses 2+ and symmetric in all extremities, no LE edema.  Resp: Unlabored respiratory effort, lungs CTAB  Abd: Soft, non-tender, non-distended  MSK: Spine midline and non-tender  Skin: Warm and dry  Neuro: AO x 3, moves all extremities symmetrically, Motor strength and sensation grossly intact General: Well appearing in no acute distress, alert and cooperative  Head: Normocephalic, atraumatic  Eyes: PERRLA, no conjunctival injection, no scleral icterus, EOMI  ENMT: Atraumatic external nose and ears  Neck: Soft and supple, full ROM without pain  Cardiac: Regular rate and regular rhythm, no murmurs, peripheral pulses 2+ and symmetric in all extremities, no LE edema.  Resp: Unlabored respiratory effort, lungs CTAB  Abd: Soft, non-tender, non-distended  MSK: Spine midline and non-tender  Skin: Warm and dry  Neuro: AO x 3, moves all extremities symmetrically, CN 2-12 intact, No nystagmus. Normal gait.  Motor strength 5/5 bilaterally UE and LE, sensation grossly intact

## 2023-11-28 NOTE — ED STATDOCS - PROGRESS NOTE DETAILS
83F presents to the ED with elevated BP lightheadedness, Feeling off balance right arm weakness that is new.  Symptoms started about 3 to 4 days ago no chest pain no shortness of breath patient with strokelike symptoms but outside TNK window also outside neurointervention window will workup for CVA labs CT send to main for further workup Travon Munoz M.D., Attending Physician

## 2023-11-28 NOTE — ED PROVIDER NOTE - OBJECTIVE STATEMENT
83y Female with history of HTN presenting with elevated blood pressure at home x 3 days with lightheadedness and right arm weakness. Patient states all symptoms started 3 days ago. Denies fevers, chills, headache, chest pain, palpitations, shortness of breath, cough, nausea, vomiting, diarrhea, hematuria, dysuria, dark stools. 83y Female with history of HTN presenting with elevated blood pressure at home x 3 days with lightheadedness and right arm weakness. Patient states all symptoms started 3 days ago. Denies trauma, fevers, chills, headache, chest pain, palpitations, shortness of breath, cough, nausea, vomiting, diarrhea, hematuria, dysuria, dark stools.

## 2023-11-28 NOTE — ED ADULT NURSE NOTE - OBJECTIVE STATEMENT
Pt presented to the Ed c/o few days of high blood pressure 180 systolic at home with feeling lightheaded. denies CP, palpitations or SOB at this time. PMH HTN and arthritis of the L knee.

## 2023-11-28 NOTE — ED PROVIDER NOTE - CARE PROVIDER_API CALL
Tony Krueger  Neurology  [address]  [place] [address]  Phone: [phone]  Fax: [phone]  Follow Up Time: 1-3 Days

## 2023-11-29 VITALS
HEART RATE: 60 BPM | TEMPERATURE: 98 F | SYSTOLIC BLOOD PRESSURE: 127 MMHG | OXYGEN SATURATION: 99 % | DIASTOLIC BLOOD PRESSURE: 73 MMHG | RESPIRATION RATE: 18 BRPM

## 2024-02-09 NOTE — H&P ADULT - NSHPATTENDINGPLANDISCUSS_GEN_ALL_CORE
Unfortunately she does have a sinusitis.  Going to place her on a Z-Sven to cover for atypical pneumonia as well.  Antibiotics to be taken as ordered.  Symptomatic care as tolerated. Follow up if worsening or persists for more than a week.  Otherwise return for regularly scheduled PE/well visit.   ed staff and patient

## 2024-02-28 ENCOUNTER — EMERGENCY (EMERGENCY)
Facility: HOSPITAL | Age: 84
LOS: 0 days | Discharge: HOME | End: 2024-02-29
Attending: EMERGENCY MEDICINE
Payer: MEDICARE

## 2024-02-28 VITALS — WEIGHT: 175.93 LBS | HEIGHT: 62 IN

## 2024-02-28 DIAGNOSIS — R35.0 FREQUENCY OF MICTURITION: ICD-10-CM

## 2024-02-28 DIAGNOSIS — I10 ESSENTIAL (PRIMARY) HYPERTENSION: ICD-10-CM

## 2024-02-28 DIAGNOSIS — R39.15 URGENCY OF URINATION: ICD-10-CM

## 2024-02-28 DIAGNOSIS — R10.30 LOWER ABDOMINAL PAIN, UNSPECIFIED: ICD-10-CM

## 2024-02-28 DIAGNOSIS — Z98.89 OTHER SPECIFIED POSTPROCEDURAL STATES: Chronic | ICD-10-CM

## 2024-02-28 DIAGNOSIS — E78.5 HYPERLIPIDEMIA, UNSPECIFIED: ICD-10-CM

## 2024-02-28 DIAGNOSIS — M17.0 BILATERAL PRIMARY OSTEOARTHRITIS OF KNEE: ICD-10-CM

## 2024-02-28 DIAGNOSIS — R10.2 PELVIC AND PERINEAL PAIN: ICD-10-CM

## 2024-02-28 DIAGNOSIS — Z98.49 CATARACT EXTRACTION STATUS, UNSPECIFIED EYE: Chronic | ICD-10-CM

## 2024-02-28 LAB
ALBUMIN SERPL ELPH-MCNC: 3.5 G/DL — SIGNIFICANT CHANGE UP (ref 3.3–5)
ALP SERPL-CCNC: 109 U/L — SIGNIFICANT CHANGE UP (ref 40–120)
ALT FLD-CCNC: 18 U/L — SIGNIFICANT CHANGE UP (ref 12–78)
ANION GAP SERPL CALC-SCNC: 3 MMOL/L — LOW (ref 5–17)
AST SERPL-CCNC: 19 U/L — SIGNIFICANT CHANGE UP (ref 15–37)
BASOPHILS # BLD AUTO: 0.02 K/UL — SIGNIFICANT CHANGE UP (ref 0–0.2)
BASOPHILS NFR BLD AUTO: 0.2 % — SIGNIFICANT CHANGE UP (ref 0–2)
BILIRUB SERPL-MCNC: 0.4 MG/DL — SIGNIFICANT CHANGE UP (ref 0.2–1.2)
BUN SERPL-MCNC: 14 MG/DL — SIGNIFICANT CHANGE UP (ref 7–23)
CALCIUM SERPL-MCNC: 9.3 MG/DL — SIGNIFICANT CHANGE UP (ref 8.5–10.1)
CHLORIDE SERPL-SCNC: 112 MMOL/L — HIGH (ref 96–108)
CO2 SERPL-SCNC: 29 MMOL/L — SIGNIFICANT CHANGE UP (ref 22–31)
CREAT SERPL-MCNC: 0.86 MG/DL — SIGNIFICANT CHANGE UP (ref 0.5–1.3)
EGFR: 67 ML/MIN/1.73M2 — SIGNIFICANT CHANGE UP
EOSINOPHIL # BLD AUTO: 0.05 K/UL — SIGNIFICANT CHANGE UP (ref 0–0.5)
EOSINOPHIL NFR BLD AUTO: 0.5 % — SIGNIFICANT CHANGE UP (ref 0–6)
GLUCOSE SERPL-MCNC: 112 MG/DL — HIGH (ref 70–99)
HCT VFR BLD CALC: 36.5 % — SIGNIFICANT CHANGE UP (ref 34.5–45)
HGB BLD-MCNC: 11.9 G/DL — SIGNIFICANT CHANGE UP (ref 11.5–15.5)
IMM GRANULOCYTES NFR BLD AUTO: 0.3 % — SIGNIFICANT CHANGE UP (ref 0–0.9)
LIDOCAIN IGE QN: 32 U/L — SIGNIFICANT CHANGE UP (ref 13–75)
LYMPHOCYTES # BLD AUTO: 2.47 K/UL — SIGNIFICANT CHANGE UP (ref 1–3.3)
LYMPHOCYTES # BLD AUTO: 26.7 % — SIGNIFICANT CHANGE UP (ref 13–44)
MCHC RBC-ENTMCNC: 32.6 GM/DL — SIGNIFICANT CHANGE UP (ref 32–36)
MCHC RBC-ENTMCNC: 33 PG — SIGNIFICANT CHANGE UP (ref 27–34)
MCV RBC AUTO: 101.1 FL — HIGH (ref 80–100)
MONOCYTES # BLD AUTO: 0.62 K/UL — SIGNIFICANT CHANGE UP (ref 0–0.9)
MONOCYTES NFR BLD AUTO: 6.7 % — SIGNIFICANT CHANGE UP (ref 2–14)
NEUTROPHILS # BLD AUTO: 6.07 K/UL — SIGNIFICANT CHANGE UP (ref 1.8–7.4)
NEUTROPHILS NFR BLD AUTO: 65.6 % — SIGNIFICANT CHANGE UP (ref 43–77)
PLATELET # BLD AUTO: 246 K/UL — SIGNIFICANT CHANGE UP (ref 150–400)
POTASSIUM SERPL-MCNC: 3.8 MMOL/L — SIGNIFICANT CHANGE UP (ref 3.5–5.3)
POTASSIUM SERPL-SCNC: 3.8 MMOL/L — SIGNIFICANT CHANGE UP (ref 3.5–5.3)
PROT SERPL-MCNC: 7.7 GM/DL — SIGNIFICANT CHANGE UP (ref 6–8.3)
RBC # BLD: 3.61 M/UL — LOW (ref 3.8–5.2)
RBC # FLD: 13.5 % — SIGNIFICANT CHANGE UP (ref 10.3–14.5)
SODIUM SERPL-SCNC: 144 MMOL/L — SIGNIFICANT CHANGE UP (ref 135–145)
WBC # BLD: 9.26 K/UL — SIGNIFICANT CHANGE UP (ref 3.8–10.5)
WBC # FLD AUTO: 9.26 K/UL — SIGNIFICANT CHANGE UP (ref 3.8–10.5)

## 2024-02-28 PROCEDURE — 99284 EMERGENCY DEPT VISIT MOD MDM: CPT | Mod: 25

## 2024-02-28 PROCEDURE — 83690 ASSAY OF LIPASE: CPT

## 2024-02-28 PROCEDURE — 85025 COMPLETE CBC W/AUTO DIFF WBC: CPT

## 2024-02-28 PROCEDURE — 99284 EMERGENCY DEPT VISIT MOD MDM: CPT

## 2024-02-28 PROCEDURE — 96374 THER/PROPH/DIAG INJ IV PUSH: CPT

## 2024-02-28 PROCEDURE — 87040 BLOOD CULTURE FOR BACTERIA: CPT

## 2024-02-28 PROCEDURE — 81003 URINALYSIS AUTO W/O SCOPE: CPT

## 2024-02-28 PROCEDURE — 87086 URINE CULTURE/COLONY COUNT: CPT

## 2024-02-28 PROCEDURE — 36415 COLL VENOUS BLD VENIPUNCTURE: CPT

## 2024-02-28 PROCEDURE — 93010 ELECTROCARDIOGRAM REPORT: CPT

## 2024-02-28 PROCEDURE — 93005 ELECTROCARDIOGRAM TRACING: CPT

## 2024-02-28 PROCEDURE — 80053 COMPREHEN METABOLIC PANEL: CPT

## 2024-02-28 RX ORDER — ACETAMINOPHEN 500 MG
1000 TABLET ORAL ONCE
Refills: 0 | Status: COMPLETED | OUTPATIENT
Start: 2024-02-28 | End: 2024-02-28

## 2024-02-28 RX ADMIN — Medication 400 MILLIGRAM(S): at 21:06

## 2024-02-28 NOTE — ED PROVIDER NOTE - PATIENT PORTAL LINK FT
You can access the FollowMyHealth Patient Portal offered by [facility] by registering at the following website: [email or web address]. By joining [employer]’s FollowMyHealth portal, you will also be able to view your health information using other applications (apps) compatible with our system.

## 2024-02-28 NOTE — ED PROVIDER NOTE - MUSCULOSKELETAL, MLM
Spine appears normal, range of motion is not limited, no muscle or joint tenderness.  LICEA x 4, no focal swelling/ tenderness.

## 2024-02-28 NOTE — ED PROVIDER NOTE - CLINICAL SUMMARY MEDICAL DECISION MAKING FREE TEXT BOX
83-year-old AA female, PMH of HTN, HLD, legally blind, OA B/L knees, brought in by private car complaining of couple of days lower abdominal/suprapubic pain alleviated by urination.  Associated urinary urgency and frequency, ? dysuria.  + Suprapubic TTP on exam, pt in NAD.    Plan: labs incl. lipase, urine w/ culture.  Observe, reassess.    00:00, C MD Aleah:  labs unremarkable.  Urine still pending.  Case endorsed to Dr. Briceño to f/u U/A for r/o suspected UTI. 83-year-old AA female, PMH of HTN, HLD, legally blind, OA B/L knees, brought in by private car complaining of couple of days lower abdominal/suprapubic pain alleviated by urination.  Associated urinary urgency and frequency, ? dysuria.  + Suprapubic TTP on exam, pt in NAD.    Plan: labs incl. lipase, urine w/ culture.  Observe, reassess.    00:00, C MD Aleah:  labs unremarkable.  Urine still pending.  Case endorsed to Dr. Briceño to f/u U/A for r/o suspected UTI.    0119: Keyanna DUBOSE: Signout received from Dr. Bullard pending urinalysis.  UA result noted.  Not suggestive of UTI.  Will discharge patient home. 83-year-old AA female, PMH of HTN, HLD, legally blind, OA B/L knees, brought in by private car complaining of couple of days lower abdominal/suprapubic pain alleviated by urination.  Associated urinary urgency and frequency, ? dysuria.  + Suprapubic TTP on exam, pt in NAD, no active abd. pain.    Plan: labs incl. lipase, urine w/ culture.  Observe, reassess.    00:00, GRICELDA Bullard MD:  labs unremarkable.  Urine still pending.  Case endorsed to Dr. Briceño to f/u U/A for r/o suspected UTI.    0119: Keyanna DUBOSE: Signout received from Dr. Bullard pending urinalysis.  UA result noted.  Not suggestive of UTI.  Will discharge patient home.

## 2024-02-28 NOTE — ED PROVIDER NOTE - NEUROLOGICAL, MLM
Alert and oriented, no focal deficits, no motor or sensory deficits.  CNs intac t except for known poor eyesight.  Normal speech. Alert and oriented, no focal deficits, no motor or sensory deficits.  CNs intact except for known poor eyesight.  Normal speech.

## 2024-02-28 NOTE — ED ADULT TRIAGE NOTE - CHIEF COMPLAINT QUOTE
Patient presents to ED with family complaining of abdominal pain after urinating and fatigue for several days. Pt denies fever/chills, nausea/vomiting.

## 2024-02-28 NOTE — ED ADULT NURSE NOTE - NSFALLRISKASMTTYPE_ED_ALL_ED
med staff , Dr King ,spoke to the wife Elaine on a phone [phone] 12/10 ,today she didn't  the phone and no answering machine Initial (On Arrival)

## 2024-02-28 NOTE — ED ADULT NURSE NOTE - NSFALLHARMRISKINTERV_ED_ALL_ED

## 2024-02-28 NOTE — ED PROVIDER NOTE - OBJECTIVE STATEMENT
83-year-old AA female, PMH of HTN, HLD, legally blind, OA B/L knees, brought in by private car complaining of couple of days lower abdominal/suprapubic pain alleviated by urination.  Associated urinary urgency and frequency, ? dysuria.  Patient denies fever, chills, SPENCER, N/V/D, chest pain, shortness of breath.  No known recent fall.  PCP: JONA Obrien at [facility] 83-year-old AA female, PMH of HTN, HLD, legally blind, OA B/L knees, brought in by private car complaining of couple of days lower abdominal/suprapubic pain alleviated by urination.  Associated urinary urgency and frequency, ? dysuria.  Patient denies fever, chills, SPENCER, N/V/D, chest pain, shortness of breath.  No known recent fall.  Pt denies abd pain during current ED eval.  PCP: JONA Obrien at [facility]

## 2024-02-28 NOTE — ED ADULT NURSE NOTE - OBJECTIVE STATEMENT
Patient is a 82 y/o female who presents from the ED with c/o abdominal pain. Pt states the pain started 2 days ago and is radiating down her right leg and fatigue for several days. Pt denies fever/chills, nausea/vomiting.

## 2024-02-28 NOTE — ED ADULT NURSE NOTE - FINAL NURSING ELECTRONIC SIGNATURE
Spoke with Kimberly Wyatt pt now accepted at [facility] --SPD in ER    29-Feb-2024 01:48

## 2024-02-28 NOTE — ED PROVIDER NOTE - NSFOLLOWUPINSTRUCTIONS_ED_ALL_ED_FT
Please follow-up with your primary care physician in the next few days for routine follow-up care at the [facility].  Please return for any new or worsening symptoms.

## 2024-02-29 VITALS
DIASTOLIC BLOOD PRESSURE: 69 MMHG | OXYGEN SATURATION: 100 % | HEART RATE: 61 BPM | SYSTOLIC BLOOD PRESSURE: 147 MMHG | TEMPERATURE: 98 F | RESPIRATION RATE: 18 BRPM

## 2024-02-29 LAB
APPEARANCE UR: CLEAR — SIGNIFICANT CHANGE UP
BILIRUB UR-MCNC: NEGATIVE — SIGNIFICANT CHANGE UP
COLOR SPEC: YELLOW — SIGNIFICANT CHANGE UP
DIFF PNL FLD: NEGATIVE — SIGNIFICANT CHANGE UP
GLUCOSE UR QL: NEGATIVE MG/DL — SIGNIFICANT CHANGE UP
KETONES UR-MCNC: NEGATIVE MG/DL — SIGNIFICANT CHANGE UP
LEUKOCYTE ESTERASE UR-ACNC: NEGATIVE — SIGNIFICANT CHANGE UP
NITRITE UR-MCNC: NEGATIVE — SIGNIFICANT CHANGE UP
PH UR: 7 — SIGNIFICANT CHANGE UP (ref 5–8)
PROT UR-MCNC: NEGATIVE MG/DL — SIGNIFICANT CHANGE UP
SP GR SPEC: 1.01 — SIGNIFICANT CHANGE UP (ref 1–1.03)
UROBILINOGEN FLD QL: 0.2 MG/DL — SIGNIFICANT CHANGE UP (ref 0.2–1)

## 2024-03-01 LAB
CULTURE RESULTS: SIGNIFICANT CHANGE UP
SPECIMEN SOURCE: SIGNIFICANT CHANGE UP

## 2024-03-26 NOTE — ED ADULT TRIAGE NOTE - IDEAL BODY WEIGHT(KG)
46

## 2024-03-26 NOTE — PROVIDER CONTACT NOTE (OTHER) - NAME OF MD/NP/PA/DO NOTIFIED:
A1c at goal  Continue Janumet  Will discuss Ozempic with endo at apt next month  On statin  Eye exam up to date     Dr. Piper (cardiology)

## 2024-04-08 PROBLEM — C54.1 ENDOMETRIAL CANCER: Status: ACTIVE | Noted: 2021-01-12

## 2024-04-09 ENCOUNTER — APPOINTMENT (OUTPATIENT)
Dept: GYNECOLOGIC ONCOLOGY | Facility: CLINIC | Age: 84
End: 2024-04-09
Payer: MEDICARE

## 2024-04-09 VITALS
HEART RATE: 69 BPM | BODY MASS INDEX: 32.1 KG/M2 | SYSTOLIC BLOOD PRESSURE: 179 MMHG | WEIGHT: 170 LBS | HEIGHT: 61 IN | DIASTOLIC BLOOD PRESSURE: 79 MMHG

## 2024-04-09 DIAGNOSIS — C54.1 MALIGNANT NEOPLASM OF ENDOMETRIUM: ICD-10-CM

## 2024-04-09 DIAGNOSIS — R10.13 EPIGASTRIC PAIN: ICD-10-CM

## 2024-04-09 DIAGNOSIS — R10.9 UNSPECIFIED ABDOMINAL PAIN: ICD-10-CM

## 2024-04-09 PROCEDURE — 99213 OFFICE O/P EST LOW 20 MIN: CPT

## 2024-04-10 ENCOUNTER — RESULT REVIEW (OUTPATIENT)
Age: 84
End: 2024-04-10

## 2024-05-04 ENCOUNTER — APPOINTMENT (OUTPATIENT)
Dept: CT IMAGING | Facility: CLINIC | Age: 84
End: 2024-05-04
Payer: MEDICARE

## 2024-05-04 ENCOUNTER — OUTPATIENT (OUTPATIENT)
Dept: OUTPATIENT SERVICES | Facility: HOSPITAL | Age: 84
LOS: 1 days | End: 2024-05-04
Payer: MEDICARE

## 2024-05-04 DIAGNOSIS — R10.9 UNSPECIFIED ABDOMINAL PAIN: ICD-10-CM

## 2024-05-04 DIAGNOSIS — Z00.8 ENCOUNTER FOR OTHER GENERAL EXAMINATION: ICD-10-CM

## 2024-05-04 DIAGNOSIS — R10.13 EPIGASTRIC PAIN: ICD-10-CM

## 2024-05-04 DIAGNOSIS — C54.1 MALIGNANT NEOPLASM OF ENDOMETRIUM: ICD-10-CM

## 2024-05-04 DIAGNOSIS — Z98.89 OTHER SPECIFIED POSTPROCEDURAL STATES: Chronic | ICD-10-CM

## 2024-05-04 DIAGNOSIS — Z98.49 CATARACT EXTRACTION STATUS, UNSPECIFIED EYE: Chronic | ICD-10-CM

## 2024-05-04 PROCEDURE — 74177 CT ABD & PELVIS W/CONTRAST: CPT | Mod: 26

## 2024-05-04 PROCEDURE — 74177 CT ABD & PELVIS W/CONTRAST: CPT

## 2024-05-07 NOTE — REASON FOR VISIT
[FreeTextEntry1] : EMC followup - poor compliance with followup recommendations  PATIENT WAS A NO-SHOW FOR 11/15/22 and 1/17/23 APPTS**

## 2024-05-07 NOTE — LETTER BODY
[FreeTextEntry2] : She is clinically without evidence of disease. She will return to see me in 6 months for surveillance.  I will keep you updated on her progress. Thank you again for referring this ernie patient.

## 2024-05-07 NOTE — PHYSICAL EXAM
[de-identified] : declined breast exam today [de-identified] : no lesions or nodules [de-identified] : well healed vaginal cuff.  No lesions [de-identified] : cuff mobile, NT [de-identified] : normal sphincter tone, smooth rectovaginal septum, no cul-de-sac nodules.

## 2024-05-07 NOTE — HISTORY OF PRESENT ILLNESS
[FreeTextEntry1] : Ms. Farr is a  female initially referred from the [facility] for an SENTHIL pap smear and PMB; she subsequently underwent Robotic TLH/BSO/SLND at  on 21. 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 Benign peritoneal cytology.  MMR protein expression: intact  GYN Oncology Tumor Board Consensus 3/3/21:  Active Surveillance.   2020-  PREOP Pap- SENTHIL; + HRHPV; plan for vaginal cytology 1-2 years postop  SH: she is originally from [place]; ; nonsmoker.   Pre Op - Imagin21 CT Abdomen/Pelvis ([facility]) No incidental findings requiring follow up.    She hasnt been here in 2 years but decided to return due to c/o persistent epigastric discomfort and feeling "emptiness in the stomach" despite eating. She hasnt had this evaluated by GI; she mentioned it to her PCP but reports she hasnt had any tests. Denies vaginal bleeding, changes to bowel or bladder habits.  Denies unintentional weight loss or gain or any other associated signs or symptoms. .  HM: Pap- see above Mammo- ; breast health per PCP; 22 : BIRADS0;  diagnostic mammo 3/17/22 - BIRADS [facility]OTT- biopsy is recommended; she is very reluctant. We discussed it again at length today and she is aware that she may have undiagnosed breast cancer. I offered followup imaging however she AGAIN declines at the current time. She states that she has no breast complaints and doesnt need it. She states her PCPO recently performed breast exam and it was normal.  Colonoscopy- 2018 negative per patient; she is unsure if she has had an upper endoscopy in past; she cannot recall name of her GI.   Referred by (GYN) Lucio Perez CNM PCP:  [facility] : none

## 2024-05-07 NOTE — DISCUSSION/SUMMARY
[Reviewed Clinical Lab Test(s)] : Results of clinical tests were reviewed. [Reviewed Radiology Report(s)] : Radiology reports were reviewed. [Discuss Tests w/Referring Providers] : Results of labs/radiology studies and the treatment recommendations were discussed with performing/referring physician. [Discuss Alternatives/Risks/Benefits w/Patient] : All alternatives, risks, and benefits were discussed with the patient/family and all questions were answered.  Patient expressed good understanding and appreciates the importance of follow up as recommended. [FreeTextEntry1] : - CT ordered for further evaluation; (scheduled for 5/4) ;GI referral will likely be needed (addendum: CT is unremarkable; GI eval recomended. Patient notified via CT2 tasklist. LDS) - The risk of recurrence, signs and symptoms and surveillance plan were reviewed in detail. - RAY reviewed. I again explained that I am very concerned about her refusal to have a biopsy of the suspicious breast findings; we discussed her previous experience and reasons for refusal however she will not agree to have it done. I offered followup imaging, but she found the last mammogram (diagnostic) painful and will not do that again either. I have previously advised her to see a breast surgeon and gave her referral info to Dr. Fisher, I implored her to at least have a consultation to review her recommendations. I again reviewed the breast imaging reports with her, the differential diagnosis and the risk of untreated cancer which can be a threat to her life. - RAY reviewed - She was advised to see me in 6 months for endometrial cancer followup. - All questions were answered to her apparent satisfaction.

## 2024-05-07 NOTE — ASSESSMENT
[FreeTextEntry1] : 84y/o with h/o early-stage EMC with poor followup, benign exam. Recent abd pain and also refusal to follow up with breast imaging.

## 2024-05-16 NOTE — PHYSICAL THERAPY INITIAL EVALUATION ADULT - HEALTH SCREEN CRITERIA
Spoke to pt and advised that his labs from last week show his myeloma is responding very well to new treatment and his light chains have come down nicely and we will continue to monitor labs and treatment. Pt verbalized thanks for treatment.       
yes

## 2024-08-18 ENCOUNTER — NON-APPOINTMENT (OUTPATIENT)
Age: 84
End: 2024-08-18

## 2024-08-19 ENCOUNTER — EMERGENCY (EMERGENCY)
Facility: HOSPITAL | Age: 84
LOS: 0 days | Discharge: HOME | End: 2024-08-19
Attending: STUDENT IN AN ORGANIZED HEALTH CARE EDUCATION/TRAINING PROGRAM
Payer: MEDICARE

## 2024-08-19 VITALS
HEART RATE: 63 BPM | DIASTOLIC BLOOD PRESSURE: 60 MMHG | OXYGEN SATURATION: 100 % | TEMPERATURE: 97 F | RESPIRATION RATE: 18 BRPM | WEIGHT: 174.17 LBS | SYSTOLIC BLOOD PRESSURE: 168 MMHG

## 2024-08-19 VITALS
HEART RATE: 58 BPM | SYSTOLIC BLOOD PRESSURE: 132 MMHG | TEMPERATURE: 98 F | DIASTOLIC BLOOD PRESSURE: 58 MMHG | OXYGEN SATURATION: 100 % | RESPIRATION RATE: 17 BRPM

## 2024-08-19 DIAGNOSIS — Z98.89 OTHER SPECIFIED POSTPROCEDURAL STATES: Chronic | ICD-10-CM

## 2024-08-19 DIAGNOSIS — R10.9 UNSPECIFIED ABDOMINAL PAIN: ICD-10-CM

## 2024-08-19 DIAGNOSIS — I10 ESSENTIAL (PRIMARY) HYPERTENSION: ICD-10-CM

## 2024-08-19 DIAGNOSIS — Z98.49 CATARACT EXTRACTION STATUS, UNSPECIFIED EYE: Chronic | ICD-10-CM

## 2024-08-19 DIAGNOSIS — R31.9 HEMATURIA, UNSPECIFIED: ICD-10-CM

## 2024-08-19 DIAGNOSIS — M19.90 UNSPECIFIED OSTEOARTHRITIS, UNSPECIFIED SITE: ICD-10-CM

## 2024-08-19 DIAGNOSIS — E78.5 HYPERLIPIDEMIA, UNSPECIFIED: ICD-10-CM

## 2024-08-19 LAB
ALBUMIN SERPL ELPH-MCNC: 3.7 G/DL — SIGNIFICANT CHANGE UP (ref 3.3–5)
ALP SERPL-CCNC: 120 U/L — SIGNIFICANT CHANGE UP (ref 40–120)
ALT FLD-CCNC: 20 U/L — SIGNIFICANT CHANGE UP (ref 12–78)
ANION GAP SERPL CALC-SCNC: 5 MMOL/L — SIGNIFICANT CHANGE UP (ref 5–17)
APPEARANCE UR: CLEAR — SIGNIFICANT CHANGE UP
AST SERPL-CCNC: 16 U/L — SIGNIFICANT CHANGE UP (ref 15–37)
BACTERIA # UR AUTO: NEGATIVE /HPF — SIGNIFICANT CHANGE UP
BASOPHILS # BLD AUTO: 0.02 K/UL — SIGNIFICANT CHANGE UP (ref 0–0.2)
BASOPHILS NFR BLD AUTO: 0.2 % — SIGNIFICANT CHANGE UP (ref 0–2)
BILIRUB SERPL-MCNC: 0.4 MG/DL — SIGNIFICANT CHANGE UP (ref 0.2–1.2)
BILIRUB UR-MCNC: NEGATIVE — SIGNIFICANT CHANGE UP
BUN SERPL-MCNC: 21 MG/DL — SIGNIFICANT CHANGE UP (ref 7–23)
CALCIUM SERPL-MCNC: 9.6 MG/DL — SIGNIFICANT CHANGE UP (ref 8.5–10.1)
CAST: 1 /LPF — SIGNIFICANT CHANGE UP (ref 0–4)
CHLORIDE SERPL-SCNC: 109 MMOL/L — HIGH (ref 96–108)
CO2 SERPL-SCNC: 26 MMOL/L — SIGNIFICANT CHANGE UP (ref 22–31)
COLOR SPEC: YELLOW — SIGNIFICANT CHANGE UP
CREAT SERPL-MCNC: 0.91 MG/DL — SIGNIFICANT CHANGE UP (ref 0.5–1.3)
DIFF PNL FLD: ABNORMAL
EGFR: 63 ML/MIN/1.73M2 — SIGNIFICANT CHANGE UP
EOSINOPHIL # BLD AUTO: 0.05 K/UL — SIGNIFICANT CHANGE UP (ref 0–0.5)
EOSINOPHIL NFR BLD AUTO: 0.5 % — SIGNIFICANT CHANGE UP (ref 0–6)
GLUCOSE SERPL-MCNC: 104 MG/DL — HIGH (ref 70–99)
GLUCOSE UR QL: NEGATIVE MG/DL — SIGNIFICANT CHANGE UP
HCT VFR BLD CALC: 36.2 % — SIGNIFICANT CHANGE UP (ref 34.5–45)
HGB BLD-MCNC: 12.2 G/DL — SIGNIFICANT CHANGE UP (ref 11.5–15.5)
IMM GRANULOCYTES NFR BLD AUTO: 0.4 % — SIGNIFICANT CHANGE UP (ref 0–0.9)
KETONES UR-MCNC: NEGATIVE MG/DL — SIGNIFICANT CHANGE UP
LEUKOCYTE ESTERASE UR-ACNC: NEGATIVE — SIGNIFICANT CHANGE UP
LIDOCAIN IGE QN: 31 U/L — SIGNIFICANT CHANGE UP (ref 13–75)
LYMPHOCYTES # BLD AUTO: 2.97 K/UL — SIGNIFICANT CHANGE UP (ref 1–3.3)
LYMPHOCYTES # BLD AUTO: 31.6 % — SIGNIFICANT CHANGE UP (ref 13–44)
MCHC RBC-ENTMCNC: 32.7 PG — SIGNIFICANT CHANGE UP (ref 27–34)
MCHC RBC-ENTMCNC: 33.7 GM/DL — SIGNIFICANT CHANGE UP (ref 32–36)
MCV RBC AUTO: 97.1 FL — SIGNIFICANT CHANGE UP (ref 80–100)
MONOCYTES # BLD AUTO: 0.48 K/UL — SIGNIFICANT CHANGE UP (ref 0–0.9)
MONOCYTES NFR BLD AUTO: 5.1 % — SIGNIFICANT CHANGE UP (ref 2–14)
NEUTROPHILS # BLD AUTO: 5.85 K/UL — SIGNIFICANT CHANGE UP (ref 1.8–7.4)
NEUTROPHILS NFR BLD AUTO: 62.2 % — SIGNIFICANT CHANGE UP (ref 43–77)
NITRITE UR-MCNC: NEGATIVE — SIGNIFICANT CHANGE UP
PH UR: 6.5 — SIGNIFICANT CHANGE UP (ref 5–8)
PLATELET # BLD AUTO: 268 K/UL — SIGNIFICANT CHANGE UP (ref 150–400)
POTASSIUM SERPL-MCNC: 4.1 MMOL/L — SIGNIFICANT CHANGE UP (ref 3.5–5.3)
POTASSIUM SERPL-SCNC: 4.1 MMOL/L — SIGNIFICANT CHANGE UP (ref 3.5–5.3)
PROT SERPL-MCNC: 8.1 GM/DL — SIGNIFICANT CHANGE UP (ref 6–8.3)
PROT UR-MCNC: NEGATIVE MG/DL — SIGNIFICANT CHANGE UP
RBC # BLD: 3.73 M/UL — LOW (ref 3.8–5.2)
RBC # FLD: 13.2 % — SIGNIFICANT CHANGE UP (ref 10.3–14.5)
RBC CASTS # UR COMP ASSIST: 2 /HPF — SIGNIFICANT CHANGE UP (ref 0–4)
SODIUM SERPL-SCNC: 140 MMOL/L — SIGNIFICANT CHANGE UP (ref 135–145)
SP GR SPEC: 1.01 — SIGNIFICANT CHANGE UP (ref 1–1.03)
SQUAMOUS # UR AUTO: 1 /HPF — SIGNIFICANT CHANGE UP (ref 0–5)
UROBILINOGEN FLD QL: 1 MG/DL — SIGNIFICANT CHANGE UP (ref 0.2–1)
WBC # BLD: 9.41 K/UL — SIGNIFICANT CHANGE UP (ref 3.8–10.5)
WBC # FLD AUTO: 9.41 K/UL — SIGNIFICANT CHANGE UP (ref 3.8–10.5)
WBC UR QL: 0 /HPF — SIGNIFICANT CHANGE UP (ref 0–5)

## 2024-08-19 PROCEDURE — 74177 CT ABD & PELVIS W/CONTRAST: CPT | Mod: MC

## 2024-08-19 PROCEDURE — 85025 COMPLETE CBC W/AUTO DIFF WBC: CPT

## 2024-08-19 PROCEDURE — 96374 THER/PROPH/DIAG INJ IV PUSH: CPT | Mod: XU

## 2024-08-19 PROCEDURE — 81001 URINALYSIS AUTO W/SCOPE: CPT

## 2024-08-19 PROCEDURE — 36415 COLL VENOUS BLD VENIPUNCTURE: CPT

## 2024-08-19 PROCEDURE — 74177 CT ABD & PELVIS W/CONTRAST: CPT | Mod: 26,MC

## 2024-08-19 PROCEDURE — 99284 EMERGENCY DEPT VISIT MOD MDM: CPT | Mod: 25

## 2024-08-19 PROCEDURE — 99285 EMERGENCY DEPT VISIT HI MDM: CPT

## 2024-08-19 PROCEDURE — 80053 COMPREHEN METABOLIC PANEL: CPT

## 2024-08-19 PROCEDURE — 83690 ASSAY OF LIPASE: CPT

## 2024-08-19 RX ORDER — CEPHALEXIN 250 MG
1 CAPSULE ORAL
Qty: 28 | Refills: 0
Start: 2024-08-19 | End: 2024-08-25

## 2024-08-19 RX ORDER — ACETAMINOPHEN 500 MG
1000 TABLET ORAL ONCE
Refills: 0 | Status: COMPLETED | OUTPATIENT
Start: 2024-08-19 | End: 2024-08-19

## 2024-08-19 RX ADMIN — Medication 400 MILLIGRAM(S): at 20:39

## 2024-08-19 NOTE — ED ADULT TRIAGE NOTE - CHIEF COMPLAINT QUOTE
PT presents to er with complaints of right side pain, went to urgent care and was told urine has microscopic blood and needs to come in for further evaluation, denies fevers, onset this morning, pt brought in by her private aid.

## 2024-08-19 NOTE — ED STATDOCS - PROGRESS NOTE DETAILS
83 y/o female c/o right flank pain. Went to UC today and was found to have microscopic hematuria, sent to the ED for CT. Denies fever, chills, n/v, dysuria.     Well appearing, NAD.   +R CVAT, abd soft, NT, ND without rebound or guarding     Plan for labs, CT, reassess

## 2024-08-19 NOTE — ED STATDOCS - NSFOLLOWUPINSTRUCTIONS_ED_ALL_ED_FT
Follow up with your urologist to make sure microscopic hematuria resolves.    Hematuria, Adult    Hematuria is blood in the urine. Blood may be visible in the urine, or it may be identified with a test. This condition can be caused by infections of the bladder, urethra, kidney, or prostate. Other possible causes include:  Kidney stones.  Cancer of the urinary tract.  Too much calcium in the urine.  Conditions that are passed from parent to child (inherited conditions).  Exercise that requires a lot of energy.  Infections can usually be treated with medicine, and a kidney stone usually will pass through your urine. If neither of these is the cause of your hematuria, more tests may be needed to identify the cause of your symptoms.    It is very important to tell your health care provider about any blood in your urine, even if it is painless or the blood stops without treatment. Blood in the urine, when it happens and then stops and then happens again, can be a symptom of a very serious condition, including cancer. There is no pain in the initial stages of many urinary cancers.    Follow these instructions at home:  Medicines    Take over-the-counter and prescription medicines only as told by your health care provider.  If you were prescribed an antibiotic medicine, take it as told by your health care provider. Do not stop taking the antibiotic even if you start to feel better.  Eating and drinking    Drink enough fluid to keep your urine pale yellow. It is recommended that you drink 3–4 quarts (2.8–3.8 L) a day. If you have been diagnosed with an infection, drinking cranberry juice in addition to large amounts of water is recommended.  Avoid caffeine, tea, and carbonated beverages. These tend to irritate the bladder.  Avoid alcohol because it may irritate the prostate (in males).  General instructions    If you have been diagnosed with a kidney stone, follow your health care provider's instructions about straining your urine to catch the stone.  Empty your bladder often. Avoid holding urine for long periods of time.  If you are female:  After a bowel movement, wipe from front to back and use each piece of toilet paper only once.  Empty your bladder before and after sex.  Pay attention to any changes in your symptoms. Tell your health care provider about any changes or any new symptoms.  It is up to you to get the results of any tests. Ask your health care provider, or the department that is doing the test, when your results will be ready.  Keep all follow-up visits. This is important.  Contact a health care provider if:  You develop back pain.  You have a fever or chills.  You have nausea or vomiting.  Your symptoms do not improve after 3 days.  Your symptoms get worse.  Get help right away if:  You develop severe vomiting and are unable to take medicine without vomiting.  You develop severe pain in your back or abdomen even though you are taking medicine.  You pass a large amount of blood in your urine.  You pass blood clots in your urine.  You feel very weak or like you might faint.  You faint.  Summary  Hematuria is blood in the urine. It has many possible causes.  It is very important that you tell your health care provider about any blood in your urine, even if it is painless or the blood stops without treatment.  Take over-the-counter and prescription medicines only as told by your health care provider.  Drink enough fluid to keep your urine pale yellow.  This information is not intended to replace advice given to you by your health care provider. Make sure you discuss any questions you have with your health care provider.

## 2024-08-19 NOTE — ED STATDOCS - CLINICAL SUMMARY MEDICAL DECISION MAKING FREE TEXT BOX
84-year-old female past medical history of hypertension, hyperlipidemia presents ED complaining of right flank pain that started yesterday.  Patient went to urgent care earlier today and was told she had hematuria and referred to the ER for further evaluation.  Patient on exam is hemodynamically stable and well-appearing but does have right CVA tenderness.  Differential includes not limited to pyelonephritis, renal stone versus other intra-abdominal pathology.  Plan for labs, CT, urine.  Reassess 84-year-old female past medical history of hypertension, hyperlipidemia presents ED complaining of right flank pain that started yesterday.  Patient went to urgent care earlier today and was told she had hematuria and referred to the ER for further evaluation.  Patient on exam is hemodynamically stable and well-appearing but does have right CVA tenderness.  Differential includes not limited to pyelonephritis, renal stone versus other intra-abdominal pathology.  Plan for labs, CT, urine.  Reassess    2115: Pt reassessed, reporting improvement in flank pain. Labs, CT WNL. Trace hematuria with urinary frequency, will treat with antibiotics in abundance of precaution. Pt to follow up with urology outpatient.

## 2024-08-19 NOTE — ED STATDOCS - OBJECTIVE STATEMENT
83 y/o F with PMHx of essential tremor, endometrial CA, HLD, arthritis, DJD, legally blind, HTN presents to the ED c/o R sided flank pain since yesterday. Seen by UC and told her urine had microscopic blood in in it. Told to come to the ED for further eval. Denies fever, dysuria, nausea, vomiting, diarrhea. States she did not notice any blood in her urine. No hx of kidney stones. Pt took Tylenol at 8am.

## 2024-08-19 NOTE — ED STATDOCS - PHYSICAL EXAMINATION
GEN: Patient awake alert NAD.   HEENT: normocephalic, atraumatic, EOMI, no scleral icterus, moist MM  CARDIAC: RRR, S1, S2, no murmur.   PULM: CTA B/L no wheeze, rhonchi, rales.   ABD: soft NT, ND, no rebound no guarding, R CVA tenderness.   MSK: Moving all extremities, no edema. 5/5 strength and full ROM in all extremities.     NEURO: A&Ox3, gait normal, no focal neurological deficits, CN 2-12 grossly intact  SKIN: warm, dry, no rash. GEN: Patient awake alert NAD.   HEENT: normocephalic, atraumatic, EOMI, no scleral icterus, moist MM  CARDIAC: RRR, S1, S2, no murmur.   PULM: CTA B/L no wheeze, rhonchi, rales.   ABD: soft NT, ND, no rebound no guarding, +R CVA tenderness.   MSK: Moving all extremities, no edema. 5/5 strength and full ROM in all extremities.     NEURO: A&Ox3, gait normal, no focal neurological deficits  SKIN: warm, dry, no rash.

## 2024-08-19 NOTE — ED STATDOCS - PATIENT PORTAL LINK FT
You can access the FollowMyHealth Patient Portal offered by [facility] by registering at the following website: [email or web address]. By joining [facility]’s FollowMyHealth portal, you will also be able to view your health information using other applications (apps) compatible with our system.

## 2024-09-23 ENCOUNTER — NON-APPOINTMENT (OUTPATIENT)
Age: 84
End: 2024-09-23

## 2024-09-24 ENCOUNTER — APPOINTMENT (OUTPATIENT)
Dept: NEUROLOGY | Facility: CLINIC | Age: 84
End: 2024-09-24
Payer: MEDICARE

## 2024-09-24 VITALS
HEIGHT: 61 IN | BODY MASS INDEX: 31.15 KG/M2 | HEART RATE: 82 BPM | TEMPERATURE: 98.2 F | DIASTOLIC BLOOD PRESSURE: 75 MMHG | WEIGHT: 165 LBS | SYSTOLIC BLOOD PRESSURE: 167 MMHG

## 2024-09-24 DIAGNOSIS — H81.10 BENIGN PAROXYSMAL VERTIGO, UNSPECIFIED EAR: ICD-10-CM

## 2024-09-24 PROCEDURE — 99203 OFFICE O/P NEW LOW 30 MIN: CPT

## 2024-09-24 NOTE — DISCUSSION/SUMMARY
[FreeTextEntry1] : 84-year-old woman history of hypertension, complaining of postural, kinetic tremors of the upper extremities, examination consistent with essential type tremor.  No evidence on exam of Parkinson disease. Reviewed and discussed treatment, at this time she rather not try any new medications. She is on metoprolol, if not contraindicated, a non cardio selective beta-blocker such as propranolol could be tried instead. 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 Discussed options, will refer to vestibular physical therapy. Return to the office, 6 months.

## 2024-09-24 NOTE — DATA REVIEWED
[de-identified] :     ACC: [id number]     EXAM:  CT BRAIN   ORDERED BY: ELENI BRITO  PROCEDURE DATE:  11/28/2023    INTERPRETATION:  CLINICAL INDICATION: Elevated blood pressure, right arm, weakness, off balance.  TECHNIQUE: Noncontrast CT of the head was performed. Sagittal and coronal reformats were created.  COMPARISON STUDY: 10/19/2019  FINDINGS:  PARENCHYMA AND VENTRICLES: No acute intracranial hemorrhage, mass effect, or midline shift. No hydrocephalus. Scattered foci of white matter hypoattenuation without associated mass effect, nonspecific, likely chronic microvascular disease. Skull base vascular calcifications. EXTRA-AXIAL: No abnormal extraaxial collection. PARANASAL SINUSES: Within normal limits. TYMPANOMASTOID CAVITIES: Within normal limits. ORBITS: Within normal limits. CALVARIUM: Within normal limits. MISCELLANEOUS: None  IMPRESSION: No acute intracranial hemorrhage, mass effect, or midline shift. If symptoms persist, consider MRI for further evaluation if not contraindicated.  --- End of Report ---      NACHO LEONE MD; Attending Radiologist This document has been electronically signed. Nov 28 2023 11:52PM

## 2024-09-24 NOTE — PHYSICAL EXAM
[General Appearance - Alert] : alert [General Appearance - In No Acute Distress] : in no acute distress [Oriented To Time, Place, And Person] : oriented to person, place, and time [Impaired Insight] : insight and judgment were intact [Affect] : the affect was normal [Cranial Nerves Optic (II)] : visual acuity intact bilaterally,  visual fields full to confrontation, pupils equal round and reactive to light [Cranial Nerves Oculomotor (III)] : extraocular motion intact [Cranial Nerves Trigeminal (V)] : facial sensation intact symmetrically [Cranial Nerves Vestibulocochlear (VIII)] : hearing was intact bilaterally [Cranial Nerves Facial (VII)] : face symmetrical [Cranial Nerves Glossopharyngeal (IX)] : tongue and palate midline [Cranial Nerves Accessory (XI - Cranial And Spinal)] : head turning and shoulder shrug symmetric [Cranial Nerves Hypoglossal (XII)] : there was no tongue deviation with protrusion [Motor Tone] : muscle tone was normal in all four extremities [Motor Strength] : muscle strength was normal in all four extremities [Motor Handedness Right-Handed] : the patient is right hand dominant [Sensation Tactile Decrease] : light touch was intact [Tremor] : a tremor present [2+] : Brachioradialis right 2+ [1+] : Ankle jerk left 1+ [Neck Appearance] : the appearance of the neck was normal [] : the neck was supple [Respiration, Rhythm And Depth] : normal respiratory rhythm and effort [Exaggerated Use Of Accessory Muscles For Inspiration] : no accessory muscle use [Auscultation Breath Sounds / Voice Sounds] : lungs were clear to auscultation bilaterally [Chest Palpation] : palpation of the chest revealed no abnormalities [Heart Rate And Rhythm] : heart rate was normal and rhythm regular [Heart Sounds] : normal S1 and S2 [Heart Sounds Gallop] : no gallops [Murmurs] : no murmurs [Arterial Pulses Carotid] : carotid pulses were normal with no bruits [Nonpitting Edema] : nonpitting edema present [___ +] : bilateral [unfilled]+ pitting edema to the ankles [No Spinal Tenderness] : no spinal tenderness [Paresis Pronator Drift Right-Sided] : no pronator drift on the right [Paresis Pronator Drift Left-Sided] : no pronator drift on the left [Motor Strength Upper Extremities Bilaterally] : strength was normal in both upper extremities [Motor Strength Lower Extremities Bilaterally] : strength was normal in both lower extremities [Dysdiadochokinesia Bilaterally] : not present [Coordination - Dysmetria Impaired Finger-to-Nose Bilateral] : not present [FreeTextEntry8] : uses a cane, slightly wide-based gait

## 2024-09-24 NOTE — REVIEW OF SYSTEMS
[Leg Weakness] : leg weakness [Abnormal Sensation] : an abnormal sensation [Dizziness] : dizziness [Difficulty Walking] : difficulty walking [Negative] : Heme/Lymph

## 2024-09-24 NOTE — CONSULT LETTER
[Dear  ___] : Dear  [unfilled], [Consult Letter:] : I had the pleasure of evaluating your patient, [unfilled]. [Please see my note below.] : Please see my note below. [Consult Closing:] : Thank you very much for allowing me to participate in the care of this patient.  If you have any questions, please do not hesitate to contact me. [Sincerely,] : Sincerely, [FreeTextEntry2] : Chelsea Obrien MD [FreeTextEntry3] : Diogo Jorgensen MD

## 2024-09-24 NOTE — HISTORY OF PRESENT ILLNESS
[FreeTextEntry1] : 84-year-old woman right-handed with a history of hypertension, referred for evaluation of tremors of the hands which she has had for several years, right worse than left noted when she is holding an object, or when she tries to use the hands, holding a cup, feed herself, right.  The tremor is worse when she gets anxious, tired fatigue, best when she relaxes rest. Denies any focal weakness, does complain of some numbness of the extremities.  She has had similar tremors for several years she reports her father also had tremors. No prior history of head injury or trauma, no history of asthma, the use of antidepressives, no history of stroke. She also complains of intermittent, postural vertigo sensation of the head spinning, usually brought on by laying down and turning her head to the sides.  Either side can provoke the symptoms, usually last a few seconds then go away.  Has had similar episodes of for several years, not getting any worse.  Does not affect vision, speech, not associated with hearing loss, ringing in the ears no sense of pressure in the ears, no history of migraines.  She does note a prior history of Bell's palsy affecting For evaluation in the system there is a CT of the head performed in 2019 which shows some subcortical white matter changes but otherwise no large vessel disease.

## 2024-09-25 ENCOUNTER — APPOINTMENT (OUTPATIENT)
Dept: UROLOGY | Facility: CLINIC | Age: 84
End: 2024-09-25
Payer: MEDICARE

## 2024-09-25 VITALS
BODY MASS INDEX: 31.15 KG/M2 | HEIGHT: 61 IN | SYSTOLIC BLOOD PRESSURE: 164 MMHG | OXYGEN SATURATION: 99 % | HEART RATE: 83 BPM | DIASTOLIC BLOOD PRESSURE: 78 MMHG | RESPIRATION RATE: 17 BRPM | WEIGHT: 165 LBS

## 2024-09-25 DIAGNOSIS — R10.9 UNSPECIFIED ABDOMINAL PAIN: ICD-10-CM

## 2024-09-25 DIAGNOSIS — R31.29 OTHER MICROSCOPIC HEMATURIA: ICD-10-CM

## 2024-09-25 DIAGNOSIS — N28.1 CYST OF KIDNEY, ACQUIRED: ICD-10-CM

## 2024-09-25 PROCEDURE — 99204 OFFICE O/P NEW MOD 45 MIN: CPT

## 2024-09-25 NOTE — END OF VISIT
[FreeTextEntry3] : Patient was seen with Nurse Practitioner and examined by me. Agree with above note, where necessary edits were made.  Parts of this note were generated by using Dragon medical dictation software.  A reasonable effort was made to proofread and correct its contents, but typos and mistakes may still remain.If there are any questions or points of clarification needed, please contact my office.   Prior to appointment and during encounter with patient extensive medical records were reviewed including but not limited to, hospital records, outpatient records, imaging results and lab data if available. During this appointment the patient was examined, diagnoses were discussed and explained in a face-to-face manner. In addition, extensive time was spent reviewing aforementioned diagnostic studies. Counseling including test results, differential diagnoses, treatment options, risk and benefits, lifestyle changes, current condition, and current medications was performed. Patient's questions and concerns were addressed. Patient verbalized understanding of the treatment plan. Time spent is for reviewing chart, labs and images if available, counseling and care coordination.  [Time Spent: ___ minutes] : I have spent [unfilled] minutes of time on the encounter which excludes teaching and separately reported services.

## 2024-09-25 NOTE — PHYSICAL EXAM
[Normal Appearance] : normal appearance [Well Groomed] : well groomed [General Appearance - In No Acute Distress] : no acute distress [] : no respiratory distress [Respiration, Rhythm And Depth] : normal respiratory rhythm and effort [Exaggerated Use Of Accessory Muscles For Inspiration] : no accessory muscle use [Normal Station and Gait] : the gait and station were normal for the patient's age [No Focal Deficits] : no focal deficits [Oriented To Time, Place, And Person] : oriented to person, place, and time [Affect] : the affect was normal [Mood] : the mood was normal [de-identified] : normal peripheral circulation

## 2024-09-25 NOTE — PHYSICAL EXAM
[Normal Appearance] : normal appearance [Well Groomed] : well groomed [General Appearance - In No Acute Distress] : no acute distress [] : no respiratory distress [Respiration, Rhythm And Depth] : normal respiratory rhythm and effort [Exaggerated Use Of Accessory Muscles For Inspiration] : no accessory muscle use [Normal Station and Gait] : the gait and station were normal for the patient's age [No Focal Deficits] : no focal deficits [Oriented To Time, Place, And Person] : oriented to person, place, and time [Affect] : the affect was normal [Mood] : the mood was normal [de-identified] : normal peripheral circulation

## 2024-09-25 NOTE — LETTER BODY
[Dear  ___] : Dear  [unfilled], [Consult Letter:] : I had the pleasure of evaluating your patient, [unfilled]. [( Thank you for referring [unfilled] for consultation for _____ )] : Thank you for referring [unfilled] for consultation for [unfilled] [Please see my note below.] : Please see my note below. [Consult Closing:] : Thank you very much for allowing me to participate in the care of this patient.  If you have any questions, please do not hesitate to contact me. [Sincerely,] : Sincerely, [FreeTextEntry3] : Speedy Amin MD  of Urology [facility]  [facility] Offices: [address], [address] [place] [address], [facility] [address] [facility], [place] [address]  TEL: [phone] FAX: [phone]

## 2024-09-25 NOTE — LETTER BODY
[Dear  ___] : Dear  [unfilled], [Consult Letter:] : I had the pleasure of evaluating your patient, [unfilled]. [( Thank you for referring [unfilled] for consultation for _____ )] : Thank you for referring [unfilled] for consultation for [unfilled] [Please see my note below.] : Please see my note below. [Consult Closing:] : Thank you very much for allowing me to participate in the care of this patient.  If you have any questions, please do not hesitate to contact me. [Sincerely,] : Sincerely, [FreeTextEntry3] : Speedy Amin MD  of Urology [facility]  [facility] Offices: [address] [facility], [address] [place] [address], [facility] [address] [facility], [place] [address]  TEL: [phone] FAX: [phone]

## 2024-09-25 NOTE — HISTORY OF PRESENT ILLNESS
[FreeTextEntry1] : Primary care physician: Dr. Chelsea Obrien.  84-year-old woman seen 09/25/2024 with complaint of Microscopic Hematuria. Patient was recently seen at urgent care center for right sided flank pain and lower abdominal pain. Patient states UA at urgent care showed microscopic blood (lab result not available to review) She was sent to [facility] ED where CT (08/19/24) was negative for stones or hydronephrosis.  08/19/24: UC negative and UA negative for microscopic blood.  She completed a course of antibiotics for possible UTI.  She denies gross hematuria, recurrent urine tract infection or history of kidney stones. Non-smoker. Was a . Not bothered by urination. Daytime frequency is 3-4 times and nocturia 2-3 times. Has urinary incontinence with cough and sneeze.

## 2024-09-25 NOTE — HISTORY OF PRESENT ILLNESS
[FreeTextEntry1] : Primary care physician: Dr. Chelsea Obrien.  84-year-old woman seen 09/25/2024 with complaint of Microscopic Hematuria. Patient was recently seen at urgent care center for right sided flank pain and lower abdominal pain. Patient states UA at urgent care showed microscopic blood (lab result not available to review) She was sent to [facility] ED where CT (08/19/24) was negative for stones or hydronephrosis.  08/19/24: UC negative and UA negative for microscopic blood.  She completed a course of antibiotics for possible UTI.  She denies gross hematuria, recurrent urine tract infection or history of kidney stones. Non-smoker. Was a . Not bothered by urination. Daytime frequency is 3-4 times and nocturia 2-3 times. Has urinary incontinence with cough and sneeze.

## 2024-09-25 NOTE — ASSESSMENT
[FreeTextEntry1] : Renal cyst: Discussed that Renal cysts are a common finding on routine radiological studies. Autopsy studies in patients over the age of 50 reveal greater than a 50% chance of having at least one simple renal cyst. And that renal cysts may be classified as simple or complex depending how they look on imaging. Discussed complexity of renal cysts and its implications as regards malignancy.  Simple appearing right renal cyst.   Microscopic Hematuria: Discussed the differential diagnosis of hematuria including benign and malignant pathology- including but not limited to nephrolithiasis, bladder stone, urinary tract infection, glomerular disease, renal cancer, bladder cancer.  Also discussed the chance that workup will not reveal a source for the bleeding. The patient understands that the hematuria could be from an upper tract (kidney or ureter) or lower tract (bladder, urethra) and that workup includes imaging and direct visualization of all of these. Discussed workup so far. Discussed possibility of dipstick hematuria at urgent care center. Discussed the procedure: Cystoscopy, risks and benefits.  Patient would like to proceed. Will get Urinalysis, Urine culture and Urine cytology.  Right flank Pain: Discussed no obvious pathology on CT urogram. On review of images a lot of stool and gas in colon. Asked to take MiraLAX/Milk of Magnesia/Colace until soft regular bowel movements. Then take Metamucil/Psyllium daily to avoid constipation. Consider seeing Gastroenterologist if condition persists.   Patient will return to office for Cystoscopy.

## 2024-09-27 LAB
APPEARANCE: CLEAR
BACTERIA UR CULT: NORMAL
BACTERIA: NEGATIVE /HPF
BILIRUBIN URINE: NEGATIVE
BLOOD URINE: NEGATIVE
CAST: 1 /LPF
COLOR: YELLOW
EPITHELIAL CELLS: 2 /HPF
GLUCOSE QUALITATIVE U: NEGATIVE MG/DL
KETONES URINE: NEGATIVE MG/DL
LEUKOCYTE ESTERASE URINE: NEGATIVE
MICROSCOPIC-UA: NORMAL
NITRITE URINE: NEGATIVE
PH URINE: 7
PROTEIN URINE: NEGATIVE MG/DL
RED BLOOD CELLS URINE: 1 /HPF
SPECIFIC GRAVITY URINE: 1.01
UROBILINOGEN URINE: 1 MG/DL
WHITE BLOOD CELLS URINE: 0 /HPF

## 2024-10-01 LAB — URINE CYTOLOGY: NORMAL

## 2024-10-07 LAB
APPEARANCE: CLEAR
BACTERIA UR CULT: NORMAL
BACTERIA: NEGATIVE /HPF
BILIRUBIN URINE: NEGATIVE
BLOOD URINE: NEGATIVE
CAST: 0 /LPF
COLOR: YELLOW
EPITHELIAL CELLS: 2 /HPF
GLUCOSE QUALITATIVE U: NEGATIVE MG/DL
KETONES URINE: NEGATIVE MG/DL
LEUKOCYTE ESTERASE URINE: NEGATIVE
MICROSCOPIC-UA: NORMAL
NITRITE URINE: NEGATIVE
PH URINE: 7
PROTEIN URINE: NEGATIVE MG/DL
RED BLOOD CELLS URINE: 0 /HPF
SPECIFIC GRAVITY URINE: 1.01
UROBILINOGEN URINE: 1 MG/DL
WHITE BLOOD CELLS URINE: 0 /HPF

## 2024-10-22 ENCOUNTER — APPOINTMENT (OUTPATIENT)
Dept: GYNECOLOGIC ONCOLOGY | Facility: CLINIC | Age: 84
End: 2024-10-22

## 2025-01-25 NOTE — ED STATDOCS - NS ED ATTENDING STATEMENT MOD
Inocencia
Patent
I have personally seen and examined this patient.  I have fully participated in the care of this patient. I have reviewed all pertinent clinical information, including history, physical exam, plan and the Resident’s note and agree except as noted.
